# Patient Record
Sex: FEMALE | Race: WHITE | NOT HISPANIC OR LATINO | Employment: UNEMPLOYED | URBAN - METROPOLITAN AREA
[De-identification: names, ages, dates, MRNs, and addresses within clinical notes are randomized per-mention and may not be internally consistent; named-entity substitution may affect disease eponyms.]

---

## 2017-01-25 ENCOUNTER — APPOINTMENT (OUTPATIENT)
Dept: ENDOCRINOLOGY | Facility: MEDICAL CENTER | Age: 57
End: 2017-01-25
Payer: COMMERCIAL

## 2017-02-13 ENCOUNTER — TELEPHONE (OUTPATIENT)
Dept: MEDICAL GROUP | Facility: MEDICAL CENTER | Age: 57
End: 2017-02-13

## 2017-02-14 NOTE — TELEPHONE ENCOUNTER
"Postmenopausal bleeding of any type needs to be followed up on but \"pinkish spotting and tenderness after intercourse,\" could just be irritation of the vaginal vault from dryness or friction.  Will you ask her to schedule an appt with us or her gyn asap if this returns.    "

## 2017-02-14 NOTE — TELEPHONE ENCOUNTER
Patient called today concerned that Saturday 12 hours after having intercourse she had some tenderness and pinkish spotting. As of today she is having no pain or pink spotting.  Patient just checking in to see if this is normal and if she needs to be seen for this. I did let patient know that if she was not having any issues at this time then she may not need an appt.I did let her know that I would seek advise from you.   Please advise

## 2017-02-14 NOTE — TELEPHONE ENCOUNTER
Patient notified.  She does have an appt in March that she will discuss this with Dr Goodman at that time

## 2017-02-22 ENCOUNTER — SLEEP CENTER VISIT (OUTPATIENT)
Dept: SLEEP MEDICINE | Facility: MEDICAL CENTER | Age: 57
End: 2017-02-22
Payer: COMMERCIAL

## 2017-02-22 VITALS
RESPIRATION RATE: 16 BRPM | HEIGHT: 66 IN | SYSTOLIC BLOOD PRESSURE: 132 MMHG | DIASTOLIC BLOOD PRESSURE: 84 MMHG | HEART RATE: 54 BPM | TEMPERATURE: 97.5 F | BODY MASS INDEX: 31.18 KG/M2 | OXYGEN SATURATION: 95 % | WEIGHT: 194 LBS

## 2017-02-22 DIAGNOSIS — G47.30 SLEEP DISORDER BREATHING: ICD-10-CM

## 2017-02-22 DIAGNOSIS — J30.89 SEASONAL ALLERGIC RHINITIS DUE TO OTHER ALLERGIC TRIGGER: ICD-10-CM

## 2017-02-22 PROCEDURE — 99203 OFFICE O/P NEW LOW 30 MIN: CPT | Performed by: NURSE PRACTITIONER

## 2017-02-22 RX ORDER — ZOLPIDEM TARTRATE 5 MG/1
5 TABLET ORAL NIGHTLY PRN
Qty: 3 TAB | Refills: 0 | Status: SHIPPED | OUTPATIENT
Start: 2017-02-22 | End: 2019-01-23

## 2017-02-22 NOTE — MR AVS SNAPSHOT
"        Mable GarciaJuma   2017 3:20 PM   Sleep Center Visit   MRN: [id number]    Department:  [facility]   Dept Phone:  [phone]    Description:  Female : 1960   Provider:  MICHELLE Thurston           Reason for Visit     Establish Care does not sleep well, Dr. Goodman did not tell patient why she was referring to us.      Allergies as of 2017     Allergen Noted Reactions    Codeine 2010   Nausea    Pcn [Penicillins] 2010   Hives      You were diagnosed with     Sleep disorder breathing   [444530]       BMI 31.0-31.9,adult   [160689]       Seasonal allergic rhinitis due to other allergic trigger   [4301713]         Vital Signs     Blood Pressure Pulse Temperature Respirations Height Weight    132/84 mmHg 54 36.4 °C (97.5 °F) 16 1.676 m (5' 6\") 87.998 kg (194 lb)    Body Mass Index Oxygen Saturation Smoking Status             31.33 kg/m2 95% Never Smoker          Basic Information     Date Of Birth Sex Race Ethnicity Preferred Language    1960 Female White Non- English      Your appointments     Mar 15, 2017  9:20 AM   New Patient with Yemi Velez M.D.   [facility]    [id number] [place] [address], [address]  [place] [address] [phone]           Please bring Photo ID, Insurance Cards, All Medication Bottles and copies of any legal documents (such as Living Will, Power of ) If speaking a language besides English please bring an adult . Please arrive 30 minutes prior for check in and registration. You will be receiving a confirmation call a few days before your appointment from our automated call confirmation system.            Mar 21, 2017  3:20 PM   Established Patient with Mable Goodman M.D.   [facility] (--)    [address] [place] [address] [phone]           You will be receiving a confirmation call a few days before your " appointment from our automated call confirmation system.            May 07, 2017  8:00 PM   Sleep Study Diagnostic with SLEEP TECH   [facility] (--)    [address] [place] [address]   [phone]            May 15, 2017  3:20 PM   Follow UP with MICHELLE Thurston   [facility] (--)    [address] [place] [address]   [phone]              Problem List              ICD-10-CM Priority Class Noted - Resolved    History of melanoma Z85.820   10/19/2011 - Present    Menopausal symptoms N95.1   10/19/2011 - Present    Hyperlipemia E78.5   10/19/2011 - Present    Hirsutism L68.0   10/19/2011 - Present    Abnormal thyroid function test R94.6   11/15/2011 - Present    Seasonal allergies J30.2   11/15/2011 - Present    Hyperprolactinemia (CMS-HCC) E22.1   5/7/2012 - Present    Pituitary lesion (CMS-HCC) E23.7   5/7/2012 - Present    Androgen excess, female post puberty E34.8   5/7/2012 - Present    Sleep disorder breathing G47.30   10/22/2012 - Present    Family history of diabetes mellitus Z83.3   10/22/2012 - Present    Elevated fasting blood sugar R73.01   10/22/2012 - Present    Health care maintenance Z00.00   5/24/2016 - Present    Vitamin D deficiency disease E55.9   12/5/2016 - Present    Bilateral tinnitus H93.13   12/5/2016 - Present    Hyperlipidemia E78.5   12/19/2016 - Present    Elevated liver enzymes R74.8   12/19/2016 - Present      Health Maintenance        Date Due Completion Dates    IMM DTaP/Tdap/Td Vaccine (1 - Tdap) 9/10/1978 ---    COLONOSCOPY 9/10/2009 ---    MAMMOGRAM 4/29/2014 4/29/2013 (Done)    Override on 4/29/2013: Done (normal )    PAP SMEAR 8/23/2019 8/23/2016, 10/22/2012, 10/21/2011            Current Immunizations     Influenza TIV (IM) 10/22/2012 11:39 AM    Influenza Vaccine Quad Inj (Pf) 12/5/2016      Below and/or attached are the medications your provider expects you to take. Review all  of your home medications and newly ordered medications with your provider and/or pharmacist. Follow medication instructions as directed by your provider and/or pharmacist. Please keep your medication list with you and share with your provider. Update the information when medications are discontinued, doses are changed, or new medications (including over-the-counter products) are added; and carry medication information at all times in the event of emergency situations     Allergies:  CODEINE - Nausea     PCN - Hives               Medications  Valid as of: February 22, 2017 -  3:45 PM    Generic Name Brand Name Tablet Size Instructions for use    Fluticasone Propionate (Suspension) FLONASE 50 MCG/ACT Spray 1 Spray in nose 2 times a day.        Zolpidem Tartrate (Tab) AMBIEN 5 MG Take 1 Tab by mouth at bedtime as needed for Sleep (1 to 3 po qhs prn insomnia/sleep study. Bring to sleep study.).        .                 Medicines prescribed today were sent to:     [facility] [address] [place] [address] (), [place] - [address] () [place] [address]    Phone: [phone] Fax: [phone]    Open 24 Hours?: No    [facility] - [address] [address]    Phone: [phone] Fax: [phone]    Open 24 Hours?: No      Medication refill instructions:       If your prescription bottle indicates you have medication refills left, it is not necessary to call your provider’s office. Please contact your pharmacy and they will refill your medication.    If your prescription bottle indicates you do not have any refills left, you may request refills at any time through one of the following ways: The online [employer] system (except Urgent Care), by calling your provider’s office, or by asking your pharmacy to contact your provider’s office with a refill request. Medication refills are processed only during regular business hours and may not  be available until the next business day. Your provider may request additional information or to have a follow-up visit with you prior to refilling your medication.   *Please Note: Medication refills are assigned a new Rx number when refilled electronically. Your pharmacy may indicate that no refills were authorized even though a new prescription for the same medication is available at the pharmacy. Please request the medicine by name with the pharmacy before contacting your provider for a refill.           [employer] Access Code: Activation code not generated  Current [employer] Status: Active

## 2017-02-22 NOTE — PROGRESS NOTES
"Chief Complaint   Patient presents with   • Establish Care     does not sleep well, Dr. Goodman did not tell patient why she was referring to us.       HPI:  Mable Jarrell is a 56 y.o. year old female here today to establish care. She was referred by her PCP to evaluate for sleep apnea. She does snore at night. She states she has snored for years. Her snoring has been worse the past 20 years with weight gain. She denies any witnessed apneas or waking gasping for air. Review of her sleep diary indicates that she keeps a set sleep/wake schedule going to bed around 9 am waking at 5:30 am. She tends to fall asleep within minutes. She does wake a couple times during the night to urinate. She does use an OTC sleep aid to help her fall asleep. She states without this she will wake at 1 am and has trouble falling back asleep. She states she has trouble \"shutting her mind down\". She states when she uses the sleep aid and logs enough hours of sleep she will wake refreshed. She is occasionally tired during the day. She notes rare morning headaches, but feels this is sinus related. She did have a home sleep study in the past, but is unsure of whether or not it indicated sleep apnea.   She does have a history of allergic rhinitis and is on a Flonase nasal spray.     Past Medical History   Diagnosis Date   • Pituitary abnormality (CMS-HCC)      2mm cyst or microadenoma   • Hyperprolactinemia (CMS-HCC) 2012     minimal = 32   • Hirsutism 2012     elevated testosterone   • Melanoma (CMS-HCC)    • Tonsillitis    • Chickenpox    • Mumps        Past Surgical History   Procedure Laterality Date   • Cholecystectomy     • Tonsillectomy         Family History   Problem Relation Age of Onset   • Diabetes Mother    • Diabetes Father    • Heart Disease Father    • Cancer Neg Hx    • Hypertension Neg Hx    • Hyperlipidemia Neg Hx    • Stroke Neg Hx    • Psychiatry Neg Hx    • Thyroid Neg Hx    • No Known Problems Sister    • No Known " "Problems Brother    • No Known Problems Daughter        Social History     Social History   • Marital Status:      Spouse Name: N/A   • Number of Children: N/A   • Years of Education: N/A     Occupational History   • Not on file.     Social History Main Topics   • Smoking status: Never Smoker    • Smokeless tobacco: Never Used   • Alcohol Use: 0.0 oz/week     0 Standard drinks or equivalent per week      Comment: 3-4 on the weekends   • Drug Use: No   • Sexual Activity:     Partners: Male     Other Topics Concern   • Not on file     Social History Narrative       ROS:  Constitutional: Denies fevers, chills, sweats, weight loss  Eyes: Denies glasses, vision loss, pain, drainage, double vision  Ears/Nose/Mouth/Throat: Denies ear ache, difficulty hearing, sore throat, persistent hoarseness, decayed teeth/toothache  Cardiovascular: Denies chest pain, tightness, palpitations, swelling in feet/legs, fainting, difficulty breathing when laying down  Respiratory: Denies shortness of breath, cough, sputum, wheezing, painful breathing, coughing up blood  GI: Denies heartburn, difficulty swallowing, nausea, vomiting, abdominal pain, diarrhea, constipation  : Denies frequent urination, painful urination  Integumentary: Denies rashes, lumps or color changes  MSK: Denies painful joints, sore muscles, and back pain.   Neurological: Denies frequent headaches, dizziness, weakness  Sleep: See HPI       Current Outpatient Prescriptions on File Prior to Visit   Medication Sig Dispense Refill   • fluticasone (FLONASE) 50 MCG/ACT nasal spray Spray 1 Spray in nose 2 times a day. 16 g 0     No current facility-administered medications on file prior to visit.     Codeine and Pcn    Blood pressure 132/84, pulse 54, temperature 36.4 °C (97.5 °F), resp. rate 16, height 1.676 m (5' 6\"), weight 87.998 kg (194 lb), SpO2 95 %.  PE:   Appearance: Well developed, well nourished, no acute distress  Eyes: PERRL, EOM intact, sclera white, " conjunctiva moist  Ears: no lesions or deformities  Hearing: grossly intact  Nose: no lesions or deformities  Oropharynx: tongue normal, posterior pharynx without erythema or exudate  Mallampati Classification: class 4   Neck: supple, trachea midline, no masses   Respiratory effort: no intercostal retractions or use of accessory muscles  Lung auscultation: no rales, rhonchi or wheezes  Heart auscultation: no murmur rub or gallop  Extremities: no cyanosis or edema  Abdomen: soft ,non tender, no masses  Gait and Station: normal  Digits and nails: no clubbing, cyanosis, petechiae or nodes.  Cranial nerves: grossly intact  Skin: no rashes, lesions or ulcers noted  Orientation: Oriented to time, person and place  Mood and affect: mood and affect appropriate, normal interaction with examiner  Judgement: Intact          Assessment:  1. Sleep disorder breathing     2. BMI 31.0-31.9,adult     3. Seasonal allergic rhinitis due to other allergic trigger           Plan:    1) Polysomnogram now to evaluate for sleep apnea. Discussed pathophysiology of sleep apnea in detail and various treatment options. RX for Ambien provided to have on hand the night of the study.  2) Sleep hygiene discussed. Weight loss recommended.  3) Continue Flonase nasal spray.   4) Follow up after Polysomnogram, sooner if needed.

## 2017-02-22 NOTE — PATIENT INSTRUCTIONS
Plan:    1) Polysomnogram now to evaluate for sleep apnea. Discussed pathophysiology of sleep apnea in detail and various treatment options. RX for Ambien provided to have on hand the night of the study.  2) Sleep hygiene discussed. Weight loss recommended.  3) Continue Flonase nasal spray.   4) Follow up after Polysomnogram, sooner if needed.

## 2017-03-15 ENCOUNTER — OFFICE VISIT (OUTPATIENT)
Dept: ENDOCRINOLOGY | Facility: MEDICAL CENTER | Age: 57
End: 2017-03-15
Payer: COMMERCIAL

## 2017-03-15 VITALS
SYSTOLIC BLOOD PRESSURE: 132 MMHG | DIASTOLIC BLOOD PRESSURE: 80 MMHG | WEIGHT: 194 LBS | HEIGHT: 64 IN | BODY MASS INDEX: 33.12 KG/M2 | HEART RATE: 61 BPM | OXYGEN SATURATION: 94 %

## 2017-03-15 DIAGNOSIS — E22.1 HYPERPROLACTINEMIA (HCC): ICD-10-CM

## 2017-03-15 DIAGNOSIS — E23.7 PITUITARY LESION (HCC): ICD-10-CM

## 2017-03-15 DIAGNOSIS — E28.1 ANDROGEN EXCESS, FEMALE POST PUBERTY: Primary | ICD-10-CM

## 2017-03-15 PROCEDURE — 99214 OFFICE O/P EST MOD 30 MIN: CPT | Performed by: INTERNAL MEDICINE

## 2017-03-15 NOTE — PROGRESS NOTES
"Chief Complaint   Patient presents with   • Pituitary Adenoma     hyperprolactinemia        HPI:    1.  Hyperprolactinemia          The patient has had a mild elevation of prolactin in the past of unknown significance. She does have a tiny pituitary lesion but unclear whether they're related. She went through menopause roughly around the age of 44. Her menses were normal up to that point. No infertility.         Her current prolactin is up a little bit to 42 and previously 4 years ago was 28.    2.  Pituitary adenoma ?             Pituitary MRI in 2012 showed a \"subtle\" 2 mm lesion in the right side of the pituitary considered not specific. She was not treated with cabergoline or any hormonal supplements in the interval.    3.  Elevated testosterone            We got into this because of scalp hair loss. She has had a mild elevation of her testosterone. She has not been hirsute otherwise. She did not have acne. She does not take any hormonal supplements topical or otherwise.             Her current total testosterone is up to 136 and previously it had been   (7 -40) in 2012    Nothing has changed in the interval. She is in to reexamine these particular abnormalities    ROS:  All other systems reported as negative or unchanged since last exam      Allergies:   Allergies   Allergen Reactions   • Codeine Nausea   • Pcn [Penicillins] Hives       Current medicines including changes today:  Current Outpatient Prescriptions   Medication Sig Dispense Refill   • zolpidem (AMBIEN) 5 MG Tab Take 1 Tab by mouth at bedtime as needed for Sleep (1 to 3 po qhs prn insomnia/sleep study. Bring to sleep study.). 3 Tab 0   • fluticasone (FLONASE) 50 MCG/ACT nasal spray Spray 1 Spray in nose 2 times a day. 16 g 0     No current facility-administered medications for this visit.        Past Medical History   Diagnosis Date   • Pituitary abnormality (CMS-HCC)      2mm cyst or microadenoma   • Hyperprolactinemia (CMS-HCC) 2012     " "minimal = 32   • Hirsutism 2012     elevated testosterone   • Melanoma (CMS-HCC)    • Tonsillitis    • Chickenpox    • Mumps        PHYSICAL EXAM:    /80 mmHg  Pulse 61  Ht 1.626 m (5' 4\")  Wt 87.998 kg (194 lb)  BMI 33.28 kg/m2  SpO2 94%    Gen.   appears healthy     Skin   appropriate for sex and age, patient is not hirsute. No acne    HEENT  visual fields are full to gross confrontation    Neck   thyroid gland is small normal    Heart  regular    Extremities  no edema    Neuro  gait and station normal    Psych  appropriate, calm, pleasant    ASSESSMENT AND RECOMMENDATIONS    1. Hyperprolactinemia (CMS-HCC)             Apparently progressive up to 42  - PROLACTIN; Future      2. Pituitary lesion (CMS-HCC)              2 mm lesion in 2012 of unknown significance  - PROLACTIN; Future  - MR-BRAIN PITUITARY W/WO; Future    3. 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 No obvious etiology             Would like to avoid the radiation associated with CT so we will screen with ultrasound of adrenals and ovaries   - DHEA; Future  - CORTISOL; Future  - ACTH; Future  - FSH; Future  - ESTRADIOL; Future  - BIOAVAILABLE+FREE TESTOSTERONE  - US-ABDOMEN LIMITED; Future  - US-OB PELVIS TRANSVAGINAL; Future      DISPOSITION: Return in about 6 months (around 9/15/2017).       Yemi Velez M.D.    Copies to: Mable Goodman M.D. [phone]  "

## 2017-03-15 NOTE — MR AVS SNAPSHOT
"        Mable Jarrell   3/15/2017 9:20 AM   Office Visit   MRN: [id number]    Department:  Endocrinology [facility]   Dept Phone:  [phone]    Description:  Female : 1960   Provider:  Yemi Velez M.D.           Allergies as of 3/15/2017     Allergen Noted Reactions    Codeine 2010   Nausea    Pcn [Penicillins] 2010   Hives      You were diagnosed with     Pituitary lesion (CMS-[facility])   [603810]  -  Primary     Hyperprolactinemia (CMS-[facility])   [910886]       Androgen excess, female post puberty   [209750]         Vital Signs     Blood Pressure Pulse Height Weight Body Mass Index Oxygen Saturation    132/80 mmHg 61 1.626 m (5' 4\") 87.998 kg (194 lb) 33.28 kg/m2 94%    Smoking Status                   Never Smoker            Basic Information     Date Of Birth Sex Race Ethnicity Preferred Language    1960 Female White Non- English      Your appointments     Mar 21, 2017  3:20 PM   Established Patient with Mable Goodman M.D.   [facility] (--)    [address]  [place] [address] [phone]           You will be receiving a confirmation call a few days before your appointment from our automated call confirmation system.            Mar 31, 2017  7:05 PM   Sleep Study Diagnostic with SLEEP TECH   [facility] (--)    [address]  [place] [address] [phone]            2017  3:00 PM   Follow UP with MICHELLE Thurston   [facility] (--)    [address]  [place] [address] [phone]            Sep 20, 2017  3:40 PM   Established Patient with Yemi Velez M.D.   [facility]    [address]  [place] [address] [phone]           You will be receiving a confirmation call a few days before your appointment from our automated call confirmation system.              "   Problem List              ICD-10-CM Priority Class Noted - Resolved    History of melanoma Z85.820   10/19/2011 - Present    Menopausal symptoms N95.1   10/19/2011 - Present    Hyperlipemia E78.5   10/19/2011 - Present    Hirsutism L68.0   10/19/2011 - Present    Abnormal thyroid function test R94.6   11/15/2011 - Present    Seasonal allergies J30.2   11/15/2011 - Present    Hyperprolactinemia (CMS-HCC) E22.1   5/7/2012 - Present    Pituitary lesion (CMS-HCC) E23.7   5/7/2012 - Present    Androgen excess, female post puberty E34.8   5/7/2012 - Present    Sleep disorder breathing G47.30   10/22/2012 - Present    Family history of diabetes mellitus Z83.3   10/22/2012 - Present    Elevated fasting blood sugar R73.01   10/22/2012 - Present    Health care maintenance Z00.00   5/24/2016 - Present    Vitamin D deficiency disease E55.9   12/5/2016 - Present    Bilateral tinnitus H93.13   12/5/2016 - Present    Hyperlipidemia E78.5   12/19/2016 - Present    Elevated liver enzymes R74.8   12/19/2016 - Present      Health Maintenance        Date Due Completion Dates    IMM DTaP/Tdap/Td Vaccine (1 - Tdap) 9/10/1978 ---    COLONOSCOPY 9/10/2009 ---    MAMMOGRAM 4/29/2014 4/29/2013 (Done)    Override on 4/29/2013: Done (normal )    PAP SMEAR 8/23/2019 8/23/2016, 10/22/2012, 10/21/2011            Current Immunizations     Influenza TIV (IM) 10/22/2012 11:39 AM    Influenza Vaccine Quad Inj (Pf) 12/5/2016      Below and/or attached are the medications your provider expects you to take. Review all of your home medications and newly ordered medications with your provider and/or pharmacist. Follow medication instructions as directed by your provider and/or pharmacist. Please keep your medication list with you and share with your provider. Update the information when medications are discontinued, doses are changed, or new medications (including over-the-counter products) are added; and carry medication information at all times in the  event of emergency situations     Allergies:  CODEINE - Nausea     PCN - Hives               Medications  Valid as of: March 15, 2017 -  9:47 AM    Generic Name Brand Name Tablet Size Instructions for use    Fluticasone Propionate (Suspension) FLONASE 50 MCG/ACT Spray 1 Spray in nose 2 times a day.        Zolpidem Tartrate (Tab) AMBIEN 5 MG Take 1 Tab by mouth at bedtime as needed for Sleep (1 to 3 po qhs prn insomnia/sleep study. Bring to sleep study.).        .                 Medicines prescribed today were sent to:     [facility] [place] (), [place] - [address]    [address] () [place] [address]    Phone: [phone] Fax: [phone]    Open 24 Hours?: No    [facility] - [address] [address]    Phone: [phone] Fax: [phone]    Open 24 Hours?: No      Medication refill instructions:       If your prescription bottle indicates you have medication refills left, it is not necessary to call your provider’s office. Please contact your pharmacy and they will refill your medication.    If your prescription bottle indicates you do not have any refills left, you may request refills at any time through one of the following ways: The online [employer] system (except Urgent Care), by calling your provider’s office, or by asking your pharmacy to contact your provider’s office with a refill request. Medication refills are processed only during regular business hours and may not be available until the next business day. Your provider may request additional information or to have a follow-up visit with you prior to refilling your medication.   *Please Note: Medication refills are assigned a new Rx number when refilled electronically. Your pharmacy may indicate that no refills were authorized even though a new prescription for the same medication is available at the pharmacy. Please request the medicine by name with  the pharmacy before contacting your provider for a refill.        Your To Do List     Future Labs/Procedures Complete By Expires    ACTH  As directed 9/15/2017    CORTISOL  As directed 9/15/2017    DHEA  As directed 9/15/2017    ESTRADIOL  As directed 9/15/2017    FSH  As directed 9/15/2017    PROLACTIN  As directed 9/15/2017         MyChart Access Code: Activation code not generated  Current [employer] Status: Active

## 2017-03-31 ENCOUNTER — SLEEP STUDY (OUTPATIENT)
Dept: SLEEP MEDICINE | Facility: MEDICAL CENTER | Age: 57
End: 2017-03-31
Attending: NURSE PRACTITIONER
Payer: COMMERCIAL

## 2017-03-31 DIAGNOSIS — E28.1 ANDROGEN EXCESS, FEMALE POST PUBERTY: ICD-10-CM

## 2017-03-31 PROCEDURE — 95811 POLYSOM 6/>YRS CPAP 4/> PARM: CPT | Performed by: INTERNAL MEDICINE

## 2017-04-03 ENCOUNTER — HOSPITAL ENCOUNTER (OUTPATIENT)
Dept: RADIOLOGY | Facility: MEDICAL CENTER | Age: 57
End: 2017-04-03
Attending: INTERNAL MEDICINE
Payer: COMMERCIAL

## 2017-04-03 DIAGNOSIS — E28.1 ANDROGEN EXCESS, FEMALE POST PUBERTY: ICD-10-CM

## 2017-04-03 PROCEDURE — 76830 TRANSVAGINAL US NON-OB: CPT

## 2017-04-03 PROCEDURE — 76775 US EXAM ABDO BACK WALL LIM: CPT

## 2017-04-03 NOTE — PROCEDURES
CLINICAL COMMENTS:  The patient underwent a split night polysomnogram with a CPAP titration using the standard montage for measurement of parameters of sleep, respiratory events, movement abnormalities, heart rate and rhythm. A microphone was used to monitor snoring.    INTERPRETATION: The diagnostic recording time was 137.8 minutes with a sleep period of 132.3 minutes.  Total sleep time was 129.3 minutes with a sleep efficiency of 93.9%.  The sleep latency was 5.5 minutes, and REM latency was N/A minutes.  The patient had 12 arousals in total, for an arousal index of 5.6.        RESPIRATORY: The patient had 1 apneas in total.  Of these, 0 were obstructive apneas, and 1 were central apneas.  This resulted in an apnea index (AI) of 0.5.  The patient had 57 hypopneas in total, which resulted in a hypopnea index of 26.5.  The overall AHI was 26.9, while the AHI during REM was N/A.  The supine AHI = 26.9.    OXIMETRY: Oxygen saturation monitoring showed a mean SpO2 of 88.9% for the diagnostic part of the study, with a minimum oxygen saturation of 75.0%.  Oxygen saturations were below 89% for 51.2% of sleep time.    CARDIAC: The highest heart rate for the first part of the study was 88.0 beats per minute.  The average heart rate during sleep was 60.6 bpm, while the highest heart rate was 84.0 bpm.    LIMB MOVEMENTS: There were a total of 0 periodic limb movements during sleep, of which 0 were PLMS arousals.  This resulted in a PLMS index of 0.0 and a PLMS arousal index of 0.0.    TREATMENT    Treatment recording time was 235.8 minutes with a total sleep time of 176.5min.  The patient had an arousal index of 7.5.      RESPIRATORY: The patient had 0 obstructive apneas, 23 central apneas, and 62 hypopneas for an overall AHI was 28.9.    OXIMETRY: The mean SpO2 during treatment was 89.3%, with a minimum oxygen saturation of 69.0%.      CPAP was tried from 4cm to [id number].    Impression:    Severe obstructive sleep apnea  hypopnea was found. The apnea hypopnea index was 26.9, the mean event duration 18 seconds, and the longest event was 30.9 seconds. The patient did not achieve any REM and slept entirely in the supine position during the diagnostic phase. The lowest saturation during sleep was 75.0%. Saturations were less than 90% for 81% of the recording.    Once the patient met the split-night protocol, the technician performed a positive airway pressure titration. CPAP was started at 4 cm and increased to a maximum pressure of CPAP at 10 cm in an attempt to normalize events and desaturations. The patient did best on CPAP at 8 cm where she was able to achieve supine REM sleep, had a resultant AHI of 9.2, a minimum saturation of 86%, and a mean saturation of 88.4%. She did not do nearly as well on higher and lower CPAP pressures.  Central apneas increased to 27.1% of the total number of respiratory events up from 1.7% during the diagnostic study. This is not enough to qualify her for ASV.    Recommendation:    Consider an empiric trial of auto titrating CPAP 8-16 cm. Alternatively, the patient can return for a full night dedicated bilevel titration.

## 2017-04-04 ENCOUNTER — TELEPHONE (OUTPATIENT)
Dept: ENDOCRINOLOGY | Facility: MEDICAL CENTER | Age: 57
End: 2017-04-04

## 2017-04-04 NOTE — TELEPHONE ENCOUNTER
Left message to call me back.         Result Note      No adrenal tumor seen on this study. Still a mystery. We should continue to follow.          Yemi Velez M.D.

## 2017-04-04 NOTE — TELEPHONE ENCOUNTER
----- Message from Yemi Velez M.D. sent at 4/3/2017 10:38 PM PDT -----  Ovaries look good also.    Yemi Velez M.D.

## 2017-04-25 ENCOUNTER — SLEEP CENTER VISIT (OUTPATIENT)
Dept: SLEEP MEDICINE | Facility: MEDICAL CENTER | Age: 57
End: 2017-04-25
Payer: COMMERCIAL

## 2017-04-25 VITALS
HEART RATE: 66 BPM | RESPIRATION RATE: 16 BRPM | DIASTOLIC BLOOD PRESSURE: 86 MMHG | HEIGHT: 66 IN | TEMPERATURE: 98.8 F | SYSTOLIC BLOOD PRESSURE: 132 MMHG | BODY MASS INDEX: 31.18 KG/M2 | WEIGHT: 194 LBS | OXYGEN SATURATION: 94 %

## 2017-04-25 DIAGNOSIS — G47.33 OSA (OBSTRUCTIVE SLEEP APNEA): ICD-10-CM

## 2017-04-25 DIAGNOSIS — J30.89 SEASONAL ALLERGIC RHINITIS DUE TO OTHER ALLERGIC TRIGGER: ICD-10-CM

## 2017-04-25 PROCEDURE — 99213 OFFICE O/P EST LOW 20 MIN: CPT | Performed by: NURSE PRACTITIONER

## 2017-04-25 NOTE — PATIENT INSTRUCTIONS
Sleep Apnea   Sleep apnea is a sleep disorder characterized by abnormal pauses in breathing while you sleep. When your breathing pauses, the level of oxygen in your blood decreases. This causes you to move out of deep sleep and into light sleep. As a result, your quality of sleep is poor, and the system that carries your blood throughout your body (cardiovascular system) experiences stress. If sleep apnea remains untreated, the following conditions can develop:  · High blood pressure (hypertension).  · Coronary artery disease.  · Inability to achieve or maintain an erection (impotence).  · Impairment of your thought process (cognitive dysfunction).  There are three types of sleep apnea:  1. Obstructive sleep apnea--Pauses in breathing during sleep because of a blocked airway.  2. Central sleep apnea--Pauses in breathing during sleep because the area of the brain that controls your breathing does not send the correct signals to the muscles that control breathing.  3. Mixed sleep apnea--A combination of both obstructive and central sleep apnea.  RISK FACTORS  The following risk factors can increase your risk of developing sleep apnea:  · Being overweight.  · Smoking.  · Having narrow passages in your nose and throat.  · Being of older age.  · Being male.  · Alcohol use.  · Sedative and tranquilizer use.  · Ethnicity. Among individuals younger than 35 years,  Americans are at increased risk of sleep apnea.  SYMPTOMS   · Difficulty staying asleep.  · Daytime sleepiness and fatigue.  · Loss of energy.  · Irritability.  · Loud, heavy snoring.  · Morning headaches.  · Trouble concentrating.  · Forgetfulness.  · Decreased interest in sex.  · Unexplained sleepiness.  DIAGNOSIS   In order to diagnose sleep apnea, your caregiver will perform a physical examination. A sleep study done in the comfort of your own home may be appropriate if you are otherwise healthy. Your caregiver may also recommend that you spend the  "night in a sleep lab. In the sleep lab, several monitors record information about your heart, lungs, and brain while you sleep. Your leg and arm movements and blood oxygen level are also recorded.  TREATMENT  The following actions may help to resolve mild sleep apnea:  · Sleeping on your side.    · Using a decongestant if you have nasal congestion.    · Avoiding the use of depressants, including alcohol, sedatives, and narcotics.    · Losing weight and modifying your diet if you are overweight.  There also are devices and treatments to help open your airway:  · Oral appliances. These are custom-made mouthpieces that shift your lower jaw forward and slightly open your bite. This opens your airway.  · Devices that create positive airway pressure. This positive pressure \"splints\" your airway open to help you breathe better during sleep. The following devices create positive airway pressure:  ¨ Continuous positive airway pressure (CPAP) device. The CPAP device creates a continuous level of air pressure with an air pump. The air is delivered to your airway through a mask while you sleep. This continuous pressure keeps your airway open.  ¨ Nasal expiratory positive airway pressure (EPAP) device. The EPAP device creates positive air pressure as you exhale. The device consists of single-use valves, which are inserted into each nostril and held in place by adhesive. The valves create very little resistance when you inhale but create much more resistance when you exhale. That increased resistance creates the positive airway pressure. This positive pressure while you exhale keeps your airway open, making it easier to breath when you inhale again.  ¨ Bilevel positive airway pressure (BPAP) device. The BPAP device is used mainly in patients with central sleep apnea. This device is similar to the CPAP device because it also uses an air pump to deliver continuous air pressure through a mask. However, with the BPAP machine, the " pressure is set at two different levels. The pressure when you exhale is lower than the pressure when you inhale.  · Surgery. Typically, surgery is only done if you cannot comply with less invasive treatments or if the less invasive treatments do not improve your condition. Surgery involves removing excess tissue in your airway to create a wider passage way.     This information is not intended to replace advice given to you by your health care provider. Make sure you discuss any questions you have with your health care provider.     Document Released: 12/08/2003 Document Revised: 01/08/2016 Document Reviewed: 04/25/2013  [employer] Patient Education ©2016 [employer]

## 2017-04-25 NOTE — PROGRESS NOTES
"Chief Complaint   Patient presents with   • Apnea     Sleep Study results       HPI:  Mable Jarrell is a 56 y.o. year old female here today for follow-up on her Polysomnogram.  She was initially referred by her PCP to evaluate for sleep apnea. She does snore at night. She states she has snored for years. Her snoring has been worse the past 20 years with weight gain. She denies any witnessed apneas or waking gasping for air. Review of her sleep diary indicates that she keeps a set sleep/wake schedule going to bed around 9 am waking at 5:30 am. She tends to fall asleep within minutes. She does wake a couple times during the night to urinate. She does use an OTC sleep aid to help her fall asleep. She states without this she will wake at 1 am and has trouble falling back asleep. She states she has trouble \"shutting her mind down\". She states when she uses the sleep aid and logs enough hours of sleep she will wake refreshed. She is occasionally tired during the day. She notes rare morning headaches, but feels this is sinus related. She did have a home sleep study in the past, but is unsure of whether or not it indicated sleep apnea.    She does have a history of allergic rhinitis and is on a Flonase nasal spray.   Polysomnogram performed on 3/31/2017 indicates evidence of moderately severe obstructive sleep apnea with an AHI of 26.9 with a low 02 saturation of 75% and 80.2% of the night spent with saturations less than 90%. She had a split night titration to CPAP pressures of 4-10 CM. She had a positive but incomplete response to CPAP. On a pressure of 8 CM her AHI was reduced to 9.2. However, her mean 02 saturation was only 88.4%.     Past Medical History   Diagnosis Date   • Pituitary abnormality (CMS-HCC)      2mm cyst or microadenoma   • Hyperprolactinemia (CMS-[facility]) 2012     minimal = 32   • Hirsutism 2012     elevated testosterone   • Melanoma (CMS-[facility])    • Tonsillitis    • Chickenpox    • Mumps        Past " Surgical History   Procedure Laterality Date   • Cholecystectomy     • Tonsillectomy         Family History   Problem Relation Age of Onset   • Diabetes Mother    • Diabetes Father    • Heart Disease Father    • Cancer Neg Hx    • Hypertension Neg Hx    • Hyperlipidemia Neg Hx    • Stroke Neg Hx    • Psychiatry Neg Hx    • Thyroid Neg Hx    • No Known Problems Sister    • No Known Problems Brother    • No Known Problems Daughter        Social History     Social History   • Marital Status:      Spouse Name: N/A   • Number of Children: N/A   • Years of Education: N/A     Occupational History   • Not on file.     Social History Main Topics   • Smoking status: Never Smoker    • Smokeless tobacco: Never Used   • Alcohol Use: 0.0 oz/week     0 Standard drinks or equivalent per week      Comment: 3-4 on the weekends   • Drug Use: No   • Sexual Activity:     Partners: Male     Other Topics Concern   • Not on file     Social History Narrative         ROS:  Constitutional: Denies fevers, chills, sweats, weight loss  Eyes: Denies glasses, vision loss, pain, drainage, double vision  Ears/Nose/Mouth/Throat: Denies ear ache, difficulty hearing, sore throat, persistent hoarseness, decayed teeth/toothache  Cardiovascular: Denies chest pain, tightness, palpitations, swelling in feet/legs, fainting, difficulty breathing when laying down  Respiratory: Denies shortness of breath, cough, sputum, wheezing, painful breathing, coughing up blood  GI: Denies heartburn, difficulty swallowing, nausea, vomiting, abdominal pain, diarrhea, constipation  : Denies frequent urination, painful urination  Integumentary: Denies rashes, lumps or color changes  MSK: Denies painful joints, sore muscles, and back pain.   Neurological: Denies frequent headaches, dizziness, weakness  Sleep: See HPI       Current Outpatient Prescriptions on File Prior to Visit   Medication Sig Dispense Refill   • zolpidem (AMBIEN) 5 MG Tab Take 1 Tab by mouth at  "bedtime as needed for Sleep (1 to 3 po qhs prn insomnia/sleep study. Bring to sleep study.). 3 Tab 0   • fluticasone (FLONASE) 50 MCG/ACT nasal spray Spray 1 Spray in nose 2 times a day. 16 g 0     No current facility-administered medications on file prior to visit.     Codeine and Pcn    Blood pressure 132/86, pulse 66, temperature 37.1 °C (98.8 °F), resp. rate 16, height 1.676 m (5' 6\"), weight 87.998 kg (194 lb), SpO2 94 %.  PE:   Appearance: Well developed, well nourished, no acute distress  Eyes: PERRL, EOM intact, sclera white, conjunctiva moist  Ears: no lesions or deformities  Hearing: grossly intact  Nose: no lesions or deformities  Oropharynx: tongue normal, posterior pharynx without erythema or exudate  Mallampati Classification: class 4  Neck: supple, trachea midline, no masses   Respiratory effort: no intercostal retractions or use of accessory muscles  Lung auscultation: no rales, rhonchi or wheezes  Heart auscultation: no murmur rub or gallop  Extremities: no cyanosis or edema  Abdomen: soft ,non tender, no masses  Gait and Station: normal  Digits and nails: no clubbing, cyanosis, petechiae or nodes.  Cranial nerves: grossly intact  Skin: no rashes, lesions or ulcers noted  Orientation: Oriented to time, person and place  Mood and affect: mood and affect appropriate, normal interaction with examiner  Judgement: Intact          Assessment:  1. MANISH (obstructive sleep apnea)     2. Body mass index (bmi) 31.0-31.9, adult     3. Seasonal allergic rhinitis due to other allergic trigger           Plan:      1) Reviewed sleep study in detail. Discussed pathophysiology of sleep apnea and various treatment options in detail. She is amendable to a trial of airway pressurization. Order for Auto CPAP at 10-16 CM H20. CNOX in 1 month on this setting to ensure adequate 02 saturations. Handout provided on sleep apnea.  2) Sleep hygiene discussed.   3) Weight loss recommended.   4) Follow up in 6 weeks with compliance " card download, sooner if needed.

## 2017-04-25 NOTE — MR AVS SNAPSHOT
"        Mable Jarrell   2017 3:00 PM   Sleep Center Visit   MRN: [id number]    Department:  [facility]   Dept Phone:  [phone]    Description:  Female : 1960   Provider:  MICHELLE Thurston           Reason for Visit     Apnea Sleep Study results      Allergies as of 2017     Allergen Noted Reactions    Codeine 2010   Nausea    Pcn [Penicillins] 2010   Hives      You were diagnosed with     MANISH (obstructive sleep apnea)   [689081]       Body mass index (bmi) 31.0-31.9, adult   [3222307]       Seasonal allergic rhinitis due to other allergic trigger   [9958036]         Vital Signs     Blood Pressure Pulse Temperature Respirations Height Weight    132/86 mmHg 66 37.1 °C (98.8 °F) 16 1.676 m (5' 6\") 87.998 kg (194 lb)    Body Mass Index Oxygen Saturation Smoking Status             31.33 kg/m2 94% Never Smoker          Basic Information     Date Of Birth Sex Race Ethnicity Preferred Language    1960 Female White Non- English      Your appointments     May 16, 2017  4:00 PM   Established Patient with Mable Goodman M.D.   [facility] (--)    [address]  [place] [address] [phone]           You will be receiving a confirmation call a few days before your appointment from our automated call confirmation system.            2017  3:00 PM   Follow UP with LARUEN ThurstonPRAO   [facility] (--)    [address]  [place] [address] [phone]            Sep 20, 2017  3:40 PM   Established Patient with Yemi Velez M.D.   [facility]    [address]  [place] [address] [phone]           You will be receiving a confirmation call a few days before your appointment from our automated call confirmation system.              Problem List              ICD-10-CM Priority Class Noted - " Resolved    History of melanoma Z85.820   10/19/2011 - Present    Menopausal symptoms N95.1   10/19/2011 - Present    Hyperlipemia E78.5   10/19/2011 - Present    Hirsutism L68.0   10/19/2011 - Present    Abnormal thyroid function test R94.6   11/15/2011 - Present    Seasonal allergies J30.2   11/15/2011 - Present    Hyperprolactinemia (CMS-HCC) E22.1   5/7/2012 - Present    Pituitary lesion (CMS-HCC) E23.7   5/7/2012 - Present    Androgen excess, female post puberty E34.8   5/7/2012 - Present    MANISH (obstructive sleep apnea) G47.33   10/22/2012 - Present    Family history of diabetes mellitus Z83.3   10/22/2012 - Present    Elevated fasting blood sugar R73.01   10/22/2012 - Present    Health care maintenance Z00.00   5/24/2016 - Present    Vitamin D deficiency disease E55.9   12/5/2016 - Present    Bilateral tinnitus H93.13   12/5/2016 - Present    Hyperlipidemia E78.5   12/19/2016 - Present    Elevated liver enzymes R74.8   12/19/2016 - Present      Health Maintenance        Date Due Completion Dates    IMM DTaP/Tdap/Td Vaccine (1 - Tdap) 9/10/1978 ---    COLONOSCOPY 9/10/2009 ---    MAMMOGRAM 4/29/2014 4/29/2013 (Done)    Override on 4/29/2013: Done (normal )    PAP SMEAR 8/23/2019 8/23/2016, 10/22/2012, 10/21/2011            Current Immunizations     Influenza TIV (IM) 10/22/2012 11:39 AM    Influenza Vaccine Quad Inj (Pf) 12/5/2016      Below and/or attached are the medications your provider expects you to take. Review all of your home medications and newly ordered medications with your provider and/or pharmacist. Follow medication instructions as directed by your provider and/or pharmacist. Please keep your medication list with you and share with your provider. Update the information when medications are discontinued, doses are changed, or new medications (including over-the-counter products) are added; and carry medication information at all times in the event of emergency situations     Allergies:  CODEINE -  Nausea     PCN - Hives               Medications  Valid as of: April 25, 2017 -  3:52 PM    Generic Name Brand Name Tablet Size Instructions for use    Fluticasone Propionate (Suspension) FLONASE 50 MCG/ACT Spray 1 Spray in nose 2 times a day.        Zolpidem Tartrate (Tab) AMBIEN 5 MG Take 1 Tab by mouth at bedtime as needed for Sleep (1 to 3 po qhs prn insomnia/sleep study. Bring to sleep study.).        .                 Medicines prescribed today were sent to:     [address] (), [place] - [address]    [address] () [place] [address]    Phone: [phone] Fax: [phone]    Open 24 Hours?: No    [facility] - [address] [address]    Phone: [phone] Fax: [phone]    Open 24 Hours?: No      Medication refill instructions:       If your prescription bottle indicates you have medication refills left, it is not necessary to call your provider’s office. Please contact your pharmacy and they will refill your medication.    If your prescription bottle indicates you do not have any refills left, you may request refills at any time through one of the following ways: The online [employer] system (except Urgent Care), by calling your provider’s office, or by asking your pharmacy to contact your provider’s office with a refill request. Medication refills are processed only during regular business hours and may not be available until the next business day. Your provider may request additional information or to have a follow-up visit with you prior to refilling your medication.   *Please Note: Medication refills are assigned a new Rx number when refilled electronically. Your pharmacy may indicate that no refills were authorized even though a new prescription for the same medication is available at the pharmacy. Please request the medicine by name with the pharmacy before contacting your provider for a  refill.        Instructions    Sleep Apnea   Sleep apnea is a sleep disorder characterized by abnormal pauses in breathing while you sleep. When your breathing pauses, the level of oxygen in your blood decreases. This causes you to move out of deep sleep and into light sleep. As a result, your quality of sleep is poor, and the system that carries your blood throughout your body (cardiovascular system) experiences stress. If sleep apnea remains untreated, the following conditions can develop:  · High blood pressure (hypertension).  · Coronary artery disease.  · Inability to achieve or maintain an erection (impotence).  · Impairment of your thought process (cognitive dysfunction).  There are three types of sleep apnea:  1. Obstructive sleep apnea--Pauses in breathing during sleep because of a blocked airway.  2. Central sleep apnea--Pauses in breathing during sleep because the area of the brain that controls your breathing does not send the correct signals to the muscles that control breathing.  3. Mixed sleep apnea--A combination of both obstructive and central sleep apnea.  RISK FACTORS  The following risk factors can increase your risk of developing sleep apnea:  · Being overweight.  · Smoking.  · Having narrow passages in your nose and throat.  · Being of older age.  · Being male.  · Alcohol use.  · Sedative and tranquilizer use.  · Ethnicity. Among individuals younger than 35 years,  Americans are at increased risk of sleep apnea.  SYMPTOMS   · Difficulty staying asleep.  · Daytime sleepiness and fatigue.  · Loss of energy.  · Irritability.  · Loud, heavy snoring.  · Morning headaches.  · Trouble concentrating.  · Forgetfulness.  · Decreased interest in sex.  · Unexplained sleepiness.  DIAGNOSIS   In order to diagnose sleep apnea, your caregiver will perform a physical examination. A sleep study done in the comfort of your own home may be appropriate if you are otherwise healthy. Your caregiver may also  "recommend that you spend the night in a sleep lab. In the sleep lab, several monitors record information about your heart, lungs, and brain while you sleep. Your leg and arm movements and blood oxygen level are also recorded.  TREATMENT  The following actions may help to resolve mild sleep apnea:  · Sleeping on your side.    · Using a decongestant if you have nasal congestion.    · Avoiding the use of depressants, including alcohol, sedatives, and narcotics.    · Losing weight and modifying your diet if you are overweight.  There also are devices and treatments to help open your airway:  · Oral appliances. These are custom-made mouthpieces that shift your lower jaw forward and slightly open your bite. This opens your airway.  · Devices that create positive airway pressure. This positive pressure \"splints\" your airway open to help you breathe better during sleep. The following devices create positive airway pressure:  ¨ Continuous positive airway pressure (CPAP) device. The CPAP device creates a continuous level of air pressure with an air pump. The air is delivered to your airway through a mask while you sleep. This continuous pressure keeps your airway open.  ¨ Nasal expiratory positive airway pressure (EPAP) device. The EPAP device creates positive air pressure as you exhale. The device consists of single-use valves, which are inserted into each nostril and held in place by adhesive. The valves create very little resistance when you inhale but create much more resistance when you exhale. That increased resistance creates the positive airway pressure. This positive pressure while you exhale keeps your airway open, making it easier to breath when you inhale again.  ¨ Bilevel positive airway pressure (BPAP) device. The BPAP device is used mainly in patients with central sleep apnea. This device is similar to the CPAP device because it also uses an air pump to deliver continuous air pressure through a mask. However, " with the BPAP machine, the pressure is set at two different levels. The pressure when you exhale is lower than the pressure when you inhale.  · Surgery. Typically, surgery is only done if you cannot comply with less invasive treatments or if the less invasive treatments do not improve your condition. Surgery involves removing excess tissue in your airway to create a wider passage way.     This information is not intended to replace advice given to you by your health care provider. Make sure you discuss any questions you have with your health care provider.     Document Released: 12/08/2003 Document Revised: 01/08/2016 Document Reviewed: 04/25/2013  [employer] Interactive Patient Education ©2016 [employer]            [employer] Access Code: Activation code not generated  Current [employer] Status: Active

## 2017-05-16 ENCOUNTER — APPOINTMENT (OUTPATIENT)
Dept: MEDICAL GROUP | Facility: LAB | Age: 57
End: 2017-05-16
Payer: COMMERCIAL

## 2017-09-27 ENCOUNTER — OFFICE VISIT (OUTPATIENT)
Dept: ENDOCRINOLOGY | Facility: MEDICAL CENTER | Age: 57
End: 2017-09-27
Payer: COMMERCIAL

## 2017-09-27 VITALS
DIASTOLIC BLOOD PRESSURE: 90 MMHG | BODY MASS INDEX: 33.29 KG/M2 | HEIGHT: 64 IN | WEIGHT: 195 LBS | OXYGEN SATURATION: 95 % | HEART RATE: 85 BPM | SYSTOLIC BLOOD PRESSURE: 136 MMHG

## 2017-09-27 DIAGNOSIS — E22.1 HYPERPROLACTINEMIA (HCC): ICD-10-CM

## 2017-09-27 DIAGNOSIS — E23.7 PITUITARY LESION (HCC): ICD-10-CM

## 2017-09-27 DIAGNOSIS — E28.1 ANDROGEN EXCESS, FEMALE POST PUBERTY: ICD-10-CM

## 2017-09-27 DIAGNOSIS — E28.2 PCOS (POLYCYSTIC OVARIAN SYNDROME): ICD-10-CM

## 2017-09-27 PROCEDURE — 99214 OFFICE O/P EST MOD 30 MIN: CPT | Performed by: INTERNAL MEDICINE

## 2017-09-27 RX ORDER — MONTELUKAST SODIUM 4 MG/1
1 TABLET, CHEWABLE ORAL DAILY
COMMUNITY
End: 2021-05-11 | Stop reason: SDUPTHER

## 2017-09-28 NOTE — PROGRESS NOTES
Chief Complaint   Patient presents with   • Follow-Up     elevated testosterone             CHIEF COMPLAINT:      Endocrine evaluation.  We have been puzzling about some minor endocrine abnormalities that don’t seem to fit together very well.      History of present illness  She has mild hyperprolactinemia with a current prolactin of 30 but previously as high as 42 and 28.  This had prompted a pituitary MRI done in 2012 which showed a 2 mm lesion in the pituitary of unknown significance.  This has not been followed up and her prolactin still is not at an impressive level.      She also has had elevated testosterone.  No obvious etiology.  Last spring an ultrasound of her pelvis did not reveal an ovarian tumor.  Also ultrasound of the adrenals did not reveal a significant adrenal tumor either.  Her total testosterone previously was 136 and currently is 108 (7-40).  A bio available testosterone is 32 (1.1-14.3).      The testosterone has become a clinical problem.  She is developing beard growth that needs to be shaved regularly for control.  DHEA is normal at 43, a morning cortisol normal at 8 with ACTH also normal at 17.  She does not have Cushing’s disease.      She raises the question of possible PCOS.  I think that was suggested some time in the past. She was given Metformin but gained weight so that was discontinued.     She plans on visiting Dr. Goodman to consider PCOS and treatment of her hirsutism.    In terms of her facial hair growth, I suggested that she might consider spironolactone but I will defer to Dr. Goodman to see how far she takes this.  The patient is in agreement.     ROS:  All other systems reported as negative or unchanged since last exam      Allergies:   Allergies   Allergen Reactions   • Codeine Nausea   • Pcn [Penicillins] Hives       Current medicines including changes today:  Current Outpatient Prescriptions   Medication Sig Dispense Refill   • Cholecalciferol (VITAMIN D PO) Take  by  "mouth.     • Multiple Vitamin (MULTI-VITAMIN DAILY PO) Take  by mouth.     • colestipol (COLESTID) 1 GM Tab Take 1 g by mouth 2 times a day.     • zolpidem (AMBIEN) 5 MG Tab Take 1 Tab by mouth at bedtime as needed for Sleep (1 to 3 po qhs prn insomnia/sleep study. Bring to sleep study.). 3 Tab 0   • fluticasone (FLONASE) 50 MCG/ACT nasal spray Spray 1 Spray in nose 2 times a day. 16 g 0     No current facility-administered medications for this visit.         Past Medical History:   Diagnosis Date   • Hyperprolactinemia (CMS-HCC) 2012    minimal = 32   • Hirsutism 2012    elevated testosterone   • Chickenpox    • Melanoma (CMS-HCC)    • Mumps    • Pituitary abnormality (CMS-HCC)     2mm cyst or microadenoma   • Tonsillitis        PHYSICAL EXAM:    /90   Pulse 85   Ht 1.626 m (5' 4\")   Wt 88.5 kg (195 lb)   SpO2 95%   BMI 33.47 kg/m²      Gen.  Overweight but otherwise appears healthy. No cushingoid features    Skin   facial hair growth in the beard area.    HEENT   visual fields are full to confrontation.    Neck   thyroid gland is small normal    Heart  regular    Extremities  no edema    Neuro  gait and station normal    Psych  appropriate      ASSESSMENT AND RECOMMENDATIONS    1. Pituitary lesion (CMS-HCC)           \"Subtle 2 mm\" lesion 2012. MRI not repeated    2. Hyperprolactinemia (CMS-HCC)            Mild elevation probably unrelated to pituitary lesion. Current level 30 ( 4. 8-23)    3. Androgen excess, female post puberty             Consider spironolactone Rx    4. PCOS (polycystic ovarian syndrome) ?             Question. Patient would like to consult Dr. Goodman        DISPOSITION:   Follow-up in one year       Yemi Velez M.D.    Copies to: Mable Goodman M.D. [phone]  "

## 2017-12-05 ENCOUNTER — HOSPITAL ENCOUNTER (OUTPATIENT)
Facility: MEDICAL CENTER | Age: 57
End: 2017-12-05
Attending: FAMILY MEDICINE
Payer: COMMERCIAL

## 2017-12-05 ENCOUNTER — OFFICE VISIT (OUTPATIENT)
Dept: MEDICAL GROUP | Facility: LAB | Age: 57
End: 2017-12-05
Payer: COMMERCIAL

## 2017-12-05 VITALS
SYSTOLIC BLOOD PRESSURE: 138 MMHG | DIASTOLIC BLOOD PRESSURE: 86 MMHG | TEMPERATURE: 98.2 F | HEIGHT: 64 IN | HEART RATE: 60 BPM | RESPIRATION RATE: 16 BRPM | OXYGEN SATURATION: 97 %

## 2017-12-05 DIAGNOSIS — E28.1 ANDROGEN EXCESS, FEMALE POST PUBERTY: ICD-10-CM

## 2017-12-05 DIAGNOSIS — G47.33 OSA (OBSTRUCTIVE SLEEP APNEA): ICD-10-CM

## 2017-12-05 DIAGNOSIS — Z12.4 SCREENING FOR CERVICAL CANCER: ICD-10-CM

## 2017-12-05 DIAGNOSIS — E78.5 HYPERLIPIDEMIA, UNSPECIFIED HYPERLIPIDEMIA TYPE: ICD-10-CM

## 2017-12-05 DIAGNOSIS — R73.01 ELEVATED FASTING BLOOD SUGAR: ICD-10-CM

## 2017-12-05 DIAGNOSIS — L68.0 HIRSUTISM: ICD-10-CM

## 2017-12-05 DIAGNOSIS — N84.1 CERVICAL POLYP: ICD-10-CM

## 2017-12-05 DIAGNOSIS — Z83.3 FAMILY HISTORY OF DIABETES MELLITUS: ICD-10-CM

## 2017-12-05 DIAGNOSIS — R03.0 ELEVATED BLOOD PRESSURE READING: ICD-10-CM

## 2017-12-05 DIAGNOSIS — K63.5 POLYP OF COLON, UNSPECIFIED PART OF COLON, UNSPECIFIED TYPE: ICD-10-CM

## 2017-12-05 DIAGNOSIS — Z01.419 ENCOUNTER FOR WELL WOMAN EXAM WITH ROUTINE GYNECOLOGICAL EXAM: ICD-10-CM

## 2017-12-05 DIAGNOSIS — Z85.820 HISTORY OF MELANOMA: ICD-10-CM

## 2017-12-05 DIAGNOSIS — E55.9 VITAMIN D DEFICIENCY DISEASE: ICD-10-CM

## 2017-12-05 PROCEDURE — 88175 CYTOPATH C/V AUTO FLUID REDO: CPT

## 2017-12-05 PROCEDURE — 87624 HPV HI-RISK TYP POOLED RSLT: CPT

## 2017-12-05 PROCEDURE — 99396 PREV VISIT EST AGE 40-64: CPT | Mod: 25 | Performed by: FAMILY MEDICINE

## 2017-12-05 PROCEDURE — 99214 OFFICE O/P EST MOD 30 MIN: CPT | Performed by: FAMILY MEDICINE

## 2017-12-05 ASSESSMENT — PATIENT HEALTH QUESTIONNAIRE - PHQ9: CLINICAL INTERPRETATION OF PHQ2 SCORE: 0

## 2017-12-05 NOTE — PROGRESS NOTES
SUBJECTIVE: 57 y.o. female for annual routine gynecologic exam  Chief Complaint   Patient presents with   • Annual Exam      Hyperlipidemia. This is chronic. Due for labs. Not taking medication     Vitamin d deficiency. This is chronic. Due for labs and is taking supplement     She is wondering about PCOS as she does have symtoms. Hirsutism. Had trouble getting and maintaining pregnancies. 5 SAB, one live birth, 6 pregnancies total. Followed by endocrinology. She is seeing Dr Velez. Follow up in one year . Recently had ultrasound of the uterus and the ovaries ordered by Dr Velez. She has a pituitary lesion, androgen excess     States her GI doctor started her on colestipol for diarrhea related to not having her gallbladder. This is helpful she reports     Patient also has elevated blood pressure today. This has been high at times in the past. She does not want to start on BP medication     History of melanoma. This is chronic. She reports she has not seen derm in the past 4 years      Had to put mom in a nursing home as she has gone downhill so quickly in the past few months. Patient is sleeping okay and she is taking advil pm or nyquil sleep to sleep     Mammogram done last    Had colonscopy in May and she had polyps and is to RTC in 3 years     States this year has been difficult. She had to put her mom in a home and also her dad passed away     Obstetric History       T1      L0     SAB5   TAB0   Ectopic0   Molar0   Multiple0   Live Births0        Patient Active Problem List   Diagnosis   • History of melanoma   • Menopausal symptoms   • Hirsutism   • Abnormal thyroid function test   • Seasonal allergies   • Hyperprolactinemia (CMS-HCC)   • Pituitary lesion (CMS-HCC)   • Androgen excess, female post puberty   • MANISH (obstructive sleep apnea)   • Family history of diabetes mellitus   • Elevated fasting blood sugar   • Health care maintenance   • Vitamin D deficiency disease   • Bilateral  tinnitus   • Hyperlipidemia   • Elevated liver enzymes       Last Pap: 2016 and HPV negative and no cells due to lubricant     Family History   Problem Relation Age of Onset   • Diabetes Mother    • Diabetes Father    • Heart Disease Father    • No Known Problems Sister    • No Known Problems Brother    • No Known Problems Daughter    • Cancer Neg Hx    • Hypertension Neg Hx    • Hyperlipidemia Neg Hx    • Stroke Neg Hx    • Psychiatry Neg Hx    • Thyroid Neg Hx        Social History     Social History   • Marital status:      Spouse name: N/A   • Number of children: N/A   • Years of education: N/A     Occupational History   • Not on file.     Social History Main Topics   • Smoking status: Never Smoker   • Smokeless tobacco: Never Used   • Alcohol use 0.0 oz/week      Comment: 3-4 on the weekends   • Drug use: No   • Sexual activity: Yes     Partners: Male     Other Topics Concern   • Not on file     Social History Narrative   • No narrative on file     Sexual history: currently sexually active, single partner   H/O Abnormal Pap no  She  reports that she has never smoked. She has never used smokeless tobacco.        Allergies: Codeine and Pcn [penicillins]     ROS:    Reports mild menopause symptoms of hot flashes, night sweats, sleep disruption, mood changes.Reports vaginal dryness on occasion . Weight gain, dry skin. More hair on her face.   No significant bloating/fluid retention, pelvic pain, or dyspareunia. No vaginal discharge   No breast tenderness, mass, nipple discharge, changes in size or contour, or abnormal cyclic discomfort.  No urinary tract symptoms, no incontinence, no polydipsia, polyuria,  No abdominal pain, change in bowel habits, black or bloody stools.    No unusual headaches, no visual changes, menstrual migraines   No prolonged cough. No dyspnea or chest pain on exertion.  No depression, labile mood, anxiety, libido changes, insomnia.  No temperature intolerance.  Sinus issues   Reports  "some hip pain and will look in to this next year     Exercise: no regular exercise program - walking   Preventive Care:     Current medicines (including changes today)  Current Outpatient Prescriptions   Medication Sig Dispense Refill   • Cholecalciferol (VITAMIN D PO) Take  by mouth.     • Multiple Vitamin (MULTI-VITAMIN DAILY PO) Take  by mouth.     • colestipol (COLESTID) 1 GM Tab Take 1 g by mouth 2 times a day.     • zolpidem (AMBIEN) 5 MG Tab Take 1 Tab by mouth at bedtime as needed for Sleep (1 to 3 po qhs prn insomnia/sleep study. Bring to sleep study.). 3 Tab 0   • fluticasone (FLONASE) 50 MCG/ACT nasal spray Spray 1 Spray in nose 2 times a day. 16 g 0     No current facility-administered medications for this visit.      She  has a past medical history of Chickenpox; Hirsutism (2012); Hyperprolactinemia (CMS-HCC) (2012); Melanoma (CMS-[facility]); Mumps; Pituitary abnormality (CMS-HCC); and Tonsillitis.  She  has a past surgical history that includes cholecystectomy and tonsillectomy.     Family History:   Family History   Problem Relation Age of Onset   • Diabetes Mother    • Diabetes Father    • Heart Disease Father    • Cancer Neg Hx    • Hypertension Neg Hx    • Hyperlipidemia Neg Hx    • Stroke Neg Hx    • Psychiatry Neg Hx    • Thyroid Neg Hx    • No Known Problems Sister    • No Known Problems Brother    • No Known Problems Daughter        OBJECTIVE:     /86   Pulse 60   Temp 36.8 °C (98.2 °F)   Resp 16   Ht 1.626 m (5' 4\")   SpO2 97%     BP recheck 138/86    HEAD AND NECK:  Ears normal.  Throat, oral cavity and tongue normal.  Neck supple. No adenopathy or masses in the neck.  No carotid bruits.  OP narrow. TMs normal. Normal sinus exam. NEURO: Cranial nerves are normal. Coordination and gait normal   CHEST:  Clear, good air entry, no wheezes or rales. HEART:  Regular rate and rhythm.  S1 and S2 normal.  ABDOMEN:  Soft without tenderness, guarding, mass or organomegaly.  EXTREMITIES:  " Extremities warm and well perfused  SKIN: color normal, vascularity normal, no edema, temperature normal        Breast Exam: Performed with instruction during examination. No axillary lymphadenopathy, no skin changes, no dominant masses. No nipple retraction  Pelvic Exam -  Normal external genitalia with no lesions. Vaginal Mucosa:  normal vaginal mucosa . Cervix with polyp and 2 cysts about 4:00, 5:00 position. No cervical motion tenderness.  No adnexal tenderness or enlargement appreciated. Thin Prep Pap is obtained, vaginal swab is not obtained and specimen(s) sent to lab  Rectal: deferred      <ASSESSMENT and PLAN>      1. Encounter for well woman exam with routine gynecological exam  Pap done and sent to lab  - PAP LB, HPV-HR    2. Screening for cervical cancer  Pap done and sent to lab  - PAP LB, HPV-HR    3. Hyperlipidemia, unspecified hyperlipidemia type  Not on medication. Check labs  - LIPID PROFILE; Future  - COMP METABOLIC PANEL; Future    4. MANISH (obstructive sleep apnea)  Patient states that she's not using the CPAP as she cannot sleep with the noise. Recommend reevaluation of this however she does not want to do this    5. Family history of diabetes mellitus  Check fasting blood sugar    6. Elevated fasting blood sugar  Check fasting blood sugar and A1c  - HEMOGLOBIN A1C; Future    7. Vitamin D deficiency disease  Check vitamin D  - VITAMIN D,25 HYDROXY; Future    8. Polyp of colon, unspecified part of colon, unspecified type  Followed by GI    9. Hirsutism  Check LH/FSH ratio. She does have a history of elevated testosterone and hirsutism. She is followed by endocrinology  - LUTEINIZING HORMONE SERUM; Future  - FSH; Future    10. History of melanoma  Patient has not been seen by dermatology over 4 years. Referral to dermatology done   - REFERRAL TO DERMATOLOGY    11. Androgen excess, female post puberty  Followed by endocrinology    12. Elevated blood pressure reading  Discussed with patient. She  states that she does not want to start a blood pressure medication because that means that she is old. She states that she will get the labs done and return to clinic so we can discuss this. Suspect that untreated sleep apnea may be contributing to this problem.  - COMP METABOLIC PANEL; Future  - MICROALBUMIN CREAT RATIO URINE; Future  - CBC WITH DIFFERENTIAL; Future    13. Cervical polyp  Discussed with patient. Referral to gynecology  - REFERRAL TO GYNECOLOGY     Patient is to have labs done then is to return to clinic to discuss.      Please note that this dictation was created using voice recognition software. I have made every reasonable attempt to correct obvious errors, but I expect that there are errors of grammar and possibly content that I did not discover before finalizing the note.

## 2017-12-06 LAB
CYTOLOGY REG CYTOL: NORMAL
HPV HR 12 DNA CVX QL NAA+PROBE: NEGATIVE
HPV16 DNA SPEC QL NAA+PROBE: NEGATIVE
HPV18 DNA SPEC QL NAA+PROBE: NEGATIVE
SPECIMEN SOURCE: NORMAL

## 2017-12-12 LAB
25(OH)D3+25(OH)D2 SERPL-MCNC: 26.7 NG/ML (ref 30–100)
ALBUMIN SERPL-MCNC: 4.5 G/DL (ref 3.5–5.5)
ALBUMIN/CREAT UR: 72.2 MG/G CREAT (ref 0–30)
ALBUMIN/GLOB SERPL: 1.8 {RATIO} (ref 1.2–2.2)
ALP SERPL-CCNC: 82 IU/L (ref 39–117)
ALT SERPL-CCNC: 95 IU/L (ref 0–32)
AST SERPL-CCNC: 55 IU/L (ref 0–40)
BASOPHILS # BLD AUTO: 0 X10E3/UL (ref 0–0.2)
BASOPHILS NFR BLD AUTO: 0 %
BILIRUB SERPL-MCNC: 0.5 MG/DL (ref 0–1.2)
BUN SERPL-MCNC: 16 MG/DL (ref 6–24)
BUN/CREAT SERPL: 23 (ref 9–23)
CALCIUM SERPL-MCNC: 9.4 MG/DL (ref 8.7–10.2)
CHLORIDE SERPL-SCNC: 101 MMOL/L (ref 96–106)
CHOLEST SERPL-MCNC: 218 MG/DL (ref 100–199)
CO2 SERPL-SCNC: 23 MMOL/L (ref 18–29)
COMMENT 011824: ABNORMAL
CREAT SERPL-MCNC: 0.71 MG/DL (ref 0.57–1)
CREAT UR-MCNC: 108.4 MG/DL
EOSINOPHIL # BLD AUTO: 0.1 X10E3/UL (ref 0–0.4)
EOSINOPHIL NFR BLD AUTO: 2 %
ERYTHROCYTE [DISTWIDTH] IN BLOOD BY AUTOMATED COUNT: 12.8 % (ref 12.3–15.4)
FSH SERPL-ACNC: 32 MIU/ML
GFR SERPLBLD CREATININE-BSD FMLA CKD-EPI: 109 ML/MIN/1.73
GFR SERPLBLD CREATININE-BSD FMLA CKD-EPI: 94 ML/MIN/1.73
GLOBULIN SER CALC-MCNC: 2.5 G/DL (ref 1.5–4.5)
GLUCOSE SERPL-MCNC: 141 MG/DL (ref 65–99)
HBA1C MFR BLD: 6.3 % (ref 4.8–5.6)
HCT VFR BLD AUTO: 47.7 % (ref 34–46.6)
HDLC SERPL-MCNC: 53 MG/DL
HGB BLD-MCNC: 15.8 G/DL (ref 11.1–15.9)
IMM GRANULOCYTES # BLD: 0 X10E3/UL (ref 0–0.1)
IMM GRANULOCYTES NFR BLD: 0 %
IMMATURE CELLS  115398: ABNORMAL
LDLC SERPL CALC-MCNC: 134 MG/DL (ref 0–99)
LH SERPL-ACNC: 26 MIU/ML
LYMPHOCYTES # BLD AUTO: 2.1 X10E3/UL (ref 0.7–3.1)
LYMPHOCYTES NFR BLD AUTO: 39 %
MCH RBC QN AUTO: 31.9 PG (ref 26.6–33)
MCHC RBC AUTO-ENTMCNC: 33.1 G/DL (ref 31.5–35.7)
MCV RBC AUTO: 96 FL (ref 79–97)
MICROALBUMIN UR-MCNC: 78.3 UG/ML
MONOCYTES # BLD AUTO: 0.4 X10E3/UL (ref 0.1–0.9)
MONOCYTES NFR BLD AUTO: 7 %
MORPHOLOGY BLD-IMP: ABNORMAL
NEUTROPHILS # BLD AUTO: 2.8 X10E3/UL (ref 1.4–7)
NEUTROPHILS NFR BLD AUTO: 52 %
NRBC BLD AUTO-RTO: ABNORMAL %
PLATELET # BLD AUTO: 287 X10E3/UL (ref 150–379)
POTASSIUM SERPL-SCNC: 4.4 MMOL/L (ref 3.5–5.2)
PROT SERPL-MCNC: 7 G/DL (ref 6–8.5)
RBC # BLD AUTO: 4.96 X10E6/UL (ref 3.77–5.28)
SODIUM SERPL-SCNC: 144 MMOL/L (ref 134–144)
TRIGL SERPL-MCNC: 155 MG/DL (ref 0–149)
VLDLC SERPL CALC-MCNC: 31 MG/DL (ref 5–40)
WBC # BLD AUTO: 5.4 X10E3/UL (ref 3.4–10.8)

## 2017-12-21 ENCOUNTER — OFFICE VISIT (OUTPATIENT)
Dept: MEDICAL GROUP | Facility: LAB | Age: 57
End: 2017-12-21
Payer: COMMERCIAL

## 2017-12-21 VITALS
OXYGEN SATURATION: 100 % | RESPIRATION RATE: 12 BRPM | HEART RATE: 68 BPM | SYSTOLIC BLOOD PRESSURE: 154 MMHG | HEIGHT: 64 IN | BODY MASS INDEX: 32.95 KG/M2 | WEIGHT: 193 LBS | TEMPERATURE: 98.4 F | DIASTOLIC BLOOD PRESSURE: 80 MMHG

## 2017-12-21 DIAGNOSIS — E66.9 OBESITY (BMI 30-39.9): ICD-10-CM

## 2017-12-21 DIAGNOSIS — R22.1 MASS OF LEFT SIDE OF NECK: ICD-10-CM

## 2017-12-21 DIAGNOSIS — I10 HYPERTENSION, UNSPECIFIED TYPE: ICD-10-CM

## 2017-12-21 DIAGNOSIS — Z23 NEED FOR DIPHTHERIA-TETANUS-PERTUSSIS (TDAP) VACCINE, ADULT/ADOLESCENT: ICD-10-CM

## 2017-12-21 DIAGNOSIS — R79.89 ELEVATED LIVER FUNCTION TESTS: ICD-10-CM

## 2017-12-21 PROCEDURE — 90715 TDAP VACCINE 7 YRS/> IM: CPT | Performed by: FAMILY MEDICINE

## 2017-12-21 PROCEDURE — 90471 IMMUNIZATION ADMIN: CPT | Performed by: FAMILY MEDICINE

## 2017-12-21 PROCEDURE — 99214 OFFICE O/P EST MOD 30 MIN: CPT | Mod: 25 | Performed by: FAMILY MEDICINE

## 2017-12-21 RX ORDER — LOSARTAN POTASSIUM 25 MG/1
25 TABLET ORAL DAILY
Qty: 30 TAB | Refills: 3 | Status: SHIPPED | OUTPATIENT
Start: 2017-12-21 | End: 2019-01-23

## 2017-12-21 NOTE — PROGRESS NOTES
Chief Complaint   Patient presents with   • Hyperlipidemia     follow up labs/ bump on neck   • Immunizations     TDAP       HISTORY OF PRESENT ILLNESS: Patient is a 57 y.o. female established patient who presents today to follow up     Elevated A1c.   This is an ongoing issue. She was on metformin in the past and she was gaining weight on the medication so the medication was stopped. She states that she is not losing weight. She is walking and cannot get the weight off. She feels bloated and feels that her abdomen is getting larger. Reports no abdominal pain. She is trying to change her portion sizes. She doesn't eat breakfast. Is trying to cut back on drinking Diet Coke.   Trying to walk 4-5 x a week. She reports that the problem is that she does not eat a lot but she is eating the wrong foods  . She is wondering if she can see a nutritionist    Vitamin D deficiency   This is ongoing. She is taking vitamin D. She is not sure how many international units she is taking but she does take a coming every day. She states vitamin D pills make her stomach upset      Elevated LFT  This is an ongoing problem. Her most recent labs are improved however they're still elevated. She has not had an ultrasound of her gallbladder.     Hyperlipidemia  This is a chronic problem. Currently not on medication.    Elevated blood pressure. Patient states that she stopped checking her blood pressure regularly. She denies any chest pain or shortness of breath.     Patient has a history of a cholecystectomy. Not as gassy with colestipol . Reports her bowel movements are normal. She again feels little bit more bloated than normal but she thinks it's just fat. There is no family history of ovarian cancer. Does report low energy level. She had a transvaginal ultrasound in April and the results were normal.  All    Patient Active Problem List    Diagnosis Date Noted   • Hyperlipidemia 12/19/2016   • Elevated liver enzymes 12/19/2016   • Vitamin  "D deficiency disease 12/05/2016   • Bilateral tinnitus 12/05/2016   • MANISH (obstructive sleep apnea) 10/22/2012   • Family history of diabetes mellitus 10/22/2012   • Elevated fasting blood sugar 10/22/2012   • Hyperprolactinemia (CMS-HCC) 05/07/2012   • Pituitary lesion (CMS-HCC) 05/07/2012   • Androgen excess, female post puberty 05/07/2012   • Abnormal thyroid function test 11/15/2011   • Seasonal allergies 11/15/2011   • History of melanoma 10/19/2011   • Menopausal symptoms 10/19/2011   • Hirsutism 10/19/2011        Allergies:Codeine and Pcn [penicillins]    Current Outpatient Prescriptions   Medication Sig Dispense Refill   • Cholecalciferol (VITAMIN D PO) Take  by mouth.     • Multiple Vitamin (MULTI-VITAMIN DAILY PO) Take  by mouth.     • colestipol (COLESTID) 1 GM Tab Take 1 g by mouth 2 times a day.     • zolpidem (AMBIEN) 5 MG Tab Take 1 Tab by mouth at bedtime as needed for Sleep (1 to 3 po qhs prn insomnia/sleep study. Bring to sleep study.). 3 Tab 0   • fluticasone (FLONASE) 50 MCG/ACT nasal spray Spray 1 Spray in nose 2 times a day. 16 g 0     No current facility-administered medications for this visit.        Social History   Substance Use Topics   • Smoking status: Never Smoker   • Smokeless tobacco: Never Used   • Alcohol use 0.0 oz/week      Comment: 3-4 on the weekends       Social History     Social History Narrative   • No narrative on file       Family History   Problem Relation Age of Onset   • Diabetes Mother    • Diabetes Father    • Heart Disease Father    • No Known Problems Sister    • No Known Problems Brother    • No Known Problems Daughter    • Cancer Neg Hx    • Hypertension Neg Hx    • Hyperlipidemia Neg Hx    • Stroke Neg Hx    • Psychiatry Neg Hx    • Thyroid Neg Hx        ROS:      Exam:    BP (!) 162/82   Pulse 68   Temp 36.9 °C (98.4 °F)   Resp 12   Ht 1.626 m (5' 4\")   Wt 87.5 kg (193 lb)   SpO2 100%   BMI 33.13 kg/m²     General:  Well nourished, well developed female " in NAD    Physical Exam   Constitutional:   Appears well-developed and well-nourished. Is active and cooperative.  Non-toxic appearance.   Neck small LN on lateral lower neck   Head: Normocephalic and atraumatic.   Right Ear: External ear normal.   Left Ear: External ear normal.  Eyes: Conjunctivae, EOM and lids are normal. No scleral icterus.   Cardiovascular: Normal rate, regular rhythm and normal heart sounds.    Pulmonary/Chest: Effort normal and breath sounds normal.   Neurological: Alert.  No tremor. No display of seizure activity. Coordination and gait normal.   Skin: Skin is warm and dry. Patient is not diaphoretic.   Psychiatric: patient has a normal mood and affect; speech is normal and behavior is normal.      Assessment/Plan:     1. Obesity (BMI 30-39.9)  Discussed with patient. Referral to nutritionist.  - Patient identified as having weight management issue.  Appropriate orders and counseling given.  - REFERRAL TO NUTRITION SERVICES    2. Hypertension, unspecified type  Uncontrolled. Start losartan 25 mg once daily. She is to check her blood pressure and record this. She is to follow-up in 2-4 weeks  - losartan (COZAAR) 25 MG Tab; Take 1 Tab by mouth every day.  Dispense: 30 Tab; Refill: 3    3. Elevated liver function tests  Check ultrasound of the liver. Likely fatty liver  - US-LIVER AND BILIARY TREE; Future    4. Need for diphtheria-tetanus-pertussis (Tdap) vaccine, adult/adolescent  Vaccine today  - TDAP VACCINE =>6YO IM    5. Mass of left side of neck  Check ultrasound of the neck  - US-SOFT TISSUES OF HEAD - NECK; Future      Please note that this dictation was created using voice recognition software. I have made every reasonable attempt to correct obvious errors, but I expect that there are errors of grammar and possibly content that I did not discover before finalizing the note.

## 2018-05-15 ENCOUNTER — OFFICE VISIT (OUTPATIENT)
Dept: URGENT CARE | Facility: CLINIC | Age: 58
End: 2018-05-15
Payer: COMMERCIAL

## 2018-05-15 VITALS
HEART RATE: 68 BPM | HEIGHT: 63 IN | RESPIRATION RATE: 16 BRPM | TEMPERATURE: 97.8 F | DIASTOLIC BLOOD PRESSURE: 82 MMHG | OXYGEN SATURATION: 93 % | WEIGHT: 193.34 LBS | BODY MASS INDEX: 34.26 KG/M2 | SYSTOLIC BLOOD PRESSURE: 132 MMHG

## 2018-05-15 DIAGNOSIS — J30.2 SEASONAL ALLERGIC RHINITIS, UNSPECIFIED TRIGGER: ICD-10-CM

## 2018-05-15 DIAGNOSIS — R05.9 COUGH: ICD-10-CM

## 2018-05-15 DIAGNOSIS — R09.81 SINUS CONGESTION: ICD-10-CM

## 2018-05-15 DIAGNOSIS — J06.9 UPPER RESPIRATORY TRACT INFECTION, UNSPECIFIED TYPE: ICD-10-CM

## 2018-05-15 PROCEDURE — 99214 OFFICE O/P EST MOD 30 MIN: CPT | Performed by: PHYSICIAN ASSISTANT

## 2018-05-15 RX ORDER — FLUTICASONE PROPIONATE 50 MCG
1 SPRAY, SUSPENSION (ML) NASAL DAILY
Qty: 16 G | Refills: 0 | Status: SHIPPED | OUTPATIENT
Start: 2018-05-15

## 2018-05-15 RX ORDER — EUCALYPTUS/PEPPERMINT OIL
10 SOLUTION, NON-ORAL NASAL EVERY 6 HOURS PRN
OUTPATIENT
Start: 2018-05-15

## 2018-05-15 RX ORDER — CEFDINIR 300 MG/1
300 CAPSULE ORAL 2 TIMES DAILY
Qty: 20 CAP | Refills: 0 | Status: SHIPPED | OUTPATIENT
Start: 2018-05-15 | End: 2018-05-25

## 2018-05-15 ASSESSMENT — ENCOUNTER SYMPTOMS
COUGH: 0
FOCAL WEAKNESS: 0
VOMITING: 0
EYE REDNESS: 0
DIARRHEA: 0
HEADACHES: 0
MYALGIAS: 0
CHILLS: 0
SENSORY CHANGE: 0
SINUS PAIN: 1
ABDOMINAL PAIN: 0
FEVER: 0
SHORTNESS OF BREATH: 0
WHEEZING: 0
SORE THROAT: 1
EYE DISCHARGE: 0
NAUSEA: 0
SPUTUM PRODUCTION: 0

## 2018-05-15 NOTE — PROGRESS NOTES
"Subjective:   Mable Jarrell is a 57 y.o. female who presents for Nasal Congestion (x 2 days having pressure in face, feels fluid in ear, L side ear pain)        Notes yesterday and today w/ sinus pressure to left max, took some aleve over night, denies fever/chills, c/o some PND, mild ST, denies cough or sinus congestion but fullness and pressure to teeth, c/o fullness to left ear, denies to right side, denies nasuea/vomiting/abdpain/diarrhea/rash. PMH of cholecystectomy, no new s/sx. PMH of sinusitis last was over one year ago. Denies PMH of asthma/bronchitis/pneumonia, PMH of seasonal allerg - no tx. Tried aleve, [facility] antihistamine / D. Does not tx allergies regularly.       Review of Systems   Constitutional: Negative for chills and fever.   HENT: Positive for congestion, sinus pain and sore throat. Negative for ear discharge, ear pain ( fullness to left), hearing loss and tinnitus.    Eyes: Negative for discharge and redness.   Respiratory: Negative for cough, sputum production, shortness of breath and wheezing.    Cardiovascular: Negative for chest pain and leg swelling.   Gastrointestinal: Negative for abdominal pain, diarrhea, nausea and vomiting.   Musculoskeletal: Negative for myalgias.   Skin: Negative for rash.   Neurological: Negative for sensory change, focal weakness and headaches.   Endo/Heme/Allergies: Positive for environmental allergies ( no tx).     Allergies   Allergen Reactions   • Codeine Nausea   • Pcn [Penicillins] Hives      Objective:   /82   Pulse 68   Temp 36.6 °C (97.8 °F)   Resp 16   Ht 1.6 m (5' 3\")   Wt 87.7 kg (193 lb 5.5 oz)   SpO2 93%   BMI 34.25 kg/m²   Physical Exam   Constitutional: She is oriented to person, place, and time. She appears well-developed and well-nourished. No distress.   HENT:   Head: Normocephalic and atraumatic.   Right Ear: External ear and ear canal normal. Tympanic membrane is bulging.   Left Ear: External ear and ear canal normal. " Tympanic membrane is bulging.   Nose: Right sinus exhibits no maxillary sinus tenderness and no frontal sinus tenderness. Left sinus exhibits maxillary sinus tenderness. Left sinus exhibits no frontal sinus tenderness.   Mouth/Throat: Uvula is midline and mucous membranes are normal. Posterior oropharyngeal erythema ( mild PND) present. No oropharyngeal exudate, posterior oropharyngeal edema or tonsillar abscesses.   Eyes: Conjunctivae and lids are normal. Right eye exhibits no discharge. Left eye exhibits no discharge. No scleral icterus.   Neck: Neck supple.   Pulmonary/Chest: Effort normal and breath sounds normal. No respiratory distress. She has no decreased breath sounds. She has no wheezes. She has no rhonchi. She has no rales.   Musculoskeletal: Normal range of motion.   Lymphadenopathy:     She has cervical adenopathy ( mild bilat).   Neurological: She is alert and oriented to person, place, and time. She is not disoriented.   Skin: Skin is warm and dry. She is not diaphoretic. No erythema. No pallor.   Psychiatric: Her speech is normal and behavior is normal.   Nursing note and vitals reviewed.        Assessment/Plan:   Assessment    1. Upper respiratory tract infection, unspecified type  Supportive care is reviewed with patient/caregiver - recommend to push PO fluids and electrolytes, Nsaids/tylenol, netti pot/saline irrig, humidifier in home, flonase, ponaris, antihistamine, decongestant OTC, we discuss txing allerg s/sx - I review w/ pt still very much in viral timeframe w/ clearly undertx'ed seasonal allerg - she would like abx now, we discuss contingent Rx    Contingent antibiotic prescription given to patient to fill upon meeting criteria of guidelines discussed.   If filling,  take full course of Rx, take with probiotics, observe for resolution  Return to clinic with lack of resolution or progression of symptoms.  Has tolerated omnicef in the past (despite childhood PCN allerg)    2. Cough      3.  Sinus congestion    - lidocaine viscous 2% (XYLOCAINE) 2 % Solution; Take 5 mL by mouth as needed for Throat/Mouth Pain (q6hr PRN throat pain, ok to rinse and spit or swallow).  Dispense: 120 mL; Refill: 0  - eucalyptus/peppermint oil (PONARIS NASAL) nasal emollient 0.5 mL; Spray 10 Drops in nose every 6 hours as needed.  - cefdinir (OMNICEF) 300 MG Cap; Take 1 Cap by mouth 2 times a day for 10 days.  Dispense: 20 Cap; Refill: 0  - fluticasone (FLONASE) 50 MCG/ACT nasal spray; Spray 1 Spray in nose every day.  Dispense: 16 g; Refill: 0    4. Seasonal allergic rhinitis, unspecified trigger    Differential diagnosis, natural history, supportive care, and indications for immediate follow-up discussed.

## 2018-12-13 ENCOUNTER — APPOINTMENT (OUTPATIENT)
Dept: MEDICAL GROUP | Facility: PHYSICIAN GROUP | Age: 58
End: 2018-12-13
Payer: COMMERCIAL

## 2019-01-23 ENCOUNTER — OFFICE VISIT (OUTPATIENT)
Dept: MEDICAL GROUP | Facility: PHYSICIAN GROUP | Age: 59
End: 2019-01-23
Payer: COMMERCIAL

## 2019-01-23 VITALS
RESPIRATION RATE: 16 BRPM | OXYGEN SATURATION: 92 % | WEIGHT: 190.4 LBS | HEART RATE: 70 BPM | BODY MASS INDEX: 32.5 KG/M2 | HEIGHT: 64 IN | DIASTOLIC BLOOD PRESSURE: 98 MMHG | SYSTOLIC BLOOD PRESSURE: 140 MMHG | TEMPERATURE: 99 F

## 2019-01-23 DIAGNOSIS — E28.1 ANDROGEN EXCESS, FEMALE POST PUBERTY: ICD-10-CM

## 2019-01-23 DIAGNOSIS — Z11.4 SCREENING FOR HIV (HUMAN IMMUNODEFICIENCY VIRUS): ICD-10-CM

## 2019-01-23 DIAGNOSIS — E66.9 OBESITY (BMI 30-39.9): ICD-10-CM

## 2019-01-23 DIAGNOSIS — Z85.820 HISTORY OF MELANOMA: ICD-10-CM

## 2019-01-23 DIAGNOSIS — R94.6 ABNORMAL THYROID FUNCTION TEST: Primary | ICD-10-CM

## 2019-01-23 DIAGNOSIS — E23.7 PITUITARY LESION (HCC): ICD-10-CM

## 2019-01-23 DIAGNOSIS — Z90.49 S/P CHOLECYSTECTOMY: ICD-10-CM

## 2019-01-23 DIAGNOSIS — E22.1 HYPERPROLACTINEMIA (HCC): ICD-10-CM

## 2019-01-23 DIAGNOSIS — Z23 NEED FOR VACCINATION: ICD-10-CM

## 2019-01-23 DIAGNOSIS — R74.8 ELEVATED LIVER ENZYMES: ICD-10-CM

## 2019-01-23 DIAGNOSIS — Z12.39 BREAST CANCER SCREENING: ICD-10-CM

## 2019-01-23 PROCEDURE — 99215 OFFICE O/P EST HI 40 MIN: CPT | Performed by: FAMILY MEDICINE

## 2019-01-23 ASSESSMENT — PATIENT HEALTH QUESTIONNAIRE - PHQ9
CLINICAL INTERPRETATION OF PHQ2 SCORE: 4
SUM OF ALL RESPONSES TO PHQ QUESTIONS 1-9: 16
5. POOR APPETITE OR OVEREATING: 2 - MORE THAN HALF THE DAYS

## 2019-01-23 ASSESSMENT — PAIN SCALES - GENERAL: PAINLEVEL: NO PAIN

## 2019-01-23 NOTE — ASSESSMENT & PLAN NOTE
This is a chronic condition.  Weight change: lost 3 lbs since 5/2018  Diet: cutting back on alcohol, working on a healthy diet, limiting late night eating, portion control  Exercise: walking

## 2019-01-23 NOTE — ASSESSMENT & PLAN NOTE
This is a chronic condition.  She reports since having her gallbladder removed that she will get intermittent diarrhea largely associated with fatty foods.  She limits her fats in her diet and also uses colestipol daily which controls her diarrhea fairly well.

## 2019-01-23 NOTE — ASSESSMENT & PLAN NOTE
This is a chronic condition. She has had elevated liver enzymes since 2012.  Her last labs were done in December, 2017.

## 2019-01-23 NOTE — ASSESSMENT & PLAN NOTE
Her thyroid was last checked in 2016. She denies cold intolerance, constipation. She does have a lot of fatigue and weight gain.

## 2019-01-23 NOTE — PROGRESS NOTES
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    HISTORY OF THE PRESENT ILLNESS: Patient is a 58 y.o. female. This pleasant patient is here today to establish care and discuss hormones. Her prior PCP was Dr. Goodman.    Androgen excess, female post puberty  This is a chronic condition. Her testosterone has been elevated since 2011.    Obesity (BMI 30-39.9)  This is a chronic condition.  Weight change: lost 3 lbs since 5/2018  Diet: cutting back on alcohol, working on a healthy diet, limiting late night eating, portion control  Exercise: walking      Abnormal thyroid function test  Her thyroid was last checked in 2016. She denies cold intolerance, constipation. She does have a lot of fatigue and weight gain.    Hyperprolactinemia  This is a chronic condition, since her 20s. It is related to the pituitary lesion diagnosed at [facility]. She gets no breast discharge. No loss of peripheral vision. She does have gradual worsening of her vision in general.    Elevated liver enzymes  This is a chronic condition. She has had elevated liver enzymes since 2012.  Her last labs were done in December, 2017.    S/P cholecystectomy  This is a chronic condition.  She reports since having her gallbladder removed that she will get intermittent diarrhea largely associated with fatty foods.  She limits her fats in her diet and also uses colestipol daily which controls her diarrhea fairly well.      Allergies: Codeine and Pcn [penicillins]    Current Outpatient Prescriptions Ordered in [facility]   Medication Sig Dispense Refill   • Zoster Vac Recomb Adjuvanted (SHINGRIX) 50 MCG Recon Susp 0.5 mL by Intramuscular route Once  for 1 dose. 0.5 mL 1   • fluticasone (FLONASE) 50 MCG/ACT nasal spray Spray 1 Spray in nose every day. 16 g 0   • Cholecalciferol (VITAMIN D PO) Take 2,000 Int'l Units by mouth every day.     • Multiple Vitamin (MULTI-VITAMIN DAILY PO) Take  by mouth.     • colestipol (COLESTID) 1 GM Tab Take 1 g by mouth every day.       Current Facility-Administered Medications Ordered in Epic   Medication Dose Route Frequency Provider Last Rate Last Dose   • eucalyptus/peppermint oil (PONARIS NASAL) nasal emollient 0.5 mL  10 Drop Nasal Q6HRS PRN MIKI [email or web address].-[email or web address]           Past Medical History:   Diagnosis Date   • Chickenpox    • Hirsutism 2012    elevated testosterone   • Hyperprolactinemia (HCC) 2012    minimal = 32   • Melanoma (HCC)    • Mumps    • Pituitary abnormality (HCC)     2mm cyst or microadenoma   • Tonsillitis        Past Surgical History:   Procedure Laterality Date   • CHOLECYSTECTOMY     • TONSILLECTOMY         Social History   Substance Use Topics   • Smoking status: Never Smoker   • Smokeless tobacco: Never Used   • Alcohol use 0.0 oz/week      Comment: 2 on the weekends       Social History     Social History Narrative   • No narrative on file       Family History   Problem Relation Age of Onset   • Diabetes Mother    • Diabetes Father    • Heart Disease Father    • No Known Problems Sister    • No Known Problems Brother    • No Known Problems Daughter    • Cancer Neg Hx    • Hypertension Neg Hx    • Hyperlipidemia Neg Hx    • Stroke Neg Hx    • Psychiatry Neg Hx    • Thyroid Neg Hx        Health Maintenance: Completed    ROS:   Gen: no fevers/chills, no changes in weight  Eyes: no changes in vision  ENT: no sore throat, + rhinorrhea  Pulm: no sob, + cough - clear her throat often  CV: no chest pain  GI: no nausea/vomiting, + diarrhea - related to cholecytectomy  : no dysuria  MSk: no myalgias  Skin: no rash  Neuro: no headaches      Objective:     Exam: Blood pressure 140/98, pulse 70, temperature  "37.2 °C (99 °F), temperature source Temporal, resp. rate 16, height 1.626 m (5' 4\"), weight 86.4 kg (190 lb 6.4 oz), SpO2 92 %, not currently breastfeeding. Body mass index is 32.68 kg/m².    General: Normal appearing. No distress.  HEENT: Normocephalic. Eyes conjunctiva clear lids without ptosis, pupils equal and reactive to light accommodation, ears normal shape and contour, canals are clear bilaterally, tympanic membranes are benign, nasal mucosa mildly edematous, oropharynx is without erythema, edema or exudates.   Neck: Supple without JVD. Thyroid is not enlarged.  Pulmonary: Clear to ausculation.  Normal effort. No rales, ronchi, or wheezing.  Cardiovascular: Regular rate and rhythm with +2/6 systolic crescendo-decrescendo murmur. Carotid and radial pulses are intact and equal bilaterally.  Abdomen: Soft, nontender, nondistended. Normal bowel sounds. Liver and spleen are not palpable  Neurologic: Grossly nonfocal  Lymph: No cervical or supraclavicular lymph nodes are palpable  Skin: Warm and dry.  No obvious lesions.  Musculoskeletal: Normal gait. No extremity cyanosis, clubbing, or edema.  Psych: Normal mood and affect. Alert and oriented x3. Judgment and insight is normal.    Assessment & Plan:   58 y.o. female with the following -    1. Abnormal thyroid function test  This is a chronic condition.  He had an abnormal TSH in 2011 and her last check was in 2016 showing her TSH had normalized.  She denies constipation or cold intolerance but she does report lots of fatigue and some weight gain.  - TSH WITH REFLEX TO FT4; Future    2. Androgen excess, female post puberty  This is a chronic condition.  She has had an elevated testosterone since 2011.  Even at its highest her testosterone is less than 150 making an ovarian mass producing testosterone less likely.  She has had her 17-OHP checked, which was slightly elevated but not enough to consider nonclassical congenital adrenal hyperplasia.  It is possible she " has a PCO S component leading to hirsutism but she has a history of hyperprolactinemia which could be complicating things.  She does have an endocrinologist and plans to follow-up with them to check on this further per    3. Elevated liver enzymes  This is a chronic condition.  Since 2012 she had elevated liver enzymes and her last labs were in December, 2017  - COMP METABOLIC PANEL; Future  - HEP C VIRUS ANTIBODY; Future    4. Hyperprolactinemia (HCC)  5. Pituitary lesion (HCC)  This is a chronic condition.  She reports that she has had hyperprolactinemia since her 20s.  She was diagnosed with a pituitary lesion at [facility] in her early 20s.  Her prolactin is always slightly elevated but she denies any/nipple discharge.  She denies any changes in her vision to indicate that the pituitary lesion is growing.  She plans to follow-up with her endocrinologist.    6. S/P cholecystectomy  This is a chronic condition.  Reports that she gets intermittent diarrhea following fatty foods after she had her gallbladder removed.  She is able to control it fairly well with diet changes and colestipol daily.    7. History of melanoma  She had melanoma in 2003 on her left leg.  She reports that they are able to surgically excise it with clear margins and she did not require any chemotherapy or radiation.  She was following dermatology regularly but admits she has not seen them in a reestablished.  - REFERRAL TO DERMATOLOGY    8. Obesity (BMI 30-39.9)  This is a chronic condition, stable.  She reports that she is try to work on a healthier diet and be more physically active.    9. Elevated PHQ-9 Screen  Today her PHQ 9 screen was 16/27, indicating possible moderate depression.  However, her mother did pass away recently in December so she could also be going through acute grief.  Therefore I am not going to call it depression at this time and at her next appointment we will see how her mood is doing.  If she continues have issues  with her mood we will consider a referral to therapy.    10. Breast cancer screening  - MA-SCREEN MAMMO W/CAD-BILAT; Future    11. Screening for HIV (human immunodeficiency virus)  - HIV AG/AB COMBO ASSAY SCREENING; Future    12. Need for vaccination  - Zoster Vac Recomb Adjuvanted (SHINGRIX) 50 MCG Recon Susp; 0.5 mL by Intramuscular route Once for 1 dose.  Dispense: 0.5 mL; Refill: 1    Patient was seen for 45 minutes face to face of which, at least 50% of the time was spent counseling regarding goals, mood, and health maintenance.    Return if symptoms worsen or fail to improve.    Please note that this dictation was created using voice recognition software. I have made every reasonable attempt to correct obvious errors, but I expect that there are errors of grammar and possibly content that I did not discover before finalizing the note.

## 2019-01-23 NOTE — ASSESSMENT & PLAN NOTE
This is a chronic condition, since her 20s. It is related to the pituitary lesion diagnosed at [facility]. She gets no breast discharge. No loss of peripheral vision. She does have gradual worsening of her vision in general.

## 2019-02-13 ENCOUNTER — APPOINTMENT (OUTPATIENT)
Dept: MEDICAL GROUP | Facility: PHYSICIAN GROUP | Age: 59
End: 2019-02-13
Payer: COMMERCIAL

## 2019-10-23 ENCOUNTER — TELEPHONE (OUTPATIENT)
Dept: MEDICAL GROUP | Facility: PHYSICIAN GROUP | Age: 59
End: 2019-10-23

## 2019-10-23 NOTE — TELEPHONE ENCOUNTER
VOICEMAIL  1. Caller Name: Mable                     Call Back Number: [phone] (home)     2. Message: Patient called requesting appt for flu shot.     3. Patient approves office to leave a detailed voicemail/MyChart message: yes

## 2019-10-23 NOTE — TELEPHONE ENCOUNTER
Phone Number Called: [phone] (home)     Call outcome: spoke to patient regarding message below    Message: Spoke with patient and she will come in to the clinic between 3-6 for the flu shot clinic.  Her  is a patient of  and will also come today.

## 2019-10-29 ENCOUNTER — NON-PROVIDER VISIT (OUTPATIENT)
Dept: MEDICAL GROUP | Facility: PHYSICIAN GROUP | Age: 59
End: 2019-10-29
Payer: COMMERCIAL

## 2019-10-29 DIAGNOSIS — Z23 NEED FOR VACCINATION: ICD-10-CM

## 2019-10-29 PROCEDURE — 90471 IMMUNIZATION ADMIN: CPT | Performed by: FAMILY MEDICINE

## 2019-10-29 PROCEDURE — 90686 IIV4 VACC NO PRSV 0.5 ML IM: CPT | Performed by: FAMILY MEDICINE

## 2019-10-29 NOTE — PROGRESS NOTES
"Mable Jarrell is a 59 y.o. female here for a non-provider visit for:   FLU    Reason for immunization: Annual Flu Vaccine  Immunization records indicate need for vaccine: Yes, confirmed with Epic  Minimum interval has been met for this vaccine: Yes  ABN completed: No    Order and dose verified by: Sherine Cleary MD  VIS Dated  8/15/19 was given to patient: Yes  All IAC Questionnaire questions were answered \"No.\"    Patient tolerated injection and no adverse effects were observed or reported: Yes    Pt scheduled for next dose in series: No  "

## 2019-11-06 ENCOUNTER — OFFICE VISIT (OUTPATIENT)
Dept: URGENT CARE | Facility: CLINIC | Age: 59
End: 2019-11-06
Payer: COMMERCIAL

## 2019-11-06 VITALS
BODY MASS INDEX: 33.13 KG/M2 | OXYGEN SATURATION: 91 % | TEMPERATURE: 99.1 F | HEART RATE: 84 BPM | WEIGHT: 187 LBS | SYSTOLIC BLOOD PRESSURE: 140 MMHG | DIASTOLIC BLOOD PRESSURE: 80 MMHG | RESPIRATION RATE: 20 BRPM | HEIGHT: 63 IN

## 2019-11-06 DIAGNOSIS — J40 BRONCHITIS: Primary | ICD-10-CM

## 2019-11-06 DIAGNOSIS — J06.9 URI WITH COUGH AND CONGESTION: ICD-10-CM

## 2019-11-06 PROCEDURE — 99214 OFFICE O/P EST MOD 30 MIN: CPT | Performed by: PHYSICIAN ASSISTANT

## 2019-11-06 RX ORDER — DOXYCYCLINE HYCLATE 100 MG
100 TABLET ORAL 2 TIMES DAILY
Qty: 14 TAB | Refills: 0 | Status: SHIPPED | OUTPATIENT
Start: 2019-11-06 | End: 2019-11-13

## 2019-11-06 RX ORDER — DEXTROMETHORPHAN HYDROBROMIDE AND PROMETHAZINE HYDROCHLORIDE 15; 6.25 MG/5ML; MG/5ML
5 SYRUP ORAL EVERY 4 HOURS PRN
Qty: 120 ML | Refills: 0 | Status: SHIPPED | OUTPATIENT
Start: 2019-11-06 | End: 2020-06-22

## 2019-11-06 RX ORDER — METHYLPREDNISOLONE 4 MG/1
TABLET ORAL
Qty: 21 TAB | Refills: 0 | Status: SHIPPED | OUTPATIENT
Start: 2019-11-06 | End: 2020-06-22

## 2019-11-07 NOTE — PROGRESS NOTES
Subjective:      Pt is a 59 y.o. female who presents with Cough (x1wk, cough, side pain when coughing, rattling when laying down, chest congestion)            HPI  This is a new problem. PT presents to  clinic today complaining of sore throat, rattling cough, fatigue, runny nose, wheezing and SOB. PT denies CP, NVD, abdominal pain, joint pain. PT states these symptoms began around 7 days ago. PT states the pain is a 3-4/10 with coughing fits, aching in nature and worse at night. Pt has not taken any RX medications for this condition. The pt's medication list, problem list, and allergies have been evaluated and reviewed during today's visit.    PMH:  Past Medical History:   Diagnosis Date   • Chickenpox    • Hirsutism     elevated testosterone   • Hyperprolactinemia (HCC)     minimal = 32   • Melanoma (HCC)    • Mumps    • Pituitary abnormality (HCC)     2mm cyst or microadenoma   • Tonsillitis        PSH:  Past Surgical History:   Procedure Laterality Date   • CHOLECYSTECTOMY     • TONSILLECTOMY         Fam Hx:    family history includes Diabetes in her father and mother; Heart Disease in her father; No Known Problems in her brother, daughter, and sister.  Family Status   Relation Name Status   • Mo     • Fa     • Sis  Alive   • Bro  Alive   • Dexter  Alive   • Neg Hx  (Not Specified)       Soc HX:  Social History     Socioeconomic History   • Marital status:      Spouse name: Not on file   • Number of children: Not on file   • Years of education: Not on file   • Highest education level: Not on file   Occupational History   • Not on file   Social Needs   • Financial resource strain: Not on file   • Food insecurity:     Worry: Not on file     Inability: Not on file   • Transportation needs:     Medical: Not on file     Non-medical: Not on file   Tobacco Use   • Smoking status: Never Smoker   • Smokeless tobacco: Never Used   Substance and Sexual Activity   • Alcohol use: Yes      Alcohol/week: 0.0 oz     Comment: 2 on the weekends   • Drug use: No   • Sexual activity: Yes     Partners: Male   Lifestyle   • Physical activity:     Days per week: Not on file     Minutes per session: Not on file   • Stress: Not on file   Relationships   • Social connections:     Talks on phone: Not on file     Gets together: Not on file     Attends Restorationist service: Not on file     Active member of club or organization: Not on file     Attends meetings of clubs or organizations: Not on file     Relationship status: Not on file   • Intimate partner violence:     Fear of current or ex partner: Not on file     Emotionally abused: Not on file     Physically abused: Not on file     Forced sexual activity: Not on file   Other Topics Concern   • Not on file   Social History Narrative   • Not on file         Medications:    Current Outpatient Medications:   •  doxycycline (VIBRAMYCIN) 100 MG Tab, Take 1 Tab by mouth 2 times a day for 7 days., Disp: 14 Tab, Rfl: 0  •  methylPREDNISolone (MEDROL DOSEPAK) 4 MG Tablet Therapy Pack, Follow schedule on package instructions., Disp: 21 Tab, Rfl: 0  •  promethazine-dextromethorphan (PROMETHAZINE-DM) 6.25-15 MG/5ML syrup, Take 5 mL by mouth every four hours as needed for Cough., Disp: 120 mL, Rfl: 0  •  fluticasone (FLONASE) 50 MCG/ACT nasal spray, Spray 1 Spray in nose every day., Disp: 16 g, Rfl: 0  •  Cholecalciferol (VITAMIN D PO), Take 2,000 Int'l Units by mouth every day., Disp: , Rfl:   •  Multiple Vitamin (MULTI-VITAMIN DAILY PO), Take  by mouth., Disp: , Rfl:   •  colestipol (COLESTID) 1 GM Tab, Take 1 g by mouth every day., Disp: , Rfl:     Current Facility-Administered Medications:   •  eucalyptus/peppermint oil (PONARIS NASAL) nasal emollient 0.5 mL, 10 Drop, Nasal, Q6HRS PRN, Kranthi Robbins P.A.-C.      Allergies:  Codeine and Pcn [penicillins]    ROS  Review of Systems   Constitutional: Positive for malaise/fatigue. Negative for fever and diaphoresis.   HENT:  "Positive for congestion and sore throat. Negative for ear discharge, hearing loss, nosebleeds and tinnitus.    Eyes: Negative for blurred vision, double vision and photophobia.   Respiratory: Positive for cough, sputum production, shortness of breath and wheezing. Negative for hemoptysis.    Cardiovascular: Negative for chest pain and palpitations.   Gastrointestinal: Negative for nausea, vomiting, abdominal pain, diarrhea and constipation.   Genitourinary: Negative for dysuria and flank pain.   Musculoskeletal: Negative for joint pain and myalgias.   Skin: Negative for itching and rash.   Neurological:  Negative for dizziness, tingling and weakness.   Endo/Heme/Allergies: Does not bruise/bleed easily.   Psychiatric/Behavioral: Negative for depression. The patient is not nervous/anxious.             Objective:     /80 (BP Location: Left arm, Patient Position: Sitting, BP Cuff Size: Adult)   Pulse 84   Temp 37.3 °C (99.1 °F) (Temporal)   Resp 20   Ht 1.6 m (5' 3\")   Wt 84.8 kg (187 lb)   SpO2 91%   BMI 33.13 kg/m²      Physical Exam      Physical Exam   Constitutional: PT is oriented to person, place, and time. PT appears well-developed and well-nourished. No distress.   HENT:   Head: Normocephalic and atraumatic.   Right Ear: Hearing, tympanic membrane, external ear and ear canal normal.   Left Ear: Hearing, tympanic membrane, external ear and ear canal normal.   Nose: Mucosal edema, rhinorrhea and sinus tenderness present. Right sinus exhibits frontal sinus tenderness. Left sinus exhibits frontal sinus tenderness.   Mouth/Throat: Uvula is midline. Mucous membranes are pale. Posterior oropharyngeal edema and posterior oropharyngeal erythema present. No oropharyngeal exudate.   Eyes: Conjunctivae normal and EOM are normal. Pupils are equal, round, and reactive to light. Right eye exhibits no discharge. Left eye exhibits no discharge.   Neck: Normal range of motion. Neck supple. No thyromegaly present. "   Cardiovascular: Normal rate, regular rhythm, normal heart sounds and intact distal pulses.  Exam reveals no gallop and no friction rub.    No murmur heard.  Pulmonary/Chest: Effort normal. No respiratory distress. PT has wheezes. PT has no rales. PT exhibits tenderness.   Abdominal: Soft. Bowel sounds are normal. PT exhibits no distension and no mass. There is no tenderness. There is no rebound and no guarding.   Musculoskeletal: Normal range of motion. PT exhibits no edema and no tenderness.   Lymphadenopathy:     PT has no cervical adenopathy.   Neurological: Pt is alert and oriented to person, place, and time. Pt has normal reflexes. No cranial nerve deficit.   Skin: Skin is warm and dry. No rash noted. No erythema.   Psychiatric: PT has a normal mood and affect. Pt behavior is normal. Judgment and thought content normal.          Assessment/Plan:       1. Bronchitis    - doxycycline (VIBRAMYCIN) 100 MG Tab; Take 1 Tab by mouth 2 times a day for 7 days.  Dispense: 14 Tab; Refill: 0  - methylPREDNISolone (MEDROL DOSEPAK) 4 MG Tablet Therapy Pack; Follow schedule on package instructions.  Dispense: 21 Tab; Refill: 0    2. URI with cough and congestion    - methylPREDNISolone (MEDROL DOSEPAK) 4 MG Tablet Therapy Pack; Follow schedule on package instructions.  Dispense: 21 Tab; Refill: 0  - promethazine-dextromethorphan (PROMETHAZINE-DM) 6.25-15 MG/5ML syrup; Take 5 mL by mouth every four hours as needed for Cough.  Dispense: 120 mL; Refill: 0    Concern for worsening symptoms of URI which shortly could transition to pneumonia and worsening sinus congestion and infection with powerful cough keeping pt up at night as they must sleep upright to avoid coughing fits.  Diff DX: Bronchitis, Sinusitis, Pneumonia, Influenza, Viral URI, Allergies  Rest, fluids encouraged.  OTC decongestant for congestion/cough  AVS with medical info given.  Pt was in full understanding and agreement with the plan.  Differential diagnosis,  natural history, supportive care, and indications for immediate follow-up discussed. All questions answered. Patient agrees with the plan of care.  Follow-up as needed if symptoms worsen or fail to improve.

## 2020-06-15 ENCOUNTER — HOSPITAL ENCOUNTER (OUTPATIENT)
Facility: MEDICAL CENTER | Age: 60
End: 2020-06-15
Attending: FAMILY MEDICINE
Payer: COMMERCIAL

## 2020-06-15 ENCOUNTER — OFFICE VISIT (OUTPATIENT)
Dept: MEDICAL GROUP | Facility: PHYSICIAN GROUP | Age: 60
End: 2020-06-15
Payer: COMMERCIAL

## 2020-06-15 VITALS
HEART RATE: 60 BPM | DIASTOLIC BLOOD PRESSURE: 88 MMHG | OXYGEN SATURATION: 96 % | BODY MASS INDEX: 33.98 KG/M2 | WEIGHT: 191.8 LBS | TEMPERATURE: 98.7 F | HEIGHT: 63 IN | SYSTOLIC BLOOD PRESSURE: 174 MMHG | RESPIRATION RATE: 16 BRPM

## 2020-06-15 DIAGNOSIS — R39.15 URINARY URGENCY: ICD-10-CM

## 2020-06-15 DIAGNOSIS — Z11.59 NEED FOR HEPATITIS C SCREENING TEST: ICD-10-CM

## 2020-06-15 DIAGNOSIS — G47.33 OSA (OBSTRUCTIVE SLEEP APNEA): ICD-10-CM

## 2020-06-15 DIAGNOSIS — E66.9 OBESITY (BMI 30-39.9): ICD-10-CM

## 2020-06-15 DIAGNOSIS — R94.6 ABNORMAL THYROID FUNCTION TEST: ICD-10-CM

## 2020-06-15 DIAGNOSIS — R52 PAIN: Primary | ICD-10-CM

## 2020-06-15 LAB
APPEARANCE UR: CLEAR
BILIRUB UR STRIP-MCNC: NEGATIVE MG/DL
COLOR UR AUTO: YELLOW
GLUCOSE UR STRIP.AUTO-MCNC: 500 MG/DL
KETONES UR STRIP.AUTO-MCNC: NEGATIVE MG/DL
LEUKOCYTE ESTERASE UR QL STRIP.AUTO: NEGATIVE
NITRITE UR QL STRIP.AUTO: NEGATIVE
PH UR STRIP.AUTO: 5.5 [PH] (ref 5–8)
PROT UR QL STRIP: ABNORMAL MG/DL
RBC UR QL AUTO: ABNORMAL
SP GR UR STRIP.AUTO: 1.02
UROBILINOGEN UR STRIP-MCNC: 0.2 MG/DL

## 2020-06-15 PROCEDURE — 99214 OFFICE O/P EST MOD 30 MIN: CPT | Performed by: FAMILY MEDICINE

## 2020-06-15 PROCEDURE — 87086 URINE CULTURE/COLONY COUNT: CPT

## 2020-06-15 PROCEDURE — 87077 CULTURE AEROBIC IDENTIFY: CPT

## 2020-06-15 PROCEDURE — 87186 SC STD MICRODIL/AGAR DIL: CPT

## 2020-06-15 PROCEDURE — 81002 URINALYSIS NONAUTO W/O SCOPE: CPT | Performed by: FAMILY MEDICINE

## 2020-06-15 PROCEDURE — 99000 SPECIMEN HANDLING OFFICE-LAB: CPT | Performed by: FAMILY MEDICINE

## 2020-06-15 ASSESSMENT — PATIENT HEALTH QUESTIONNAIRE - PHQ9: CLINICAL INTERPRETATION OF PHQ2 SCORE: 0

## 2020-06-15 NOTE — PROGRESS NOTES
"Chief Complaint   Patient presents with   • LLQ Pain     1 week    • Urinary Frequency     \"shocking sensation\" since yesterday        HPI:  Symptom onset: 1 days ago   Current symptoms: Urgent, frequent voids. No blood noted in urine.  Since onset symptoms are: Unchanged  Treatments tried: OTC antispasm med.  Associated symptoms: Negative for fever, flank pain, nausea and vomiting, vaginal discharge, pelvic pain.  History is negative for frequent UTI.     Pain  This is a new condition.  7 days ago she developed a pain along her left side that was intermittent.  She states it has not gotten very severe but is more irritating and aggravating than anything else.  Yesterday it got just irritating enough that she actually laid down on her side for little while which did resolve the pain and she reports since then the pain has not returned.    ROS:  Denies fever, chills, vomiting or abdominal pain.     OBJECTIVE:  BP (!) 164/88 (BP Location: Right arm, Patient Position: Sitting, BP Cuff Size: Adult)   Pulse 60   Temp 37.1 °C (98.7 °F) (Temporal)   Resp 16   Ht 1.6 m (5' 3\")   Wt 87 kg (191 lb 12.8 oz)   SpO2 96%   Gen: Alert, NAD.  Chest: Lungs clear to auscultation, CV RRR.  Abdomen: Soft, tender in suprapubic region. No CVAT. Normal bowel sounds.     Lab Results   Component Value Date    POCCOLOR Honey 07/14/2016    POCAPPEAR Cludy 07/14/2016    POCLEUKEST 1+ 07/14/2016    POCNITRITE pos 07/14/2016    POCUROBILIGE neg 07/14/2016    POCPROTEIN 300 07/14/2016    POCURPH 6.5 07/14/2016    POCBLOOD 3+ 07/14/2016    POCSPGRV 1.030 07/14/2016    POCKETONES neg 07/14/2016    POCBILIRUBIN neg 07/14/2016    POCGLUCUA neg 07/14/2016          ASSESSMENT/PLAN:     1. Pain  This is a new condition, recently resolved.  About a week ago she developed left-sided pain that was irritating and slightly aggravating.  It was intermittent and since yesterday has not appeared.  We will continue to monitor.    2. Urinary urgency  This " is a new condition.  She said starting yesterday she developed some urinary urgency but does not have much volume and she is going to the bathroom more frequently but there is no associated dysuria.  However she has noticed since yesterday on occasion she will get a millisecond burst of shocking/cramping in the labia.  UA showed large blood, 500 glucose, and protein.  Does not appear to be an infection will run to get a culture just to make sure since there is blood in the urine.  Sounds like more this may be related to diabetes which would be a new diagnosis so we can order labs to verify.  - POCT Urinalysis  - URINE CULTURE(NEW); Future    3. Abnormal thyroid function test  She has a history of abnormal thyroid test and a history of pituitary dysfunction so we will check labs to make sure her thyroid is still functioning normally.  - TSH WITH REFLEX TO FT4; Future    4. Obesity (BMI 30-39.9)  This is a chronic condition, stable.  Will check some screening labs.  - Comp Metabolic Panel; Future  - HEMOGLOBIN A1C; Future  - Lipid Profile; Future    5. MANISH (obstructive sleep apnea)  This is a chronic condition, stable.  She is not using CPAP in order to check to make sure her hemoglobin and hematocrit are not elevated.  - CBC WITH DIFFERENTIAL; Future    6. Need for hepatitis C screening test  She qualifies for hepatitis C screening per CDC guidelines.  - HCV Scrn ( [id number] [employer]); Future

## 2020-06-15 NOTE — ASSESSMENT & PLAN NOTE
This is a new condition.  7 days ago she developed a pain along her left side that was intermittent.  She states it has not gotten very severe but is more irritating and aggravating than anything else.  Yesterday it got just irritating enough that she actually laid down on her side for little while which did resolve the pain and she reports since then the pain has not returned.

## 2020-06-17 LAB
ALBUMIN SERPL-MCNC: 4.8 G/DL (ref 3.8–4.9)
ALBUMIN/GLOB SERPL: 1.8 {RATIO} (ref 1.2–2.2)
ALP SERPL-CCNC: 86 IU/L (ref 39–117)
ALT SERPL-CCNC: 85 IU/L (ref 0–32)
AST SERPL-CCNC: 57 IU/L (ref 0–40)
BASOPHILS # BLD AUTO: 0.1 X10E3/UL (ref 0–0.2)
BASOPHILS NFR BLD AUTO: 1 %
BILIRUB SERPL-MCNC: 0.7 MG/DL (ref 0–1.2)
BUN SERPL-MCNC: 17 MG/DL (ref 8–27)
BUN/CREAT SERPL: 25 (ref 12–28)
CALCIUM SERPL-MCNC: 10.1 MG/DL (ref 8.7–10.3)
CHLORIDE SERPL-SCNC: 102 MMOL/L (ref 96–106)
CHOLEST SERPL-MCNC: 228 MG/DL (ref 100–199)
CO2 SERPL-SCNC: 22 MMOL/L (ref 20–29)
CREAT SERPL-MCNC: 0.69 MG/DL (ref 0.57–1)
EOSINOPHIL # BLD AUTO: 0.2 X10E3/UL (ref 0–0.4)
EOSINOPHIL NFR BLD AUTO: 2 %
ERYTHROCYTE [DISTWIDTH] IN BLOOD BY AUTOMATED COUNT: 12.1 % (ref 11.7–15.4)
GLOBULIN SER CALC-MCNC: 2.6 G/DL (ref 1.5–4.5)
GLUCOSE SERPL-MCNC: 195 MG/DL (ref 65–99)
HBA1C MFR BLD: 7.7 % (ref 4.8–5.6)
HCT VFR BLD AUTO: 47.2 % (ref 34–46.6)
HCV AB S/CO SERPL IA: <0.1 S/CO RATIO (ref 0–0.9)
HDLC SERPL-MCNC: 53 MG/DL
HGB BLD-MCNC: 16.4 G/DL (ref 11.1–15.9)
IMM GRANULOCYTES # BLD AUTO: 0 X10E3/UL (ref 0–0.1)
IMM GRANULOCYTES NFR BLD AUTO: 0 %
IMMATURE CELLS  115398: ABNORMAL
LABORATORY COMMENT REPORT: ABNORMAL
LDLC SERPL CALC-MCNC: 139 MG/DL (ref 0–99)
LYMPHOCYTES # BLD AUTO: 2.7 X10E3/UL (ref 0.7–3.1)
LYMPHOCYTES NFR BLD AUTO: 41 %
MCH RBC QN AUTO: 32.6 PG (ref 26.6–33)
MCHC RBC AUTO-ENTMCNC: 34.7 G/DL (ref 31.5–35.7)
MCV RBC AUTO: 94 FL (ref 79–97)
MONOCYTES # BLD AUTO: 0.4 X10E3/UL (ref 0.1–0.9)
MONOCYTES NFR BLD AUTO: 6 %
MORPHOLOGY BLD-IMP: ABNORMAL
NEUTROPHILS # BLD AUTO: 3.2 X10E3/UL (ref 1.4–7)
NEUTROPHILS NFR BLD AUTO: 50 %
NRBC BLD AUTO-RTO: ABNORMAL %
PLATELET # BLD AUTO: 301 X10E3/UL (ref 150–450)
POTASSIUM SERPL-SCNC: 4.3 MMOL/L (ref 3.5–5.2)
PROT SERPL-MCNC: 7.4 G/DL (ref 6–8.5)
RBC # BLD AUTO: 5.03 X10E6/UL (ref 3.77–5.28)
SODIUM SERPL-SCNC: 140 MMOL/L (ref 134–144)
TRIGL SERPL-MCNC: 178 MG/DL (ref 0–149)
TSH SERPL DL<=0.005 MIU/L-ACNC: 1.17 UIU/ML (ref 0.45–4.5)
VLDLC SERPL CALC-MCNC: 36 MG/DL (ref 5–40)
WBC # BLD AUTO: 6.5 X10E3/UL (ref 3.4–10.8)

## 2020-06-19 RX ORDER — NITROFURANTOIN 25; 75 MG/1; MG/1
100 CAPSULE ORAL 2 TIMES DAILY
Qty: 10 CAP | Refills: 0 | Status: SHIPPED | OUTPATIENT
Start: 2020-06-19 | End: 2020-06-24

## 2020-06-19 NOTE — PROGRESS NOTES
Recent urine culture does show pansensitive staph hemolyticus so a prescription for Macrobid has been sent.

## 2020-06-21 LAB
BACTERIA UR CULT: ABNORMAL
BACTERIA UR CULT: ABNORMAL
SIGNIFICANT IND 70042: ABNORMAL
SITE SITE: ABNORMAL
SOURCE SOURCE: ABNORMAL

## 2020-06-22 ENCOUNTER — OFFICE VISIT (OUTPATIENT)
Dept: MEDICAL GROUP | Facility: PHYSICIAN GROUP | Age: 60
End: 2020-06-22
Payer: COMMERCIAL

## 2020-06-22 VITALS
OXYGEN SATURATION: 96 % | HEART RATE: 62 BPM | RESPIRATION RATE: 16 BRPM | WEIGHT: 190.4 LBS | BODY MASS INDEX: 33.73 KG/M2 | DIASTOLIC BLOOD PRESSURE: 92 MMHG | HEIGHT: 63 IN | TEMPERATURE: 98.3 F | SYSTOLIC BLOOD PRESSURE: 160 MMHG

## 2020-06-22 DIAGNOSIS — R03.0 ELEVATED BLOOD PRESSURE READING: ICD-10-CM

## 2020-06-22 DIAGNOSIS — E11.9 TYPE 2 DIABETES MELLITUS WITHOUT COMPLICATION, WITHOUT LONG-TERM CURRENT USE OF INSULIN (HCC): Primary | ICD-10-CM

## 2020-06-22 DIAGNOSIS — R74.8 ELEVATED LIVER ENZYMES: ICD-10-CM

## 2020-06-22 DIAGNOSIS — G47.33 OSA (OBSTRUCTIVE SLEEP APNEA): ICD-10-CM

## 2020-06-22 PROCEDURE — 99214 OFFICE O/P EST MOD 30 MIN: CPT | Performed by: FAMILY MEDICINE

## 2020-06-22 RX ORDER — ATORVASTATIN CALCIUM 10 MG/1
10 TABLET, FILM COATED ORAL DAILY
Qty: 90 TAB | Refills: 0 | Status: SHIPPED | OUTPATIENT
Start: 2020-06-22 | End: 2020-07-23 | Stop reason: SDUPTHER

## 2020-06-22 RX ORDER — METFORMIN HYDROCHLORIDE EXTENDED-RELEASE TABLETS 1000 MG/1
2000 TABLET, FILM COATED, EXTENDED RELEASE ORAL
Qty: 60 TAB | Refills: 0 | Status: SHIPPED | OUTPATIENT
Start: 2020-06-22 | End: 2020-07-23 | Stop reason: SDUPTHER

## 2020-06-22 ASSESSMENT — FIBROSIS 4 INDEX: FIB4 SCORE: 1.23

## 2020-06-22 NOTE — ASSESSMENT & PLAN NOTE
This is a chronic condition.  She has had elevated liver enzymes since 2012.  Labs done recently did show continued elevation of AST and ALT.

## 2020-06-22 NOTE — PROGRESS NOTES
Subjective:     CC: f/u labs    HPI:   Mable presents today with     Type 2 diabetes mellitus (HCC)  This is a new condition.  Current medications: none  Last A1c: 7.7% (6/2020)  Last Microalb/Cr ratio: due  Last diabetic foot exam: due  Last retinal eye exam: 2 years ago  ACEi/ARB: to be determined  Statin: start today  Aspirin: recommended  Concomitant HTN: yes - not at goal  Nightly foot checks: no - provided education      Elevated liver enzymes  This is a chronic condition.  She has had elevated liver enzymes since 2012.  Labs done recently did show continued elevation of AST and ALT.      Past Medical History:   Diagnosis Date   • Chickenpox    • Hirsutism 2012    elevated testosterone   • Hyperprolactinemia (HCC) 2012    minimal = 32   • Melanoma (HCC)    • Mumps    • Pituitary abnormality (HCC)     2mm cyst or microadenoma   • Tonsillitis        Social History     Tobacco Use   • Smoking status: Never Smoker   • Smokeless tobacco: Never Used   Substance Use Topics   • Alcohol use: Yes     Alcohol/week: 0.0 oz     Comment: 2 on the weekends   • Drug use: No       Current Outpatient Medications Ordered in Epic   Medication Sig Dispense Refill   • metFORMIN ER 1000 MG TABLET SR 24 HR Take 2 Tabs by mouth with dinner. 60 Tab 0   • atorvastatin (LIPITOR) 10 MG Tab Take 1 Tab by mouth every day. 90 Tab 0   • nitrofurantoin (MACROBID) 100 MG Cap Take 1 Cap by mouth 2 times a day for 5 days. 10 Cap 0   • fluticasone (FLONASE) 50 MCG/ACT nasal spray Spray 1 Spray in nose every day. 16 g 0   • Cholecalciferol (VITAMIN D PO) Take 2,000 Int'l Units by mouth every day.     • Multiple Vitamin (MULTI-VITAMIN DAILY PO) Take  by mouth.     • colestipol (COLESTID) 1 GM Tab Take 1 g by mouth every day.       Current Facility-Administered Medications Ordered in Epic   Medication Dose Route Frequency Provider Last Rate Last Dose   • eucalyptus/peppermint oil (PONARIS NASAL) nasal emollient 0.5 mL  10 Drop Nasal Q6HRS PRN  "Kranthi Robbins P.A.-C.           Allergies:  Codeine and Pcn [penicillins]    Health Maintenance: Completed    ROS:  Gen: no fevers/chills  Pulm: no sob  CV: no chest pain    Objective:     Exam:  /92 (BP Location: Left arm, Patient Position: Sitting, BP Cuff Size: Large adult)   Pulse 62   Temp 36.8 °C (98.3 °F) (Temporal)   Resp 16   Ht 1.6 m (5' 3\")   Wt 86.4 kg (190 lb 6.4 oz)   SpO2 96%   BMI 33.73 kg/m²  Body mass index is 33.73 kg/m².    Gen: Alert and oriented, No apparent distress.  Neck: Neck is supple without lymphadenopathy.  Lungs: Normal effort, CTA bilaterally, no wheezes, rhonchi, or rales  CV: Regular rate and rhythm. No murmurs, rubs, or gallops.  Ext: No clubbing, cyanosis, edema.  Diabetic foot exam: No lesions or calluses noted. 2+ pedal pulses. Sensation intact with 10 out of 10 on monofilament test.    Assessment & Plan:     59 y.o. female with the following -     1. Type 2 diabetes mellitus without complication, without long-term current use of insulin (HCC)  This is a new diagnosis.  Labs done recently shows an elevated hemoglobin A1c of 7.7%.  I provided a lot of education about diabetes and its possible consequences today.  We are going to start metformin.  However, she already get some diarrhea after having a cholecystectomy so were to give the extended release version.  Additionally, we will start a statin as her cholesterol levels were elevated and because diabetes acts as an independent risk factor for cardiovascular disease.  Diabetic foot exam performed today and normal.  Her last eye exam was 2 years ago and I encouraged her to make an appointment with her eye doctor.  -Start metformin ER 1000 mg daily, can increase to 2000 mg after 2 weeks  -Start atorvastatin 10 mg daily  - MICROALBUMIN CREAT RATIO URINE; Future  - Diabetic Monofilament LE Exam    2. Elevated liver enzymes  This is a chronic condition, unchanged.  She has had elevated liver enzymes since 2012.  " Labs in June, 2020 shows continued elevation but stable.  Likely related to fatty liver disease but she does drink some alcohol, a couple drinks per week.  -Lifestyle changes and limit alcohol    3. MANSIH (obstructive sleep apnea)  This is a chronic condition, uncontrolled.  She has a history of sleep apnea but is not currently getting it treated.  She cannot recall today why she stopped using CPAP.  She does have elevated hemoglobin and hematocrit on labs and I suspect it secondary to her untreated MANISH.  We also did discuss that untreated MANISH can lead to elevated blood pressures, worsening sugar control, and worsening weight control.  She is interested in discussing treatment options with sleep medicine to see if there is anything else she can use.  - REFERRAL TO SLEEP STUDIES    4. Elevated blood pressure reading  This is a new condition.  Blood pressure is fairly elevated in the exam today.  She does not have a history of hypertension is on no blood pressure medications.  She does have a home blood pressure cuff so we will have her check at home before her next visit to see if the blood pressures are elevated at home before starting a medication.      Return in about 4 weeks (around 7/20/2020) for f/u med.    Please note that this dictation was created using voice recognition software. I have made every reasonable attempt to correct obvious errors, but I expect that there are errors of grammar and possibly content that I did not discover before finalizing the note.

## 2020-06-22 NOTE — ASSESSMENT & PLAN NOTE
This is a new condition.  Current medications: none  Last A1c: 7.7% (6/2020)  Last Microalb/Cr ratio: due  Last diabetic foot exam: due  Last retinal eye exam: 2 years ago  ACEi/ARB: to be determined  Statin: start today  Aspirin: recommended  Concomitant HTN: yes - not at goal  Nightly foot checks: no - provided education

## 2020-06-22 NOTE — PATIENT INSTRUCTIONS
For the diabetes:  - Checking another lab, urine test for the kidneys  - start metformin: 1 pill a day for 2 weeks, then increase to 2 pills a day  - start atorvastatin: 1 pill a day  - check your feet every day for surprise wounds  - make an eye appointment, we should be doing that yearly    For your blood pressure:  - check your blood pressure every day and put it in a log  - pick a different time each day so we can see how it varies throughout the day  - when you check your blood pressure: make sure you sit quietly for 5-10 minutes beforehand, keep both feet flat on the ground, and make sure you use an arm cuff at heart height  - Our goal is under 140/90 (normal is under 120/80)    Lifestyle changes  - moderate aerobic exercise 150 minutes/week  - you can talk but not sing (fat burning zone)  - high intensity interval training  - limit sugars and carbohydrates  - Mediterranean Diet and DASH diet

## 2020-07-21 LAB
ALBUMIN/CREAT UR: 68 MG/G CREAT (ref 0–29)
CREAT UR-MCNC: 172.1 MG/DL
MICROALBUMIN UR-MCNC: 117.7 UG/ML

## 2020-07-23 ENCOUNTER — OFFICE VISIT (OUTPATIENT)
Dept: MEDICAL GROUP | Facility: PHYSICIAN GROUP | Age: 60
End: 2020-07-23
Payer: COMMERCIAL

## 2020-07-23 VITALS
WEIGHT: 183 LBS | DIASTOLIC BLOOD PRESSURE: 80 MMHG | OXYGEN SATURATION: 98 % | TEMPERATURE: 98.1 F | RESPIRATION RATE: 16 BRPM | HEIGHT: 63 IN | BODY MASS INDEX: 32.43 KG/M2 | SYSTOLIC BLOOD PRESSURE: 140 MMHG | HEART RATE: 54 BPM

## 2020-07-23 DIAGNOSIS — D58.2 ELEVATED HEMOGLOBIN (HCC): ICD-10-CM

## 2020-07-23 DIAGNOSIS — N95.1 MENOPAUSAL SYMPTOMS: ICD-10-CM

## 2020-07-23 DIAGNOSIS — R74.8 ELEVATED LIVER ENZYMES: ICD-10-CM

## 2020-07-23 DIAGNOSIS — E66.9 OBESITY (BMI 30-39.9): ICD-10-CM

## 2020-07-23 DIAGNOSIS — E11.9 TYPE 2 DIABETES MELLITUS WITHOUT COMPLICATION, WITHOUT LONG-TERM CURRENT USE OF INSULIN (HCC): Primary | ICD-10-CM

## 2020-07-23 DIAGNOSIS — Z01.84 IMMUNITY STATUS TESTING: ICD-10-CM

## 2020-07-23 PROCEDURE — 99214 OFFICE O/P EST MOD 30 MIN: CPT | Performed by: FAMILY MEDICINE

## 2020-07-23 RX ORDER — LISINOPRIL 5 MG/1
5 TABLET ORAL DAILY
Qty: 30 TAB | Refills: 0 | Status: SHIPPED | OUTPATIENT
Start: 2020-07-23 | End: 2020-08-20

## 2020-07-23 RX ORDER — ATORVASTATIN CALCIUM 10 MG/1
10 TABLET, FILM COATED ORAL DAILY
Qty: 30 TAB | Refills: 3 | Status: SHIPPED | OUTPATIENT
Start: 2020-07-23 | End: 2021-02-16

## 2020-07-23 RX ORDER — METFORMIN HYDROCHLORIDE EXTENDED-RELEASE TABLETS 1000 MG/1
2000 TABLET, FILM COATED, EXTENDED RELEASE ORAL
Qty: 60 TAB | Refills: 0 | Status: SHIPPED | OUTPATIENT
Start: 2020-07-23 | End: 2020-10-22

## 2020-07-23 ASSESSMENT — FIBROSIS 4 INDEX: FIB4 SCORE: 1.23

## 2020-07-23 NOTE — ASSESSMENT & PLAN NOTE
This is a chronic condition. She will not get hot flashes but will fluctuate between cold and hot temperature sensation. She is starting to sleep better. She will get sweating but no heavy.

## 2020-07-23 NOTE — PROGRESS NOTES
Subjective:     CC: f/u DM, new meds    HPI:   Mable presents today with     Type 2 diabetes mellitus (HCC)  This is a chronic condition. Diagnosed 6/2020.  Current medications: metformin ER 1000 mg daily (getting diarrhea once daily)  Last A1c: 7.7% (6/2020)  Last Microalb/Cr ratio: 68 (7/2020)  Last diabetic foot exam: due 6/2021  Last retinal eye exam: 2 years ago  ACEi/ARB: need to start  Statin: atorvastatin 10 mg  Aspirin: recommended  Concomitant HTN: yes - at goal  Nightly foot checks: yes      Obesity (BMI 30-39.9)  This is a chronic condition.  Max weight: 195 (9/2017)  Current weight: 183 lbs  Weight change: -7 lbs since 6/2020  Diet: hybrid keto-mediterranean, smaller portions, stopped red meat  Exercise: walking every day      Menopausal symptoms  This is a chronic condition. She will not get hot flashes but will fluctuate between cold and hot temperature sensation. She is starting to sleep better. She will get sweating but no heavy.       Past Medical History:   Diagnosis Date   • Chickenpox    • Hirsutism 2012    elevated testosterone   • Hyperprolactinemia (HCC) 2012    minimal = 32   • Melanoma (HCC)    • Mumps    • Pituitary abnormality (HCC)     2mm cyst or microadenoma   • Tonsillitis        Social History     Tobacco Use   • Smoking status: Never Smoker   • Smokeless tobacco: Never Used   Substance Use Topics   • Alcohol use: Yes     Alcohol/week: 0.0 oz     Comment: 2 on the weekends   • Drug use: No       Current Outpatient Medications Ordered in Epic   Medication Sig Dispense Refill   • metFORMIN ER 1000 MG TABLET SR 24 HR Take 2 Tabs by mouth with dinner. 60 Tab 0   • lisinopril (PRINIVIL) 5 MG Tab Take 1 Tab by mouth every day. 30 Tab 0   • atorvastatin (LIPITOR) 10 MG Tab Take 1 Tab by mouth every day. 30 Tab 3   • fluticasone (FLONASE) 50 MCG/ACT nasal spray Spray 1 Spray in nose every day. 16 g 0   • Cholecalciferol (VITAMIN D PO) Take 2,000 Int'l Units by mouth every day.     • Multiple  "Vitamin (MULTI-VITAMIN DAILY PO) Take  by mouth.     • colestipol (COLESTID) 1 GM Tab Take 1 g by mouth every day.       Current Facility-Administered Medications Ordered in Epic   Medication Dose Route Frequency Provider Last Rate Last Dose   • eucalyptus/peppermint oil (PONARIS NASAL) nasal emollient 0.5 mL  10 Drop Nasal Q6HRS PRN Kranthi Robbins P.A.-C.           Allergies:  Codeine and Pcn [penicillins]    Health Maintenance: address at next visit    ROS:  Gen: no fevers/chills  Pulm: no sob  CV: no chest pain    Objective:     Exam:  /80   Pulse (!) 54   Temp 36.7 °C (98.1 °F)   Resp 16   Ht 1.6 m (5' 3\")   Wt 83 kg (183 lb)   SpO2 98%   BMI 32.42 kg/m²  Body mass index is 32.42 kg/m².    Gen: Alert and oriented, No apparent distress.  Neck: Neck is supple without lymphadenopathy.  Lungs: Normal effort, CTA bilaterally, no wheezes, rhonchi, or rales  CV: Regular rate and rhythm. No murmurs, rubs, or gallops.  Ext: No clubbing, cyanosis, edema.    Assessment & Plan:     59 y.o. female with the following -     1. Type 2 diabetes mellitus without complication, without long-term current use of insulin (HCC)  This is an acute condition, diagnosed in June, 2020.  We started her on metformin.  Today she reports that she gets 1 episode of diarrhea daily but does not feel her diarrhea is gotten worse on the metformin she did have diarrhea before we started the medication.  She is also tolerating the atorvastatin well.  She states that she had her eye exam yesterday we are going to request records.  -Increase metformin ER to 2000 g daily  -Continue atorvastatin 10 mg daily  -Start lisinopril 5 mg since her microalbumin to creatinine ratio was elevated  - Comp Metabolic Panel; Future  - HEMOGLOBIN A1C; Future  - Lipid Profile; Future    2. Obesity (BMI 30-39.9)  This is a chronic condition, improved.  She has lost 7 pounds since June, 2020.  She has made a lot of little changes since her last visit.  She " is now trying to walk every day.  She is eating a diet that is a hybrid of keto Mediterranean, cutting out red meat, and decreasing portion sizes.  I encouraged her healthy habits.    3. Elevated liver enzymes  She has had some elevated liver enzymes and will be due for labs to check on this in September, 2020 which have been ordered.    4. Elevated hemoglobin (HCC)  This is a chronic condition, stable.  Were going to check a CBC every 3 months to monitor.  It is likely secondary to her untreated sleep apnea.  - CBC WITH DIFFERENTIAL; Future    5. Immunity status testing  She is interested to see if she has the COVID-19 antibody.  - SARS CoV-2 Ab, Total; Future    6. Menopausal symptoms  This is a chronic condition, stable.  She reports that she does not get actual hot flashes but she will feel hot and cold periodically.  She also sweats easier than she used to but reports is not very heavy.  She states her sleep is starting to get better.  She wanted to discuss potential options and she would like to avoid hormonal replacement therapy.  -Herbal supplements discussed    Return in about 4 weeks (around 8/20/2020) for f/u DM, meds.    Please note that this dictation was created using voice recognition software. I have made every reasonable attempt to correct obvious errors, but I expect that there are errors of grammar and possibly content that I did not discover before finalizing the note.

## 2020-07-23 NOTE — PATIENT INSTRUCTIONS
Changes today:  - take 2 pills of metformin at dinner (total 2000 mg)  - take 1 pill of lisinopril daily (for the kidney protection and blood pressure)  - I've refilled the atorvastatin so you can keep taking that once per day  - I've ordered labs to be done at or after 9/15/2020    For menopause:  - Black cohosh  - Estroven = black cohosh + soy flavinoids  - Amberen  - Evening primrose oil

## 2020-07-23 NOTE — ASSESSMENT & PLAN NOTE
This is a chronic condition.  Max weight: 195 (9/2017)  Current weight: 183 lbs  Weight change: -7 lbs since 6/2020  Diet: hybrid keto-mediterranean, smaller portions, stopped red meat  Exercise: walking every day

## 2020-07-23 NOTE — LETTER
[facility]  Sherine Cleary M.D.  [address]  [place] [address]  Fax: [phone]   Authorization for Release/Disclosure of   Protected Health Information   Name: ADAN DOVER : 1960 SSN: [id number]   Address: [address] [place] [address] [place] [address] Phone:    [phone] (home)    I authorize the entity listed below to release/disclose the PHI below to:   [facility]/Sherine Cleary M.D. and Sherine Cleary M.D.   Provider or Entity Name:  [facility]   Address   City, State, [facility]   Phone:      Fax:     Reason for request: continuity of care   Information to be released:    [  ] LAST COLONOSCOPY,  including any PATH REPORT and follow-up  [  ] LAST FIT/COLOGUARD RESULT [  ] LAST DEXA  [  ] LAST MAMMOGRAM  [  ] LAST PAP  [  ] LAST LABS [X] RETINA EXAM REPORT  [  ] IMMUNIZATION RECORDS  [  ] Release all info      [  ] Check here and initial the line next to each item to release ALL health information INCLUDING  _____ Care and treatment for drug and / or alcohol abuse  _____ HIV testing, infection status, or AIDS  _____ Genetic Testing    DATES OF SERVICE OR TIME PERIOD TO BE DISCLOSED: _____________  I understand and acknowledge that:  * This Authorization may be revoked at any time by you in writing, except if your health information has already been used or disclosed.  * Your health information that will be used or disclosed as a result of you signing this authorization could be re-disclosed by the recipient. If this occurs, your re-disclosed health information may no longer be protected by State or Federal laws.  * You may refuse to sign this Authorization. Your refusal will not affect your ability to obtain treatment.  * This Authorization becomes effective upon signing and will  on (date) __________.      If no date is indicated, this Authorization will  one (1) year from the signature date.    Name: Adan Dover    Signature:   Date:      7/23/2020       PLEASE FAX REQUESTED RECORDS BACK TO: [phone]

## 2020-07-23 NOTE — ASSESSMENT & PLAN NOTE
This is a chronic condition. Diagnosed 6/2020.  Current medications: metformin ER 1000 mg daily (getting diarrhea once daily)  Last A1c: 7.7% (6/2020)  Last Microalb/Cr ratio: 68 (7/2020)  Last diabetic foot exam: due 6/2021  Last retinal eye exam: 2 years ago  ACEi/ARB: need to start  Statin: atorvastatin 10 mg  Aspirin: recommended  Concomitant HTN: yes - at goal  Nightly foot checks: yes

## 2020-08-20 RX ORDER — LISINOPRIL 5 MG/1
TABLET ORAL
Qty: 90 TAB | Refills: 0 | Status: SHIPPED | OUTPATIENT
Start: 2020-08-20 | End: 2020-11-30

## 2020-08-24 ENCOUNTER — APPOINTMENT (OUTPATIENT)
Dept: MEDICAL GROUP | Facility: PHYSICIAN GROUP | Age: 60
End: 2020-08-24
Payer: COMMERCIAL

## 2020-09-11 LAB
ALBUMIN SERPL-MCNC: 5 G/DL (ref 3.8–4.8)
ALBUMIN/GLOB SERPL: 2.1 {RATIO} (ref 1.2–2.2)
ALP SERPL-CCNC: 68 IU/L (ref 39–117)
ALT SERPL-CCNC: 44 IU/L (ref 0–32)
AST SERPL-CCNC: 38 IU/L (ref 0–40)
BASOPHILS # BLD AUTO: 0.1 X10E3/UL (ref 0–0.2)
BASOPHILS NFR BLD AUTO: 1 %
BILIRUB SERPL-MCNC: 0.7 MG/DL (ref 0–1.2)
BUN SERPL-MCNC: 13 MG/DL (ref 8–27)
BUN/CREAT SERPL: 17 (ref 12–28)
CALCIUM SERPL-MCNC: 10.2 MG/DL (ref 8.7–10.3)
CHLORIDE SERPL-SCNC: 102 MMOL/L (ref 96–106)
CHOLEST SERPL-MCNC: 150 MG/DL (ref 100–199)
CO2 SERPL-SCNC: 24 MMOL/L (ref 20–29)
CREAT SERPL-MCNC: 0.75 MG/DL (ref 0.57–1)
EOSINOPHIL # BLD AUTO: 0.3 X10E3/UL (ref 0–0.4)
EOSINOPHIL NFR BLD AUTO: 4 %
ERYTHROCYTE [DISTWIDTH] IN BLOOD BY AUTOMATED COUNT: 11.8 % (ref 11.7–15.4)
GLOBULIN SER CALC-MCNC: 2.4 G/DL (ref 1.5–4.5)
GLUCOSE SERPL-MCNC: 159 MG/DL (ref 65–99)
HBA1C MFR BLD: 5.8 % (ref 4.8–5.6)
HCT VFR BLD AUTO: 44.8 % (ref 34–46.6)
HDLC SERPL-MCNC: 55 MG/DL
HGB BLD-MCNC: 15.6 G/DL (ref 11.1–15.9)
IMM GRANULOCYTES # BLD AUTO: 0 X10E3/UL (ref 0–0.1)
IMM GRANULOCYTES NFR BLD AUTO: 0 %
IMMATURE CELLS  115398: ABNORMAL
LABORATORY COMMENT REPORT: NORMAL
LDLC SERPL CALC-MCNC: 70 MG/DL (ref 0–99)
LYMPHOCYTES # BLD AUTO: 2.4 X10E3/UL (ref 0.7–3.1)
LYMPHOCYTES NFR BLD AUTO: 37 %
MCH RBC QN AUTO: 33.1 PG (ref 26.6–33)
MCHC RBC AUTO-ENTMCNC: 34.8 G/DL (ref 31.5–35.7)
MCV RBC AUTO: 95 FL (ref 79–97)
MONOCYTES # BLD AUTO: 0.4 X10E3/UL (ref 0.1–0.9)
MONOCYTES NFR BLD AUTO: 6 %
MORPHOLOGY BLD-IMP: ABNORMAL
NEUTROPHILS # BLD AUTO: 3.3 X10E3/UL (ref 1.4–7)
NEUTROPHILS NFR BLD AUTO: 52 %
NRBC BLD AUTO-RTO: ABNORMAL %
PLATELET # BLD AUTO: 300 X10E3/UL (ref 150–450)
POTASSIUM SERPL-SCNC: 3.9 MMOL/L (ref 3.5–5.2)
PROT SERPL-MCNC: 7.4 G/DL (ref 6–8.5)
RBC # BLD AUTO: 4.71 X10E6/UL (ref 3.77–5.28)
SARS-COV-2 IGG SERPL QL IA: NEGATIVE
SODIUM SERPL-SCNC: 141 MMOL/L (ref 134–144)
TRIGL SERPL-MCNC: 149 MG/DL (ref 0–149)
VLDLC SERPL CALC-MCNC: 25 MG/DL (ref 5–40)
WBC # BLD AUTO: 6.4 X10E3/UL (ref 3.4–10.8)

## 2020-09-24 ENCOUNTER — OFFICE VISIT (OUTPATIENT)
Dept: MEDICAL GROUP | Facility: PHYSICIAN GROUP | Age: 60
End: 2020-09-24
Payer: COMMERCIAL

## 2020-09-24 VITALS
HEART RATE: 57 BPM | BODY MASS INDEX: 31.36 KG/M2 | WEIGHT: 177 LBS | SYSTOLIC BLOOD PRESSURE: 140 MMHG | TEMPERATURE: 97.6 F | OXYGEN SATURATION: 96 % | RESPIRATION RATE: 16 BRPM | HEIGHT: 63 IN | DIASTOLIC BLOOD PRESSURE: 80 MMHG

## 2020-09-24 DIAGNOSIS — E78.5 HYPERLIPIDEMIA, UNSPECIFIED HYPERLIPIDEMIA TYPE: ICD-10-CM

## 2020-09-24 DIAGNOSIS — E11.9 TYPE 2 DIABETES MELLITUS WITHOUT COMPLICATION, WITHOUT LONG-TERM CURRENT USE OF INSULIN (HCC): Primary | ICD-10-CM

## 2020-09-24 DIAGNOSIS — Z23 NEED FOR VACCINATION: ICD-10-CM

## 2020-09-24 PROCEDURE — 99213 OFFICE O/P EST LOW 20 MIN: CPT | Mod: 25 | Performed by: FAMILY MEDICINE

## 2020-09-24 PROCEDURE — 90732 PPSV23 VACC 2 YRS+ SUBQ/IM: CPT | Performed by: FAMILY MEDICINE

## 2020-09-24 PROCEDURE — 90471 IMMUNIZATION ADMIN: CPT | Performed by: FAMILY MEDICINE

## 2020-09-24 ASSESSMENT — FIBROSIS 4 INDEX: FIB4 SCORE: 1.16

## 2020-09-24 NOTE — LETTER
[facility]  Sherine Cleary M.D.  [address]  [place] [address]  Fax: [phone]   Authorization for Release/Disclosure of   Protected Health Information   Name: ADAN DOVER : 1960 SSN: [id number]   Address: [address] [place] [address] [place] [address] Phone:    [phone] (home)    I authorize the entity listed below to release/disclose the PHI below to:   [facility]/Sherine Cleary M.D. and Sherine Cleary M.D.   Provider or Entity Name:  [facility]   City, State, [facility]   Phone:      Fax:     Reason for request: continuity of care   Information to be released:    [  ] LAST COLONOSCOPY,  including any PATH REPORT and follow-up  [  ] LAST FIT/COLOGUARD RESULT [  ] LAST DEXA  [  ] LAST MAMMOGRAM  [  ] LAST PAP  [  ] LAST LABS [X] RETINA EXAM REPORT  [  ] IMMUNIZATION RECORDS  [  ] Release all info      [  ] Check here and initial the line next to each item to release ALL health information INCLUDING  _____ Care and treatment for drug and / or alcohol abuse  _____ HIV testing, infection status, or AIDS  _____ Genetic Testing    DATES OF SERVICE OR TIME PERIOD TO BE DISCLOSED: _____________  I understand and acknowledge that:  * This Authorization may be revoked at any time by you in writing, except if your health information has already been used or disclosed.  * Your health information that will be used or disclosed as a result of you signing this authorization could be re-disclosed by the recipient. If this occurs, your re-disclosed health information may no longer be protected by State or Federal laws.  * You may refuse to sign this Authorization. Your refusal will not affect your ability to obtain treatment.  * This Authorization becomes effective upon signing and will  on (date) __________.      If no date is indicated, this Authorization will  one (1) year from the signature date.    Name: Adan Dover    Signature:   Date:      9/24/2020       PLEASE FAX REQUESTED RECORDS BACK TO: [phone]

## 2020-09-24 NOTE — PROGRESS NOTES
"Subjective:     CC: f/u labs    HPI:   Mable presents today with     Type 2 diabetes mellitus (HCC)  This is a chronic condition. Diagnosed 6/2020.  At her last appointment we increased her metformin but she only did it for a few days because her diarrhea got a bunch worse.  She has been diligently working on exercising regularly and eating a healthier diet.  She is also cut down her alcohol intake to \"almost nothing\".  Current medications: metformin ER 1000 mg daily (getting diarrhea once daily)   Last A1c: 58% (9/2020) 7.7% (6/2020)  Last Microalb/Cr ratio: 68 (7/2020)  Last diabetic foot exam: 6/2020  Last retinal eye exam: 7/2020  ACEi/ARB: lisinopril 5 mg  Statin: atorvastatin 10 mg  Aspirin: recommended  Concomitant HTN: yes - at goal  Nightly foot checks: yes      Hyperlipidemia  This is a chronic condition.  She is on atorvastatin for primary prevention.  Recent labs showed all of her cholesterol values normalized on the medication.      Past Medical History:   Diagnosis Date   • Chickenpox    • Hirsutism 2012    elevated testosterone   • Hyperprolactinemia (HCC) 2012    minimal = 32   • Melanoma (HCC)    • Mumps    • Pituitary abnormality (HCC)     2mm cyst or microadenoma   • Tonsillitis        Social History     Tobacco Use   • Smoking status: Never Smoker   • Smokeless tobacco: Never Used   Substance Use Topics   • Alcohol use: Yes     Alcohol/week: 0.0 oz     Comment: 2 on the weekends   • Drug use: No       Current Outpatient Medications Ordered in Epic   Medication Sig Dispense Refill   • lisinopril (PRINIVIL) 5 MG Tab Take 1 tablet by mouth once daily 90 Tab 0   • metFORMIN ER 1000 MG TABLET SR 24 HR Take 2 Tabs by mouth with dinner. (Patient taking differently: Take 1,000 mg by mouth with dinner.) 60 Tab 0   • atorvastatin (LIPITOR) 10 MG Tab Take 1 Tab by mouth every day. 30 Tab 3   • fluticasone (FLONASE) 50 MCG/ACT nasal spray Spray 1 Spray in nose every day. 16 g 0   • Cholecalciferol (VITAMIN D " "PO) Take 2,000 Int'l Units by mouth every day.     • Multiple Vitamin (MULTI-VITAMIN DAILY PO) Take  by mouth.     • colestipol (COLESTID) 1 GM Tab Take 1 g by mouth every day.       Current Facility-Administered Medications Ordered in Epic   Medication Dose Route Frequency Provider Last Rate Last Dose   • eucalyptus/peppermint oil (PONARIS NASAL) nasal emollient 0.5 mL  10 Drop Nasal Q6HRS PRN Kranthi Robbins P.A.-C.           Allergies:  Codeine and Pcn [penicillins]    Health Maintenance: Requesting eye records    ROS:  Gen: no fevers/chills  Pulm: no sob  CV: no chest pain    Objective:     Exam:  /80 (BP Location: Left arm, Patient Position: Sitting, BP Cuff Size: Adult)   Pulse (!) 57   Temp 36.4 °C (97.6 °F)   Resp 16   Ht 1.6 m (5' 3\")   Wt 80.3 kg (177 lb)   SpO2 96%   BMI 31.35 kg/m²  Body mass index is 31.35 kg/m².    Gen: Alert and oriented, No apparent distress.  Neck: Neck is supple without lymphadenopathy.  Lungs: Normal effort, CTA bilaterally, no wheezes, rhonchi, or rales  CV: Regular rate and rhythm. No murmurs, rubs, or gallops.  Ext: No clubbing, cyanosis, edema.    Assessment & Plan:     60 y.o. female with the following -     1. Type 2 diabetes mellitus without complication, without long-term current use of insulin (HCC)  This is a chronic condition, controlled.  Her hemoglobin A1c is down to 5.8% from 7.7%.  She did not increase the dose of her metformin as it caused worse diarrhea but has been diligently working on healthy lifestyle and weight loss.  She has lost almost 10% of her body weight since June, 2020.  She is also on an ACE inhibitor and statin, tolerating both.  -Continue metformin ER 1000 mg daily  -Recheck A1c in 6 months  -If A1c continues to be under 6%, decrease metformin dose    2. Hyperlipidemia, unspecified hyperlipidemia type  This is a chronic condition, improved.  We started her on a statin for primary prevention and recent labs did show her cholesterol " values all normalized.  -Continue atorvastatin 10 mg daily    3. Need for vaccination  - PneumoVax PPV23 =>2yo    Return in about 6 months (around 4/5/2021) for f/u DM.    Please note that this dictation was created using voice recognition software. I have made every reasonable attempt to correct obvious errors, but I expect that there are errors of grammar and possibly content that I did not discover before finalizing the note.

## 2020-09-24 NOTE — ASSESSMENT & PLAN NOTE
This is a chronic condition.  She is on atorvastatin for primary prevention.  Recent labs showed all of her cholesterol values normalized on the medication.

## 2020-09-24 NOTE — ASSESSMENT & PLAN NOTE
"This is a chronic condition. Diagnosed 6/2020.  At her last appointment we increased her metformin but she only did it for a few days because her diarrhea got a bunch worse.  She has been diligently working on exercising regularly and eating a healthier diet.  She is also cut down her alcohol intake to \"almost nothing\".  Current medications: metformin ER 1000 mg daily (getting diarrhea once daily)   Last A1c: 5.8% (9/2020) 7.7% (6/2020)  Last Microalb/Cr ratio: 68 (7/2020)  Last diabetic foot exam: 6/2020  Last retinal eye exam: 7/2020  ACEi/ARB: lisinopril 5 mg  Statin: atorvastatin 10 mg  Aspirin: recommended  Concomitant HTN: yes - at goal  Nightly foot checks: yes    "

## 2020-09-25 ENCOUNTER — NURSE TRIAGE (OUTPATIENT)
Dept: HEALTH INFORMATION MANAGEMENT | Facility: OTHER | Age: 60
End: 2020-09-25

## 2020-09-25 DIAGNOSIS — Z11.59 ENCOUNTER FOR SCREENING FOR OTHER VIRAL DISEASES: ICD-10-CM

## 2020-09-25 NOTE — TELEPHONE ENCOUNTER
Phone Number Called: [phone] (home)     Call outcome: Did not leave a detailed message. Requested patient to call back.    Message: LVM to call back.       1. Any symptoms?  Onset date of symptoms?   2. Is this their 1st Covid test?  3. Do they work in a healthcare setting?  4. Do they live in a congregate care setting or assisted living?  5. Pregnant?

## 2020-09-25 NOTE — TELEPHONE ENCOUNTER
Phone Number Called: [phone] (home)     Call outcome: Spoke to patient regarding message below.    Message: Spoke with patient and let them know Sherine Cleary M.D.'s message.

## 2020-09-25 NOTE — TELEPHONE ENCOUNTER
1. Caller Name: Mable                 Call Back Number: [phone]   [facility] PCP or Specialty Provider: Lauren Cleary        2.  In the last two weeks, has the patient had any new or worsening symptoms (not explained by alternative diagnosis)? No.    3.  Does patient have any comoribidities? None     4.  Has the patient traveled in the last 14 days OR had any known contact with someone who is suspected or confirmed to have COVID-19?  Yes, Daughter tested positive 09/19/2020 with symptoms of Covid. Nasal congestion and headache, body aches, fatigue, diarrhea, night sweats.    5. POTENTIAL PUI (LOW): Advised to perform self care, monitor for worsening symptoms and to call back in 3 days if no improvement. Instructed to self isolate and contact [facility] at [phone]        Note routed to [facility] Provider: Provider action needed: Patient is requesting lab order for Covid 19 testing please          .     Reason for Disposition  • [1] COVID-19 EXPOSURE (Close Contact) AND [2] within last 14 days AND [3] NO cough, fever, or breathing difficulty    Additional Information  • Negative: SEVERE difficulty breathing (e.g., struggling for each breath, speak in single words, bluish lips)  • Negative: Sounds like a life-threatening emergency to the triager  • Negative: [1] Adult has symptoms of COVID-19 (fever, cough, or SOB) AND [2] lab test positive  • Negative: [1] Adult has symptoms of COVID-19 (fever, cough or SOB) AND [2] major community spread where patient lives AND [3] testing not being done for mild symptoms  • Negative: [1] Difficulty breathing (shortness of breath) occurs AND [2] onset > 14 days after COVID-19 EXPOSURE (Close Contact) AND [3] no major community spread  • Negative: [1] Cough occurs AND [2] onset > 14 days after COVID-19 EXPOSURE AND [3] no major community spread  • Negative: [1] Common cold symptoms AND [2] onset > 14 days after COVID-19 EXPOSURE AND [3] no major community spread  • Negative: [1]  "Difficulty breathing occurs AND [2] within 14 days of COVID-19 EXPOSURE (Close Contact)  • Negative: Patient sounds very sick or weak to the triager  • Negative: [1] Fever (or feeling feverish) OR cough AND [2] within 14 Days of COVID-19 EXPOSURE (Close Contact)  • Negative: [1] Fever (or feeling feverish) OR cough occurs AND [2] within 14 days of travel from another country (international travel)  • Negative: [1] Fever (or feeling feverish) OR cough occurs AND [2] within 14 days of travel from a city or area with major community spread  • Negative: [1] Fever (or feeling feverish) OR cough occurs AND [2] living in area with major community spread AND [3] testing being done in the community for symptoms  • Negative: [1] Mild body aches, chills, diarrhea, headache, runny nose, or sore throat AND [2] within 14 days of COVID-19 EXPOSURE  • Negative: [1] COVID-19 EXPOSURE within last 14 days AND [2] NO cough, fever, or breathing difficulty AND [3] exposed person is a healthcare worker who was NOT using all recommended personal protective equipment (i.e., a respirator-N95 mask, eye protection, gloves, and gown)    Answer Assessment - Initial Assessment Questions  1. CLOSE CONTACT: \"Who is the person with the confirmed or suspected COVID-19 infection that you were exposed to?\"      Daughter in same home but isolated to a different part of the home  2. PLACE of CONTACT: \"Where were you when you were exposed to COVID-19?\" (e.g., home, school, medical waiting room; which city?)      Home around daughter who is positive   3. TYPE of CONTACT: \"How much contact was there?\" (e.g., sitting next to, live in same house, work in same office, same building)      Lives in same home  4. DURATION of CONTACT: \"How long were you in contact with the COVID-19 patient?\" (e.g., a few seconds, passed by person, a few minutes, live with the patient)      Not at all  5. DATE of CONTACT: \"When did you have contact with a COVID-19 patient?\" (e.g., " "how many days ago)      Tested positive for covid 9/19/2020  6. TRAVEL: \"Have you traveled out of the country recently?\" If so, \"When and where?\"      * Also ask about out-of-state travel, since the [facility] has identified some high risk cities for community spread in the .      * Note: Travel becomes less relevant if there is widespread community transmission where the patient lives.      no  7. COMMUNITY SPREAD: \"Are there lots of cases of COVID-19 (community spread) where you live?\" (See public health department website, if unsure)    * MAJOR community spread: high number of cases; numbers of cases are increasing; many people hospitalized.    * MINOR community spread: low number of cases; not increasing; few or no people hospitalized      no  8. SYMPTOMS: \"Do you have any symptoms?\" (e.g., fever, cough, breathing difficulty)      no  9. PREGNANCY OR POSTPARTUM: \"Is there any chance you are pregnant?\" \"When was your last menstrual period?\" \"Did you deliver in the last 2 weeks?\"      no  10. HIGH RISK: \"Do you have any heart or lung problems? Do you have a weak immune system?\" (e.g., CHF, COPD, asthma, HIV positive, chemotherapy, renal failure, diabetes mellitus, sickle cell anemia)        no    Protocols used: CORONAVIRUS (COVID-19) EXPOSURE-A-[place]      "

## 2020-09-25 NOTE — TELEPHONE ENCOUNTER
Phone Number Called: [phone] (home)     Call outcome: Spoke to patient regarding message below.    Message: Confirmed patient is not experiencing any symptoms however daughter tested positive.

## 2020-09-25 NOTE — TELEPHONE ENCOUNTER
I've ordered a COVID-19 test. You have to make an appointment at one of the following [facility] lab locations. Only these lab locations collect the test.    - [facility] [place]  - [facility]  - [facility] [place]  - [facility] [place]  - Drive-thr location in [facility] that doesn't require an appointment    You will be screened at the door and then the test obtained while you wait in your car. Many of the tests are being run in-house and turn around time is typically 2 days, but if there is high demand for the test, there could be a delay of results. In the meantime, I recommend self-quarantine until you receive the results.

## 2020-09-26 ENCOUNTER — HOSPITAL ENCOUNTER (OUTPATIENT)
Dept: LAB | Facility: MEDICAL CENTER | Age: 60
End: 2020-09-26
Attending: FAMILY MEDICINE
Payer: COMMERCIAL

## 2020-09-26 LAB
COVID ORDER STATUS COVID19: NORMAL
SARS-COV-2 RNA RESP QL NAA+PROBE: NOTDETECTED
SPECIMEN SOURCE: NORMAL

## 2020-09-26 PROCEDURE — C9803 HOPD COVID-19 SPEC COLLECT: HCPCS

## 2020-09-26 PROCEDURE — U0003 INFECTIOUS AGENT DETECTION BY NUCLEIC ACID (DNA OR RNA); SEVERE ACUTE RESPIRATORY SYNDROME CORONAVIRUS 2 (SARS-COV-2) (CORONAVIRUS DISEASE [COVID-19]), AMPLIFIED PROBE TECHNIQUE, MAKING USE OF HIGH THROUGHPUT TECHNOLOGIES AS DESCRIBED BY CMS-2020-01-R: HCPCS

## 2020-11-30 RX ORDER — METFORMIN HYDROCHLORIDE EXTENDED-RELEASE TABLETS 1000 MG/1
TABLET, FILM COATED, EXTENDED RELEASE ORAL
Qty: 180 TAB | Refills: 0 | Status: SHIPPED | OUTPATIENT
Start: 2020-11-30 | End: 2021-06-14

## 2020-11-30 RX ORDER — LISINOPRIL 5 MG/1
TABLET ORAL
Qty: 90 TAB | Refills: 0 | Status: SHIPPED | OUTPATIENT
Start: 2020-11-30 | End: 2021-04-05

## 2021-01-26 ENCOUNTER — OFFICE VISIT (OUTPATIENT)
Dept: URGENT CARE | Facility: CLINIC | Age: 61
End: 2021-01-26
Payer: COMMERCIAL

## 2021-01-26 VITALS
RESPIRATION RATE: 16 BRPM | BODY MASS INDEX: 31.24 KG/M2 | HEART RATE: 68 BPM | WEIGHT: 183 LBS | TEMPERATURE: 97.8 F | DIASTOLIC BLOOD PRESSURE: 90 MMHG | OXYGEN SATURATION: 93 % | HEIGHT: 64 IN | SYSTOLIC BLOOD PRESSURE: 140 MMHG

## 2021-01-26 DIAGNOSIS — B37.0 ORAL THRUSH: ICD-10-CM

## 2021-01-26 DIAGNOSIS — R05.9 COUGH: ICD-10-CM

## 2021-01-26 DIAGNOSIS — R09.82 POST-NASAL DRIP: ICD-10-CM

## 2021-01-26 DIAGNOSIS — J02.9 PHARYNGITIS, UNSPECIFIED ETIOLOGY: ICD-10-CM

## 2021-01-26 LAB
INT CON NEG: NORMAL
INT CON POS: NORMAL
S PYO AG THROAT QL: NEGATIVE

## 2021-01-26 PROCEDURE — 99213 OFFICE O/P EST LOW 20 MIN: CPT | Performed by: NURSE PRACTITIONER

## 2021-01-26 PROCEDURE — 87880 STREP A ASSAY W/OPTIC: CPT | Performed by: NURSE PRACTITIONER

## 2021-01-26 RX ORDER — BENZONATATE 100 MG/1
100 CAPSULE ORAL 3 TIMES DAILY PRN
Qty: 60 CAP | Refills: 0 | Status: SHIPPED | OUTPATIENT
Start: 2021-01-26 | End: 2021-05-11 | Stop reason: SDUPTHER

## 2021-01-26 RX ORDER — FLUCONAZOLE 200 MG/1
200 TABLET ORAL DAILY
Qty: 14 TAB | Refills: 0 | Status: SHIPPED | OUTPATIENT
Start: 2021-01-26 | End: 2021-02-09

## 2021-01-26 ASSESSMENT — ENCOUNTER SYMPTOMS
CHILLS: 0
HEADACHES: 0
SORE THROAT: 1
NAUSEA: 0
VOMITING: 0
WHEEZING: 0
SHORTNESS OF BREATH: 0
COUGH: 1
FEVER: 0
DIZZINESS: 0
DIARRHEA: 0
ABDOMINAL PAIN: 0

## 2021-01-26 ASSESSMENT — FIBROSIS 4 INDEX: FIB4 SCORE: 1.16

## 2021-01-26 NOTE — PROGRESS NOTES
Subjective:   Mable Jarrell is a 60 y.o. female who presents for Pharyngitis (started yester, suspects strep) and Cough (since Dec. off and on)      HPI  60 year old female in urgent care for new onset sore throat yesterday and is concerned for strep throat.  Patient states that she feels like the back of her tongue is very painful and thick.  Patient also states that she has had a cough since September and states that she has a difficult time sleeping due to the amount of coughing she gets when she lays down.  Does admit to seasonal allergies and currently takes over-the-counter Claritin with no relief of symptoms.  She has been allergy tested in the past and is wondering if she has further allergies.  Would like a referral to allergy. States that she has tried to see her PCP regarding this but they will not see her due to the cough, although she has been tested for COVID and was negative. She has used OTC throat lozenges for the sore throat     Review of Systems   Constitutional: Negative for chills, fever and malaise/fatigue.   HENT: Positive for sore throat.    Respiratory: Positive for cough. Negative for shortness of breath and wheezing.    Gastrointestinal: Negative for abdominal pain, diarrhea, nausea and vomiting.   Neurological: Negative for dizziness and headaches.       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   • MANISH (obstructive sleep apnea)   • Family history of diabetes mellitus   • Vitamin D insufficiency   • Bilateral tinnitus   • Hyperlipidemia   • Elevated liver enzymes   • Obesity (BMI 30-39.9)   • S/P cholecystectomy   • Pain   • Type 2 diabetes mellitus (HCC)     Past Surgical History:   Procedure Laterality Date   • CHOLECYSTECTOMY     • TONSILLECTOMY       Social History   "    Tobacco Use   • Smoking status: Never Smoker   • Smokeless tobacco: Never Used   Substance Use Topics   • Alcohol use: Yes     Alcohol/week: 0.0 oz     Comment: 2 on the weekends   • Drug use: No      Family History   Problem Relation Age of Onset   • Diabetes Mother    • Diabetes Father    • Heart Disease Father    • No Known Problems Sister    • No Known Problems Brother    • No Known Problems Daughter    • Cancer Neg Hx    • Hypertension Neg Hx    • Hyperlipidemia Neg Hx    • Stroke Neg Hx    • Psychiatric Illness Neg Hx    • Thyroid Neg Hx       (Allergies, Medications, & Tobacco/Substance Use were reconciled by the Medical Assistant and reviewed by myself. The family history is prepopulated)     Objective:     /90 (BP Location: Left arm, Patient Position: Sitting, BP Cuff Size: Adult)   Pulse 68   Temp 36.6 °C (97.8 °F) (Temporal)   Resp 16   Ht 1.626 m (5' 4\")   Wt 83 kg (183 lb)   SpO2 93%   BMI 31.41 kg/m²     Physical Exam  Vitals signs reviewed.   Constitutional:       Appearance: Normal appearance.   HENT:      Nose: Rhinorrhea present.      Mouth/Throat:      Lips: Pink.      Mouth: Mucous membranes are moist.      Pharynx: Uvula midline.      Tonsils: Tonsillar exudate present. 2+ on the right. 2+ on the left.     Cardiovascular:      Rate and Rhythm: Normal rate and regular rhythm.      Heart sounds: Normal heart sounds.   Pulmonary:      Effort: Pulmonary effort is normal.      Breath sounds: Normal breath sounds.   Skin:     General: Skin is warm.   Neurological:      Mental Status: She is alert and oriented to person, place, and time.   Psychiatric:         Mood and Affect: Mood normal.         Behavior: Behavior normal.         Thought Content: Thought content normal.         Judgment: Judgment normal.         Assessment/Plan:     1. Post-nasal drip  REFERRAL TO ALLERGY   2. Pharyngitis, unspecified etiology  POCT Rapid Strep A   3. Oral thrush  fluconazole (DIFLUCAN) 200 MG Tab "   4. Cough  benzonatate (TESSALON) 100 MG Cap     Discussed physical examination findings as well as patient presentation, length patient symptoms is due to oral thrush and postnasal drip more than likely secondary to seasonal allergies.  Advised patient to continue to use Claritin and will provide her a referral to allergy for further work-up and evaluation for ongoing cough.  Also provided patient with Tessalon Perles.  In regards to patient's the rash due to nature of physical examination and patient discomfort will treat orally with fluconazole.  Advised to finish all medications as prescribed.      Differential diagnosis, natural history, supportive care, and indications for immediate follow-up discussed.    Advised the patient to follow-up with the primary care physician for recheck, reevaluation, and consideration of further management.    Please note that this dictation was created using voice recognition software. I have made a reasonable attempt to correct obvious errors, but I expect that there are errors of grammar and possibly content that I did not discover before finalizing the note.    This note was electronically signed LE Way

## 2021-03-15 DIAGNOSIS — Z23 NEED FOR VACCINATION: ICD-10-CM

## 2021-03-23 ENCOUNTER — IMMUNIZATION (OUTPATIENT)
Dept: FAMILY PLANNING/WOMEN'S HEALTH CLINIC | Facility: IMMUNIZATION CENTER | Age: 61
End: 2021-03-23
Attending: INTERNAL MEDICINE
Payer: COMMERCIAL

## 2021-03-23 DIAGNOSIS — Z23 NEED FOR VACCINATION: ICD-10-CM

## 2021-03-23 DIAGNOSIS — Z23 ENCOUNTER FOR VACCINATION: Primary | ICD-10-CM

## 2021-03-23 PROCEDURE — 0001A PFIZER SARS-COV-2 VACCINE: CPT

## 2021-03-23 PROCEDURE — 91300 PFIZER SARS-COV-2 VACCINE: CPT

## 2021-03-24 DIAGNOSIS — E11.9 TYPE 2 DIABETES MELLITUS WITHOUT COMPLICATION, WITHOUT LONG-TERM CURRENT USE OF INSULIN (HCC): ICD-10-CM

## 2021-04-16 ENCOUNTER — IMMUNIZATION (OUTPATIENT)
Dept: FAMILY PLANNING/WOMEN'S HEALTH CLINIC | Facility: IMMUNIZATION CENTER | Age: 61
End: 2021-04-16
Attending: INTERNAL MEDICINE
Payer: COMMERCIAL

## 2021-04-16 DIAGNOSIS — Z23 ENCOUNTER FOR VACCINATION: Primary | ICD-10-CM

## 2021-04-16 PROCEDURE — 91300 PFIZER SARS-COV-2 VACCINE: CPT | Performed by: INTERNAL MEDICINE

## 2021-04-16 PROCEDURE — 0002A PFIZER SARS-COV-2 VACCINE: CPT | Performed by: INTERNAL MEDICINE

## 2021-05-01 LAB — HBA1C MFR BLD: 7.8 % (ref 4.8–5.6)

## 2021-05-11 ENCOUNTER — OFFICE VISIT (OUTPATIENT)
Dept: MEDICAL GROUP | Facility: PHYSICIAN GROUP | Age: 61
End: 2021-05-11
Payer: COMMERCIAL

## 2021-05-11 VITALS
TEMPERATURE: 97.8 F | DIASTOLIC BLOOD PRESSURE: 70 MMHG | BODY MASS INDEX: 33.38 KG/M2 | RESPIRATION RATE: 16 BRPM | WEIGHT: 188.4 LBS | SYSTOLIC BLOOD PRESSURE: 136 MMHG | HEIGHT: 63 IN | OXYGEN SATURATION: 94 % | HEART RATE: 60 BPM

## 2021-05-11 DIAGNOSIS — E66.9 OBESITY (BMI 30-39.9): ICD-10-CM

## 2021-05-11 DIAGNOSIS — R05.9 COUGH: ICD-10-CM

## 2021-05-11 DIAGNOSIS — K62.5 RECTAL BLEEDING: ICD-10-CM

## 2021-05-11 DIAGNOSIS — E11.9 TYPE 2 DIABETES MELLITUS WITHOUT COMPLICATION, WITHOUT LONG-TERM CURRENT USE OF INSULIN (HCC): Primary | ICD-10-CM

## 2021-05-11 DIAGNOSIS — Z90.49 S/P CHOLECYSTECTOMY: ICD-10-CM

## 2021-05-11 PROCEDURE — 99214 OFFICE O/P EST MOD 30 MIN: CPT | Performed by: FAMILY MEDICINE

## 2021-05-11 RX ORDER — IPRATROPIUM BROMIDE 42 UG/1
2 SPRAY, METERED NASAL 3 TIMES DAILY
Qty: 15 ML | Refills: 1 | Status: SHIPPED | OUTPATIENT
Start: 2021-05-11 | End: 2021-06-17 | Stop reason: SDUPTHER

## 2021-05-11 RX ORDER — BENZONATATE 100 MG/1
100 CAPSULE ORAL 3 TIMES DAILY PRN
Qty: 60 CAPSULE | Refills: 0 | Status: SHIPPED | OUTPATIENT
Start: 2021-05-11 | End: 2022-07-28

## 2021-05-11 RX ORDER — MONTELUKAST SODIUM 4 MG/1
1 TABLET, CHEWABLE ORAL DAILY
Qty: 90 TABLET | Refills: 3 | Status: SHIPPED | OUTPATIENT
Start: 2021-05-11 | End: 2022-06-27

## 2021-05-11 ASSESSMENT — FIBROSIS 4 INDEX: FIB4 SCORE: 1.16

## 2021-05-11 ASSESSMENT — PATIENT HEALTH QUESTIONNAIRE - PHQ9: CLINICAL INTERPRETATION OF PHQ2 SCORE: 0

## 2021-05-11 NOTE — PROGRESS NOTES
"Subjective:     CC: f/u labs, med refills, rectal bleeding    HPI:   Mable presents today with     Type 2 diabetes mellitus (HCC)  This is a chronic condition. Diagnosed 6/2020.  At her last appointment we increased her metformin but she only did it for a few days because her diarrhea got a bunch worse.  She has been diligently working on exercising regularly and eating a healthier diet.  She is also cut down her alcohol intake to \"almost nothing\".  Current medications: metformin ER 1000 mg daily  Last A1c: 7.8% (4/2021); 5.8% (9/2020)   Last Microalb/Cr ratio: 68 (7/2020)   Last diabetic foot exam: 6/2020  Last retinal eye exam: 7/2020  ACEi/ARB: lisinopril 5 mg  Statin: atorvastatin 10 mg  Aspirin: recommended  Concomitant HTN: yes - at goal  Nightly foot checks: yes      S/P cholecystectomy  This is a chronic condition. She has diarrhea since her cholecystectomy and is on colestipol to help manage that diarrhea. It works well with once/day dosing and she would like me to take over the prescription.    Cough  This is a chronic condition. She reports a dry cough since 9/2020. She was seen in UC on 1/2021 and they found she had thrush. UC provided benzonatate which helps significantly. She notes worsening cough when laying down. She notes clear rhinorrhea. She thinks it is allergies. She has tried zyrtec, claritin, and allegra. She is on flonase twice daily, which helps a bit. Rhinorrhea worsens when she eats.     Rectal bleeding  She reports intermittent episodes of rectal bleeding.  It is painless bleeding and it always occurs after a hard, painful bowel movement.  She states she struggled constipation for all her life and is try to drink lots of water and eat more greens and prunes.  She was also told that she had the beginning of hemorrhoids in the past.      Current Outpatient Medications Ordered in Epic   Medication Sig Dispense Refill   • colestipol (COLESTID) 1 GM Tab Take 1 tablet by mouth every day. 90 " "tablet 3   • ipratropium (ATROVENT) 0.06 % Solution Administer 2 Sprays into affected nostril(S) in the morning, at noon, and at bedtime. 15 mL 1   • benzonatate (TESSALON) 100 MG Cap Take 1 capsule by mouth 3 times a day as needed for Cough. 60 capsule 0   • lisinopril (PRINIVIL) 5 MG Tab Take 1 tablet by mouth once daily 90 tablet 1   • atorvastatin (LIPITOR) 10 MG Tab Take 1 tablet by mouth once daily 90 tablet 3   • metFORMIN ER 1000 MG TABLET SR 24 HR TAKE 2 TABLETS BY MOUTH WITH DINNER (Patient taking differently: 1,000 mg. Takes one tablet) 180 Tab 0   • fluticasone (FLONASE) 50 MCG/ACT nasal spray Spray 1 Spray in nose every day. 16 g 0   • Cholecalciferol (VITAMIN D PO) Take 2,000 Int'l Units by mouth every day.     • Multiple Vitamin (MULTI-VITAMIN DAILY PO) Take  by mouth.       Current Facility-Administered Medications Ordered in Epic   Medication Dose Route Frequency Provider Last Rate Last Admin   • eucalyptus/peppermint oil (PONARIS NASAL) nasal emollient 0.5 mL  10 Drop Nasal Q6HRS PRN Kranthi Robbins P.A.-C.           Health Maintenance: Completed    ROS:  Pulm: no sob  CV: no chest pain    Objective:     Exam:  /70 (BP Location: Right arm, Patient Position: Sitting, BP Cuff Size: Adult)   Pulse 60   Temp 36.6 °C (97.8 °F) (Temporal)   Resp 16   Ht 1.6 m (5' 3\")   Wt 85.5 kg (188 lb 6.4 oz)   SpO2 94%   BMI 33.37 kg/m²  Body mass index is 33.37 kg/m².    Gen: Alert and oriented, No apparent distress.  Neck: Neck is supple without lymphadenopathy.  Lungs: Normal effort, CTA bilaterally, no wheezes, rhonchi, or rales  CV: Regular rate and rhythm. No murmurs, rubs, or gallops.  Ext: No clubbing, cyanosis, edema.    Assessment & Plan:     60 y.o. female with the following -     1. Type 2 diabetes mellitus without complication, without long-term current use of insulin (HCC)  This is a chronic condition, uncontrolled.  Her A1c has bumped up to 7.8%.  She is currently on Metformin ER 1000 mg " daily and she is get a try to increase it to 2000 mg daily but it did aggravate her diarrhea in the past.  Therefore, if she cannot tolerate due to diarrhea we will have to add a second agent to get her diabetes under control.  She will be due for her yearly lab work before her next visit regarding the diabetes which has been ordered.  -Increase Metformin ER to 2000 mg daily  -If she cannot tolerate that dose, we will add a second agent  - HEMOGLOBIN A1C; Future  - Lipid Profile; Future  - Comp Metabolic Panel; Future  - MICROALBUMIN CREAT RATIO URINE; Future  - REFERRAL TO [facility] IMPROVEMENT PROGRAMS (HIP)    2. S/P cholecystectomy  This is a chronic condition, stable.  She has a history of chronic diarrhea secondary to a cholecystectomy.  She is on colestipol once daily reports it works very well to control the diarrhea and she would like me to refill it today which was provided.  -Continue colestipol 1 g daily    3. Cough  This is a chronic condition, stable.  She states in September, 2020 she has had a dry cough.  She was seen in urgent care in January, 2021 and diagnosed with thrush.  At that time they provided benzonatate Perles which she reports helped significantly.  The cough is worse when she lays down and she suspects it secondary to postnasal drip.  She thinks there is an allergy component and she is currently taking Flonase twice a day but she has tried adding Zyrtec, Claritin, and Allegra without further relief.  She does have clear rhinorrhea very frequently and especially worsens with eating so I wonder if there is also component of vasomotor rhinitis leading to postnasal drip and her cough.  -Continue Flonase twice daily  -Add ipratropium 0.06% nasal spray 2-3 times daily  - benzonatate (TESSALON) 100 MG Cap; Take 1 capsule by mouth 3 times a day as needed for Cough.  Dispense: 60 capsule; Refill: 0  -If these measures do not resolve the cough, we will have to start working up chronic  cough    4. Rectal bleeding  This is a chronic condition.  She reports that she will get intermittent episodes of painless, bright red blood per rectum after hard, painful bowel movements.  I suspect it secondary to an internal hemorrhoid bleeding due to the hard stool.  Therefore, we had a long discussion today regarding ways to manage chronic constipation, including adequate water intake and fiber intake.  I also recommended MiraLAX to help manage constipation as well.  - CBC WITHOUT DIFFERENTIAL; Future    5. Obesity (BMI 30-39.9)  This is a chronic condition, slightly worse.  She is gained 5 pounds since her last visit in January, 2021.  She met she is not been very diligent with her exercise due to outside life stressors and family stressors.  She is interested in meeting with her health improvement program to help manage her nutrition needs for her diabetes and to help her lose weight.  - REFERRAL TO [facility] HEALTH IMPROVEMENT PROGRAMS (HIP)      Return in about 3 months (around 8/11/2021) for f/u DM, shoulder?.    Please note that this dictation was created using voice recognition software. I have made every reasonable attempt to correct obvious errors, but I expect that there are errors of grammar and possibly content that I did not discover before finalizing the note.

## 2021-05-11 NOTE — ASSESSMENT & PLAN NOTE
She reports intermittent episodes of rectal bleeding.  It is painless bleeding and it always occurs after a hard, painful bowel movement.  She states she struggled constipation for all her life and is try to drink lots of water and eat more greens and prunes.  She was also told that she had the beginning of hemorrhoids in the past.

## 2021-05-11 NOTE — ASSESSMENT & PLAN NOTE
This is a chronic condition. She has diarrhea since her cholecystectomy and is on colestipol to help manage that diarrhea. It works well with once/day dosing and she would like me to take over the prescription.

## 2021-05-11 NOTE — ASSESSMENT & PLAN NOTE
This is a chronic condition.  Max weight: 195 lbs (9/2017)  Current weight: 188 lbs  Weight change: +5 lbs since 1/2021  BMI: 33.37  Diet: combo of keto & mediterranean  Exercise: none recently

## 2021-05-11 NOTE — PATIENT INSTRUCTIONS
Constipation is a fairly common problem, and fortunately there are many good treatments available.  Listed below are things you can do to help with this issue.  I recommend you start with the first suggestion listed, and if that is not enough to help then keep working your way down the list until you get to the point where you are having normal bowel movements, then try backing off down the list to see if you can maintain normal bowel movements with something less aggressive.    1)  Increasing water intake to about 80 ounces a day (a bit more than a half gallon of water) can help.  In addition, regular exercise can make an enormous difference besides being generally good for you anyway.  Making sure your diet includes plenty of fruits and vegetables is also very helpful.    2)  Adding additional fiber to your diet with products like metamucil, benefiber, citrucel, or psyllium can help by adding bulk to your stool, keeping it from getting quite so packed down. Goal for women is 25 grams per day.     3) Polyethylene glycol (Miralax or its generic equivalent) is an osmotic laxative, meaning it brings extra water into your colon, helping keep your stool from getting hard and packed down.  One capful a day should give normal soft stool within about 2-3 days.  If not, consider trying 1 capful twice a day.    4) Milk of magnesia (30 ml daily) or magnesium citrate (1/2 to 1 bottle daily), or a bisacodyl (Dulcolax) suppository can be used next to get things moving.    5) If this has been ineffective, using Senna-S, 1-2 tablets one to two times daily can be helpful.    6) If all these measures have been ineffective, you can try a mineral oil enema or a warm tap water enema to see if this helps things.

## 2021-05-11 NOTE — LETTER
[facility]  Sherine Cleary M.D.  [address]  [place] [address]  Fax: [phone]   Authorization for Release/Disclosure of   Protected Health Information   Name: ADAN DOVER : 1960 SSN: [id number]   Address: [address] [place] [address] [place] [address] Phone:    [phone] (home)    I authorize the entity listed below to release/disclose the PHI below to:   [facility]/Sherine Cleary M.D. and Sherine Cleary M.D.   Provider or Entity Name:  [facility]   Address   City, State, Zip   Phone:      Fax:     Reason for request: continuity of care   Information to be released:    [  ] LAST COLONOSCOPY,  including any PATH REPORT and follow-up  [  ] LAST FIT/COLOGUARD RESULT [  ] LAST DEXA  [  ] LAST MAMMOGRAM  [  ] LAST PAP  [  ] LAST LABS [XX] RETINA EXAM REPORT  [  ] IMMUNIZATION RECORDS  [  ] Release all info      [  ] Check here and initial the line next to each item to release ALL health information INCLUDING  _____ Care and treatment for drug and / or alcohol abuse  _____ HIV testing, infection status, or AIDS  _____ Genetic Testing    DATES OF SERVICE OR TIME PERIOD TO BE DISCLOSED: _____________  I understand and acknowledge that:  * This Authorization may be revoked at any time by you in writing, except if your health information has already been used or disclosed.  * Your health information that will be used or disclosed as a result of you signing this authorization could be re-disclosed by the recipient. If this occurs, your re-disclosed health information may no longer be protected by State or Federal laws.  * You may refuse to sign this Authorization. Your refusal will not affect your ability to obtain treatment.  * This Authorization becomes effective upon signing and will  on (date) __________.      If no date is indicated, this Authorization will  one (1) year from the signature date.    Name: Adan Dover    Signature:   Date:     2021        PLEASE FAX REQUESTED RECORDS BACK TO: [phone]

## 2021-05-11 NOTE — ASSESSMENT & PLAN NOTE
This is a chronic condition. She reports a dry cough since 9/2020. She was seen in  on 1/2021 and they found she had thrush.  provided benzonatate which helps significantly. She notes worsening cough when laying down. She notes clear rhinorrhea. She thinks it is allergies. She has tried zyrtec, claritin, and allegra. She is on flonase twice daily, which helps a bit. Rhinorrhea worsens when she eats.

## 2021-05-11 NOTE — ASSESSMENT & PLAN NOTE
"This is a chronic condition. Diagnosed 6/2020.  At her last appointment we increased her metformin but she only did it for a few days because her diarrhea got a bunch worse.  She has been diligently working on exercising regularly and eating a healthier diet.  She is also cut down her alcohol intake to \"almost nothing\".  Current medications: metformin ER 1000 mg daily  Last A1c: 7.8% (4/2021); 5.8% (9/2020)   Last Microalb/Cr ratio: 68 (7/2020)   Last diabetic foot exam: 6/2020  Last retinal eye exam: 7/2020  ACEi/ARB: lisinopril 5 mg  Statin: atorvastatin 10 mg  Aspirin: recommended  Concomitant HTN: yes - at goal  Nightly foot checks: yes    "

## 2021-05-28 ENCOUNTER — APPOINTMENT (OUTPATIENT)
Dept: SLEEP MEDICINE | Facility: MEDICAL CENTER | Age: 61
End: 2021-05-28
Payer: COMMERCIAL

## 2021-06-14 RX ORDER — METFORMIN HYDROCHLORIDE EXTENDED-RELEASE TABLETS 1000 MG/1
TABLET, FILM COATED, EXTENDED RELEASE ORAL
Qty: 180 TABLET | Refills: 0 | Status: SHIPPED | OUTPATIENT
Start: 2021-06-14 | End: 2021-10-18

## 2021-06-17 RX ORDER — IPRATROPIUM BROMIDE 42 UG/1
2 SPRAY, METERED NASAL 3 TIMES DAILY
Qty: 15 ML | Refills: 0 | Status: SHIPPED | OUTPATIENT
Start: 2021-06-17 | End: 2024-03-22 | Stop reason: CLARIF

## 2021-06-24 ENCOUNTER — APPOINTMENT (OUTPATIENT)
Dept: MEDICAL GROUP | Facility: PHYSICIAN GROUP | Age: 61
End: 2021-06-24
Payer: COMMERCIAL

## 2021-06-25 LAB
BACTERIA UR CULT: NORMAL
BACTERIA UR CULT: NORMAL

## 2021-07-06 ENCOUNTER — APPOINTMENT (OUTPATIENT)
Dept: SLEEP MEDICINE | Facility: MEDICAL CENTER | Age: 61
End: 2021-07-06
Payer: COMMERCIAL

## 2021-08-09 ENCOUNTER — APPOINTMENT (OUTPATIENT)
Dept: MEDICAL GROUP | Facility: PHYSICIAN GROUP | Age: 61
End: 2021-08-09
Payer: COMMERCIAL

## 2021-08-13 LAB
ALBUMIN SERPL-MCNC: 4.7 G/DL (ref 3.8–4.8)
ALBUMIN/CREAT UR: 109 MG/G CREAT (ref 0–29)
ALBUMIN/GLOB SERPL: 2 {RATIO} (ref 1.2–2.2)
ALP SERPL-CCNC: 66 IU/L (ref 48–121)
ALT SERPL-CCNC: 45 IU/L (ref 0–32)
AST SERPL-CCNC: 34 IU/L (ref 0–40)
BASOPHILS # BLD AUTO: 0.1 X10E3/UL (ref 0–0.2)
BASOPHILS NFR BLD AUTO: 1 %
BILIRUB SERPL-MCNC: 0.7 MG/DL (ref 0–1.2)
BUN SERPL-MCNC: 16 MG/DL (ref 8–27)
BUN/CREAT SERPL: 22 (ref 12–28)
CALCIUM SERPL-MCNC: 9.9 MG/DL (ref 8.7–10.3)
CHLORIDE SERPL-SCNC: 104 MMOL/L (ref 96–106)
CHOLEST SERPL-MCNC: 125 MG/DL (ref 100–199)
CO2 SERPL-SCNC: 23 MMOL/L (ref 20–29)
CREAT SERPL-MCNC: 0.73 MG/DL (ref 0.57–1)
CREAT UR-MCNC: 154.4 MG/DL
EOSINOPHIL # BLD AUTO: 0.3 X10E3/UL (ref 0–0.4)
EOSINOPHIL NFR BLD AUTO: 5 %
ERYTHROCYTE [DISTWIDTH] IN BLOOD BY AUTOMATED COUNT: 12.4 % (ref 11.7–15.4)
GLOBULIN SER CALC-MCNC: 2.4 G/DL (ref 1.5–4.5)
GLUCOSE SERPL-MCNC: 162 MG/DL (ref 65–99)
HBA1C MFR BLD: 6.8 % (ref 4.8–5.6)
HCT VFR BLD AUTO: 44.4 % (ref 34–46.6)
HDLC SERPL-MCNC: 48 MG/DL
HGB BLD-MCNC: 15.1 G/DL (ref 11.1–15.9)
IMM GRANULOCYTES # BLD AUTO: 0 X10E3/UL (ref 0–0.1)
IMM GRANULOCYTES NFR BLD AUTO: 0 %
IMMATURE CELLS  115398: NORMAL
LABORATORY COMMENT REPORT: ABNORMAL
LDLC SERPL CALC-MCNC: 51 MG/DL (ref 0–99)
LYMPHOCYTES # BLD AUTO: 2.6 X10E3/UL (ref 0.7–3.1)
LYMPHOCYTES NFR BLD AUTO: 38 %
MCH RBC QN AUTO: 31.5 PG (ref 26.6–33)
MCHC RBC AUTO-ENTMCNC: 34 G/DL (ref 31.5–35.7)
MCV RBC AUTO: 93 FL (ref 79–97)
MICROALBUMIN UR-MCNC: 168.9 UG/ML
MONOCYTES # BLD AUTO: 0.4 X10E3/UL (ref 0.1–0.9)
MONOCYTES NFR BLD AUTO: 6 %
MORPHOLOGY BLD-IMP: NORMAL
NEUTROPHILS # BLD AUTO: 3.3 X10E3/UL (ref 1.4–7)
NEUTROPHILS NFR BLD AUTO: 50 %
NRBC BLD AUTO-RTO: NORMAL %
PLATELET # BLD AUTO: 293 X10E3/UL (ref 150–450)
POTASSIUM SERPL-SCNC: 5 MMOL/L (ref 3.5–5.2)
PROT SERPL-MCNC: 7.1 G/DL (ref 6–8.5)
RBC # BLD AUTO: 4.8 X10E6/UL (ref 3.77–5.28)
SODIUM SERPL-SCNC: 141 MMOL/L (ref 134–144)
TRIGL SERPL-MCNC: 152 MG/DL (ref 0–149)
VLDLC SERPL CALC-MCNC: 26 MG/DL (ref 5–40)
WBC # BLD AUTO: 6.8 X10E3/UL (ref 3.4–10.8)

## 2021-08-23 ENCOUNTER — HOSPITAL ENCOUNTER (OUTPATIENT)
Facility: MEDICAL CENTER | Age: 61
End: 2021-08-23
Attending: INTERNAL MEDICINE
Payer: COMMERCIAL

## 2021-08-23 PROCEDURE — 81001 URINALYSIS AUTO W/SCOPE: CPT

## 2021-08-24 ENCOUNTER — OFFICE VISIT (OUTPATIENT)
Dept: MEDICAL GROUP | Facility: PHYSICIAN GROUP | Age: 61
End: 2021-08-24
Payer: COMMERCIAL

## 2021-08-24 VITALS
OXYGEN SATURATION: 98 % | DIASTOLIC BLOOD PRESSURE: 78 MMHG | WEIGHT: 182.6 LBS | HEIGHT: 64 IN | SYSTOLIC BLOOD PRESSURE: 132 MMHG | BODY MASS INDEX: 31.18 KG/M2 | RESPIRATION RATE: 16 BRPM | HEART RATE: 66 BPM | TEMPERATURE: 98.5 F

## 2021-08-24 DIAGNOSIS — E66.9 OBESITY (BMI 30-39.9): ICD-10-CM

## 2021-08-24 DIAGNOSIS — R80.9 TYPE 2 DIABETES MELLITUS WITH MICROALBUMINURIA, WITHOUT LONG-TERM CURRENT USE OF INSULIN (HCC): Primary | ICD-10-CM

## 2021-08-24 DIAGNOSIS — R39.15 URINARY URGENCY: ICD-10-CM

## 2021-08-24 DIAGNOSIS — Z12.31 ENCOUNTER FOR SCREENING MAMMOGRAM FOR MALIGNANT NEOPLASM OF BREAST: ICD-10-CM

## 2021-08-24 DIAGNOSIS — R05.9 COUGH: ICD-10-CM

## 2021-08-24 DIAGNOSIS — R31.0 GROSS HEMATURIA: ICD-10-CM

## 2021-08-24 DIAGNOSIS — E11.29 TYPE 2 DIABETES MELLITUS WITH MICROALBUMINURIA, WITHOUT LONG-TERM CURRENT USE OF INSULIN (HCC): Primary | ICD-10-CM

## 2021-08-24 PROBLEM — K64.9 HEMORRHOIDS: Status: ACTIVE | Noted: 2021-05-11

## 2021-08-24 PROBLEM — R52 PAIN: Status: RESOLVED | Noted: 2020-06-15 | Resolved: 2021-08-24

## 2021-08-24 PROCEDURE — 99214 OFFICE O/P EST MOD 30 MIN: CPT | Performed by: FAMILY MEDICINE

## 2021-08-24 ASSESSMENT — FIBROSIS 4 INDEX: FIB4 SCORE: 1.06

## 2021-08-24 NOTE — ASSESSMENT & PLAN NOTE
This is a chronic condition.  Max weight: 195 lbs (9/2017)  Current weight: 182 lbs  Weight change: -6 lbs since 5/2021  BMI: 31.34  Diet: more greens, less meat, smaller portions  Exercise: unable due to air quality

## 2021-08-24 NOTE — ASSESSMENT & PLAN NOTE
This is a chronic condition. Diagnosed 6/2020. She reports she has tweaked some things in her diet.   Current medications:  Insulin: -  Biguanide: metformin ER 2000 mg daily (taking majority of time)  GLP1-RA: -  SGLT-2i: -  DPP4-I: -  TZD: -  Danae: -   Sulfonyluria: -    Last A1c: 6.8% (8/2021); 7.8% (4/2021)  Last Microalb/Cr ratio: 109 (8/2021)  Fasting sugars: n/a  Last diabetic foot exam: 6/2020 - due  Last retinal eye exam: requesting records - reports 8/2020  ACEi/ARB: lisinopril 5 mg  Statin: atorvastatin 10 mg  Aspirin: recommended  Concomitant HTN: no - at goal  Nightly foot checks: yes

## 2021-08-24 NOTE — ASSESSMENT & PLAN NOTE
She's had a dry cough since 9/2020. Today she reports it is about 90% better. She hasn't needed medication for the cough. She has been using flonase and atrovent nasal spray, finds the atrovent most helpful.

## 2021-08-24 NOTE — PROGRESS NOTES
Subjective:     CC: f/u DM    HPI:   Mable presents today with     Type 2 diabetes mellitus (HCC)  This is a chronic condition. Diagnosed 6/2020. She reports she has tweaked some things in her diet.   Current medications:  Insulin: -  Biguanide: metformin ER 2000 mg daily (taking majority of time)  GLP1-RA: -  SGLT-2i: -  DPP4-I: -  TZD: -  Danae: -   Sulfonyluria: -    Last A1c: 6.8% (8/2021); 7.8% (4/2021)  Last Microalb/Cr ratio: 109 (8/2021)  Fasting sugars: n/a  Last diabetic foot exam: 6/2020 - due  Last retinal eye exam: requesting records - reports 8/2020  ACEi/ARB: lisinopril 5 mg  Statin: atorvastatin 10 mg  Aspirin: recommended  Concomitant HTN: no - at goal  Nightly foot checks: yes    Cough  She's had a dry cough since 9/2020. Today she reports it is about 90% better. She hasn't needed medication for the cough. She has been using flonase and atrovent nasal spray, finds the atrovent most helpful.    Obesity (BMI 30-39.9)  This is a chronic condition.  Max weight: 195 lbs (9/2017)  Current weight: 182 lbs  Weight change: -6 lbs since 5/2021  BMI: 31.34  Diet: more greens, less meat, smaller portions  Exercise: unable due to air quality        Current Outpatient Medications Ordered in Epic   Medication Sig Dispense Refill   • ipratropium (ATROVENT) 0.06 % Solution Administer 2 Sprays into affected nostril(S) in the morning, at noon, and at bedtime. 15 mL 0   • metFORMIN ER 1000 MG TABLET SR 24 HR TAKE 2 TABLETS BY MOUTH WITH SUPPER 180 tablet 0   • colestipol (COLESTID) 1 GM Tab Take 1 tablet by mouth every day. 90 tablet 3   • benzonatate (TESSALON) 100 MG Cap Take 1 capsule by mouth 3 times a day as needed for Cough. 60 capsule 0   • lisinopril (PRINIVIL) 5 MG Tab Take 1 tablet by mouth once daily 90 tablet 1   • atorvastatin (LIPITOR) 10 MG Tab Take 1 tablet by mouth once daily 90 tablet 3   • fluticasone (FLONASE) 50 MCG/ACT nasal spray Spray 1 Spray in nose every day. 16 g 0   • Cholecalciferol (VITAMIN  "D PO) Take 2,000 Int'l Units by mouth every day.     • Multiple Vitamin (MULTI-VITAMIN DAILY PO) Take  by mouth.       Current Facility-Administered Medications Ordered in Epic   Medication Dose Route Frequency Provider Last Rate Last Admin   • eucalyptus/peppermint oil (PONARIS NASAL) nasal emollient 0.5 mL  10 Drop Nasal Q6HRS PRN Kranthi Robbins P.A.-C.           Health Maintenance: Completed    ROS:  Gen: no fevers/chills  Pulm: no sob  CV: no chest pain      Objective:     Exam:  /78 (BP Location: Left arm, Patient Position: Sitting, BP Cuff Size: Adult)   Pulse 66   Temp 36.9 °C (98.5 °F) (Temporal)   Resp 16   Ht 1.626 m (5' 4\")   Wt 82.8 kg (182 lb 9.6 oz)   SpO2 98%   BMI 31.34 kg/m²  Body mass index is 31.34 kg/m².    Gen: Alert and oriented, No apparent distress.  Neck: Neck is supple without lymphadenopathy.  Lungs: Normal effort, CTA bilaterally, no wheezes, rhonchi, or rales  CV: Regular rate and rhythm. No murmurs, rubs, or gallops.  Ext: No clubbing, cyanosis, edema.  Diabetic foot exam: No lesions or calluses noted. 2+ pedal pulses. Sensation intact with 10 out of 10 on monofilament test.      Assessment & Plan:     60 y.o. female with the following -     1. Type 2 diabetes mellitus with microalbuminuria, without long-term current use of insulin (HCC)  This is a chronic condition, controlled.  Hemoglobin A1c is under 7.0%. She is up-to-date with all of her diabetic screenings, on an ACE inhibitor for renal protection, and is on a statin for cardiovascular protection. We're still attempting to track down her retinal screening records. In the meantime, we will continue the current regimen.  -Continue Metformin ER 2000 mg daily  - Diabetic Monofilament Lower Extremity Exam    2. Cough  This is a chronic condition, improving. Since September, 2020 she is noted a dry cough. She reports approximately 90% improvement. She is on Flonase and Atrovent nasal spray and she finds the Atrovent most " helpful.  -Continue nasal sprays    3. Obesity (BMI 30-39.9)  This is a chronic condition, improving. She is lost 6 pounds since May, 2021. She has been working on a healthier diet with more greens, less meat, and smaller portion sizes. I congratulated her on her current progress and encouraged her to continue working at it.    4. Urinary urgency  5. Gross hematuria  This is an acute condition. Previously she had mentioned gross hematuria. I had ordered a urinalysis that was faxed to [facility] as that is the lab her insurance company is contracted with. Unfortunately, they never got the complete order so they never actually did a urinalysis to evaluate for hematuria. She never went back to the lab so are collecting a urine here to then sent to [facility] so we can evaluate. She does note some urinary urgency but infrequently.  - URINALYSIS,CULTURE IF INDICATED; Future    6. Encounter for screening mammogram for malignant neoplasm of breast  - MA-SCREENING MAMMO BILAT W/TOMOSYNTHESIS W/CAD; Future    Return in about 3 months (around 11/24/2021) for f/u DM, get A1c.    Please note that this dictation was created using voice recognition software. I have made every reasonable attempt to correct obvious errors, but I expect that there are errors of grammar and possibly content that I did not discover before finalizing the note.

## 2021-08-25 LAB
APPEARANCE UR: CLEAR
BACTERIA #/AREA URNS HPF: ABNORMAL /HPF
BILIRUB UR QL STRIP.AUTO: NEGATIVE
COLOR UR: YELLOW
EPI CELLS #/AREA URNS HPF: ABNORMAL /HPF
GLUCOSE UR STRIP.AUTO-MCNC: NEGATIVE MG/DL
HYALINE CASTS #/AREA URNS LPF: ABNORMAL /LPF
KETONES UR STRIP.AUTO-MCNC: NEGATIVE MG/DL
LEUKOCYTE ESTERASE UR QL STRIP.AUTO: ABNORMAL
MICRO URNS: ABNORMAL
MUCOUS THREADS #/AREA URNS HPF: ABNORMAL /HPF
NITRITE UR QL STRIP.AUTO: NEGATIVE
PH UR STRIP.AUTO: 5.5 [PH] (ref 5–8)
PROT UR QL STRIP: 30 MG/DL
RBC # URNS HPF: ABNORMAL /HPF
RBC UR QL AUTO: NEGATIVE
RENAL EPI CELLS #/AREA URNS HPF: ABNORMAL /HPF
SP GR UR STRIP.AUTO: 1.02
UROBILINOGEN UR STRIP.AUTO-MCNC: 0.2 MG/DL
WBC #/AREA URNS HPF: ABNORMAL /HPF

## 2021-10-18 RX ORDER — METFORMIN HYDROCHLORIDE EXTENDED-RELEASE TABLETS 1000 MG/1
TABLET, FILM COATED, EXTENDED RELEASE ORAL
Qty: 180 TABLET | Refills: 0 | Status: SHIPPED | OUTPATIENT
Start: 2021-10-18 | End: 2022-02-18

## 2021-11-08 RX ORDER — LISINOPRIL 5 MG/1
TABLET ORAL
Qty: 90 TABLET | Refills: 3 | Status: SHIPPED | OUTPATIENT
Start: 2021-11-08 | End: 2022-03-08

## 2022-01-14 LAB — HBA1C MFR BLD: 6.8 % (ref 4.8–5.6)

## 2022-02-18 RX ORDER — ATORVASTATIN CALCIUM 10 MG/1
TABLET, FILM COATED ORAL
Qty: 30 TABLET | Refills: 0 | Status: SHIPPED | OUTPATIENT
Start: 2022-02-18 | End: 2022-03-28

## 2022-02-18 RX ORDER — METFORMIN HYDROCHLORIDE EXTENDED-RELEASE TABLETS 1000 MG/1
TABLET, FILM COATED, EXTENDED RELEASE ORAL
Qty: 180 TABLET | Refills: 0 | Status: SHIPPED | OUTPATIENT
Start: 2022-02-18 | End: 2023-01-09

## 2022-03-08 ENCOUNTER — OFFICE VISIT (OUTPATIENT)
Dept: MEDICAL GROUP | Facility: PHYSICIAN GROUP | Age: 62
End: 2022-03-08
Payer: COMMERCIAL

## 2022-03-08 VITALS
BODY MASS INDEX: 31.07 KG/M2 | HEART RATE: 64 BPM | SYSTOLIC BLOOD PRESSURE: 134 MMHG | WEIGHT: 182 LBS | HEIGHT: 64 IN | RESPIRATION RATE: 16 BRPM | OXYGEN SATURATION: 96 % | DIASTOLIC BLOOD PRESSURE: 84 MMHG | TEMPERATURE: 97.6 F

## 2022-03-08 DIAGNOSIS — Z00.00 WELLNESS EXAMINATION: ICD-10-CM

## 2022-03-08 DIAGNOSIS — Z12.31 ENCOUNTER FOR SCREENING MAMMOGRAM FOR MALIGNANT NEOPLASM OF BREAST: Primary | ICD-10-CM

## 2022-03-08 PROCEDURE — 99396 PREV VISIT EST AGE 40-64: CPT | Performed by: FAMILY MEDICINE

## 2022-03-08 RX ORDER — LOSARTAN POTASSIUM 25 MG/1
25 TABLET ORAL DAILY
Qty: 90 TABLET | Refills: 3 | Status: SHIPPED | OUTPATIENT
Start: 2022-03-08 | End: 2023-01-19 | Stop reason: SDUPTHER

## 2022-03-08 SDOH — ECONOMIC STABILITY: INCOME INSECURITY: IN THE LAST 12 MONTHS, WAS THERE A TIME WHEN YOU WERE NOT ABLE TO PAY THE MORTGAGE OR RENT ON TIME?: PATIENT REFUSED

## 2022-03-08 SDOH — ECONOMIC STABILITY: FOOD INSECURITY: WITHIN THE PAST 12 MONTHS, THE FOOD YOU BOUGHT JUST DIDN'T LAST AND YOU DIDN'T HAVE MONEY TO GET MORE.: PATIENT DECLINED

## 2022-03-08 SDOH — HEALTH STABILITY: PHYSICAL HEALTH: ON AVERAGE, HOW MANY MINUTES DO YOU ENGAGE IN EXERCISE AT THIS LEVEL?: 30 MIN

## 2022-03-08 SDOH — ECONOMIC STABILITY: HOUSING INSECURITY
IN THE LAST 12 MONTHS, WAS THERE A TIME WHEN YOU DID NOT HAVE A STEADY PLACE TO SLEEP OR SLEPT IN A SHELTER (INCLUDING NOW)?: PATIENT REFUSED

## 2022-03-08 SDOH — ECONOMIC STABILITY: FOOD INSECURITY: WITHIN THE PAST 12 MONTHS, YOU WORRIED THAT YOUR FOOD WOULD RUN OUT BEFORE YOU GOT MONEY TO BUY MORE.: PATIENT DECLINED

## 2022-03-08 SDOH — ECONOMIC STABILITY: TRANSPORTATION INSECURITY
IN THE PAST 12 MONTHS, HAS THE LACK OF TRANSPORTATION KEPT YOU FROM MEDICAL APPOINTMENTS OR FROM GETTING MEDICATIONS?: PATIENT DECLINED

## 2022-03-08 SDOH — HEALTH STABILITY: PHYSICAL HEALTH: ON AVERAGE, HOW MANY DAYS PER WEEK DO YOU ENGAGE IN MODERATE TO STRENUOUS EXERCISE (LIKE A BRISK WALK)?: 4 DAYS

## 2022-03-08 SDOH — ECONOMIC STABILITY: HOUSING INSECURITY

## 2022-03-08 SDOH — ECONOMIC STABILITY: TRANSPORTATION INSECURITY
IN THE PAST 12 MONTHS, HAS LACK OF TRANSPORTATION KEPT YOU FROM MEETINGS, WORK, OR FROM GETTING THINGS NEEDED FOR DAILY LIVING?: PATIENT DECLINED

## 2022-03-08 SDOH — HEALTH STABILITY: MENTAL HEALTH
STRESS IS WHEN SOMEONE FEELS TENSE, NERVOUS, ANXIOUS, OR CAN'T SLEEP AT NIGHT BECAUSE THEIR MIND IS TROUBLED. HOW STRESSED ARE YOU?: TO SOME EXTENT

## 2022-03-08 SDOH — ECONOMIC STABILITY: INCOME INSECURITY: HOW HARD IS IT FOR YOU TO PAY FOR THE VERY BASICS LIKE FOOD, HOUSING, MEDICAL CARE, AND HEATING?: PATIENT DECLINED

## 2022-03-08 SDOH — ECONOMIC STABILITY: TRANSPORTATION INSECURITY
IN THE PAST 12 MONTHS, HAS LACK OF RELIABLE TRANSPORTATION KEPT YOU FROM MEDICAL APPOINTMENTS, MEETINGS, WORK OR FROM GETTING THINGS NEEDED FOR DAILY LIVING?: PATIENT DECLINED

## 2022-03-08 ASSESSMENT — LIFESTYLE VARIABLES
HOW OFTEN DO YOU HAVE SIX OR MORE DRINKS ON ONE OCCASION: NEVER
HOW OFTEN DO YOU HAVE A DRINK CONTAINING ALCOHOL: MONTHLY OR LESS
HOW MANY STANDARD DRINKS CONTAINING ALCOHOL DO YOU HAVE ON A TYPICAL DAY: 1 OR 2

## 2022-03-08 ASSESSMENT — SOCIAL DETERMINANTS OF HEALTH (SDOH)
HOW OFTEN DO YOU GET TOGETHER WITH FRIENDS OR RELATIVES?: PATIENT DECLINED
HOW OFTEN DO YOU HAVE A DRINK CONTAINING ALCOHOL: MONTHLY OR LESS
HOW OFTEN DO YOU ATTEND CHURCH OR RELIGIOUS SERVICES?: PATIENT DECLINED
HOW HARD IS IT FOR YOU TO PAY FOR THE VERY BASICS LIKE FOOD, HOUSING, MEDICAL CARE, AND HEATING?: PATIENT DECLINED
IN A TYPICAL WEEK, HOW MANY TIMES DO YOU TALK ON THE PHONE WITH FAMILY, FRIENDS, OR NEIGHBORS?: PATIENT DECLINED
HOW OFTEN DO YOU ATTENT MEETINGS OF THE CLUB OR ORGANIZATION YOU BELONG TO?: PATIENT DECLINED
HOW OFTEN DO YOU ATTENT MEETINGS OF THE CLUB OR ORGANIZATION YOU BELONG TO?: PATIENT DECLINED
HOW OFTEN DO YOU ATTEND CHURCH OR RELIGIOUS SERVICES?: PATIENT DECLINED
HOW MANY DRINKS CONTAINING ALCOHOL DO YOU HAVE ON A TYPICAL DAY WHEN YOU ARE DRINKING: 1 OR 2
HOW OFTEN DO YOU HAVE SIX OR MORE DRINKS ON ONE OCCASION: NEVER
DO YOU BELONG TO ANY CLUBS OR ORGANIZATIONS SUCH AS CHURCH GROUPS UNIONS, FRATERNAL OR ATHLETIC GROUPS, OR SCHOOL GROUPS?: PATIENT DECLINED
WITHIN THE PAST 12 MONTHS, YOU WORRIED THAT YOUR FOOD WOULD RUN OUT BEFORE YOU GOT THE MONEY TO BUY MORE: PATIENT DECLINED
HOW OFTEN DO YOU GET TOGETHER WITH FRIENDS OR RELATIVES?: PATIENT DECLINED
ARE YOU MARRIED, WIDOWED, DIVORCED, SEPARATED, NEVER MARRIED, OR LIVING WITH A PARTNER?: PATIENT DECLINED
ARE YOU MARRIED, WIDOWED, DIVORCED, SEPARATED, NEVER MARRIED, OR LIVING WITH A PARTNER?: PATIENT DECLINED
DO YOU BELONG TO ANY CLUBS OR ORGANIZATIONS SUCH AS CHURCH GROUPS UNIONS, FRATERNAL OR ATHLETIC GROUPS, OR SCHOOL GROUPS?: PATIENT DECLINED
IN A TYPICAL WEEK, HOW MANY TIMES DO YOU TALK ON THE PHONE WITH FAMILY, FRIENDS, OR NEIGHBORS?: PATIENT DECLINED

## 2022-03-08 ASSESSMENT — PATIENT HEALTH QUESTIONNAIRE - PHQ9: CLINICAL INTERPRETATION OF PHQ2 SCORE: 0

## 2022-03-08 ASSESSMENT — FIBROSIS 4 INDEX: FIB4 SCORE: 1.07

## 2022-03-08 NOTE — PROGRESS NOTES
Subjective:     CC:   Chief Complaint   Patient presents with   • Annual Exam       HPI:   Mable Jarrell is a 61 y.o. female who presents for annual exam. She is feeling well and denies any complaints.    Ob-Gyn/ History:    Patient has GYN provider: no  /Para:    Last Pap Smear:  . No history of abnormal pap smears.  Gyn Surgery:  none.  Current Contraceptive Method:  N/a. Yes currently sexually active.  Last menstrual period:  ~.   No significant bloating/fluid retention, pelvic pain, or dyspareunia. No vaginal discharge  Post-menopausal bleeding: no  Urinary incontinence: +stress & urge - mild  Folate intake: n/a     Health Maintenance  Advanced directive: n/a   Osteoporosis Screen/ DEXA: n/a   PT/vit D for falls prevention: n/a   Cholesterol Screenin2021 - T chol 125, , HDL 48, LDL 51; atorvastatin  Diabetes Screening: n/a - has T2DM   Aspirin Use: n/a - age >60    Diet: trying to eat healthy   Exercise: harder thru winter, mainly walks   Substance Abuse: No   Safe in relationship.   Seat belts, bike helmet, gun safety discussed.  Sun protection used.    Cancer screening  Colorectal Cancer Screening: due     Lung Cancer Screening: n/a - never smoker    Cervical Cancer Screening: due 2022   Breast Cancer Screening: due 2022     Infectious disease screening/Immunizations  --STI Screening: declined   --Practices safe sex.  --HIV Screening: declined  --Hepatitis C Screening: completed - negative ()  --Immunizations:    Influenza: completed    HPV:  n/a    Tetanus: due     Shingles: completed   Pneumococcal : due      COVID-19: completed (3/3 doses)  Other immunizations: n/a     She  has a past medical history of Chickenpox, Hirsutism (), Hyperprolactinemia (HCC) (), Melanoma (HCC), Mumps, Pituitary abnormality (HCC), and Tonsillitis.  She  has a past surgical history that includes cholecystectomy and tonsillectomy.    Family History   Problem  Relation Age of Onset   • Diabetes Mother    • Diabetes Father    • Heart Disease Father    • No Known Problems Sister    • No Known Problems Brother    • No Known Problems Daughter    • Cancer Neg Hx    • Hypertension Neg Hx    • Hyperlipidemia Neg Hx    • Stroke Neg Hx    • Psychiatric Illness Neg Hx    • Thyroid Neg Hx        Social History     Socioeconomic History   • Marital status:      Spouse name: Not on file   • Number of children: Not on file   • Years of education: Not on file   • Highest education level: Not on file   Occupational History   • Not on file   Tobacco Use   • Smoking status: Never Smoker   • Smokeless tobacco: Never Used   Vaping Use   • Vaping Use: Never used   Substance and Sexual Activity   • Alcohol use: Yes     Alcohol/week: 0.0 oz     Comment: 2 on the weekends   • Drug use: No   • Sexual activity: Yes     Partners: Male   Other Topics Concern   • Not on file   Social History Narrative   • Not on file     Social Determinants of Health     Financial Resource Strain: Unknown   • Difficulty of Paying Living Expenses: Patient refused   Food Insecurity: Unknown   • Worried About Running Out of Food in the Last Year: Patient refused   • Ran Out of Food in the Last Year: Patient refused   Transportation Needs: Unknown   • Lack of Transportation (Medical): Patient refused   • Lack of Transportation (Non-Medical): Patient refused   Physical Activity: Insufficiently Active   • Days of Exercise per Week: 4 days   • Minutes of Exercise per Session: 30 min   Stress: Stress Concern Present   • Feeling of Stress : To some extent   Social Connections: Unknown   • Frequency of Communication with Friends and Family: Patient refused   • Frequency of Social Gatherings with Friends and Family: Patient refused   • Attends [employer] Services: Patient refused   • Active Member of Clubs or Organizations: Patient refused   • Attends Club or Organization Meetings: Patient refused   • Marital Status:  Patient refused   Intimate Partner Violence: Not on file   Housing Stability: Unknown   • Unable to Pay for Housing in the Last Year: Patient refused   • Number of Places Lived in the Last Year: Not on file   • Unstable Housing in the Last Year: Patient refused       Patient Active Problem List    Diagnosis Date Noted   • Cough 05/11/2021   • Hemorrhoids 05/11/2021   • Type 2 diabetes mellitus (HCC) 06/22/2020   • S/P cholecystectomy 01/23/2019   • Obesity (BMI 30-39.9) 12/21/2017   • Hyperlipidemia 12/19/2016   • Elevated liver enzymes 12/19/2016   • Bilateral tinnitus 12/05/2016   • MANISH (obstructive sleep apnea) 10/22/2012   • Family history of diabetes mellitus 10/22/2012   • Vitamin D insufficiency 10/22/2012   • Hyperprolactinemia (HCC) 05/07/2012   • Pituitary lesion (HCC) 05/07/2012   • Androgen excess, 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 90 Tablet 3   • metFORMIN ER 1000 MG TABLET SR 24 HR TAKE 2 TABLETS BY MOUTH WITH SUPPER 180 Tablet 0   • atorvastatin (LIPITOR) 10 MG Tab Take 1 tablet by mouth once daily 30 Tablet 0   • ipratropium (ATROVENT) 0.06 % Solution Administer 2 Sprays into affected nostril(S) in the morning, at noon, and at bedtime. 15 mL 0   • colestipol (COLESTID) 1 GM Tab Take 1 tablet by mouth every day. 90 tablet 3   • benzonatate (TESSALON) 100 MG Cap Take 1 capsule by mouth 3 times a day as needed for Cough. 60 capsule 0   • fluticasone (FLONASE) 50 MCG/ACT nasal spray Spray 1 Spray in nose every day. 16 g 0   • Cholecalciferol (VITAMIN D PO) Take 2,000 Int'l Units by mouth every day.     • Multiple Vitamin (MULTI-VITAMIN DAILY PO) Take  by mouth.       Current Facility-Administered  "Medications   Medication Dose Route Frequency Provider Last Rate Last Admin   • eucalyptus/peppermint oil (PONARIS NASAL) nasal emollient 0.5 mL  10 Drop Nasal Q6HRS PRN AZUL SanchezAMP         Allergies   Allergen Reactions   • Codeine Nausea   • Pcn [Penicillins] Hives       Review of Systems   Constitutional: Negative for fever, chills.   HENT: Negative for congestion.    Eyes: Negative for pain.    Respiratory: Positive for cough - lisinopril  Cardiovascular: Negative for leg swelling.   Gastrointestinal: Negative for nausea, vomiting.   Genitourinary: Negative for dysuria.   Skin: Negative for rash.   Neurological: Negative for dizziness.  Endo/Heme/Allergies: Does not bleed easily.   Psychiatric/Behavioral: Negative for depression.  The patient is not nervous/anxious.      Objective:     /84 (BP Location: Right arm, Patient Position: Sitting, BP Cuff Size: Adult)   Pulse 64   Temp 36.4 °C (97.6 °F) (Temporal)   Resp 16   Ht 1.626 m (5' 4\")   Wt 82.6 kg (182 lb)   SpO2 96%   BMI 31.24 kg/m²   Body mass index is 31.24 kg/m².  Wt Readings from Last 4 Encounters:   03/08/22 82.6 kg (182 lb)   08/24/21 82.8 kg (182 lb 9.6 oz)   05/11/21 85.5 kg (188 lb 6.4 oz)   01/26/21 83 kg (183 lb)       Physical Exam:  Constitutional: Well-developed and well-nourished. Not diaphoretic. No distress.   Skin: Skin is warm and dry. No rash noted.  Head: Atraumatic without lesions.  Eyes: Conjunctivae and extraocular motions are normal. Pupils are equal, round, and reactive to light. No scleral icterus.   Ears:  External ears unremarkable. Tympanic membranes clear and intact.  Mouth/Throat: Dentition is good. Tongue normal. Oropharynx is clear and moist. Posterior pharynx without erythema or exudates.  Neck: Supple, trachea midline. Normal range of motion. No thyromegaly present. No lymphadenopathy--cervical or supraclavicular.  Cardiovascular: Regular rate and rhythm, S1 and S2 without murmur, rubs, or " gallops.  Lungs: Normal inspiratory effort, CTA bilaterally, no wheezes/rhonchi/rales  Abdomen: Soft, non tender, and without distention. Active bowel sounds in all four quadrants. No rebound, guarding, masses or HSM.  Extremities: No cyanosis, clubbing, erythema, nor edema. Distal pulses intact and symmetric.   Musculoskeletal: All major joints AROM full in all directions without pain.  Neurological: Alert and oriented x 3. DTRs 2+/3 and symmetric. No cranial nerve deficit. 5/5 myotomes. Sensation intact.   Psychiatric:  Behavior, mood, and affect are appropriate.    Assessment and Plan:     1. Encounter for screening mammogram for malignant neoplasm of breast     2. Wellness examination       HCM:  Up to date   Labs per orders  Immunizations per orders  Patient counseled about skin care, diet, supplements, prenatal vitamins, safe sex and exercise.      Follow-up: Return in about 6 months (around 9/8/2022) for f/u DM, F/u labs.

## 2022-03-14 RX ORDER — LOSARTAN POTASSIUM 25 MG/1
TABLET ORAL
Qty: 90 TABLET | Refills: 0 | OUTPATIENT
Start: 2022-03-14

## 2022-03-17 LAB — PROLACTIN SERPL-MCNC: 38.7 NG/ML (ref 4.8–23.3)

## 2022-03-28 RX ORDER — ATORVASTATIN CALCIUM 10 MG/1
TABLET, FILM COATED ORAL
Qty: 90 TABLET | Refills: 3 | Status: SHIPPED | OUTPATIENT
Start: 2022-03-28 | End: 2023-01-19 | Stop reason: SDUPTHER

## 2022-06-20 ENCOUNTER — RESEARCH ENCOUNTER (OUTPATIENT)
Dept: RESEARCH | Facility: WORKSITE | Age: 62
End: 2022-06-20
Payer: COMMERCIAL

## 2022-06-20 DIAGNOSIS — Z00.6 RESEARCH STUDY PATIENT: ICD-10-CM

## 2022-06-27 RX ORDER — MONTELUKAST SODIUM 4 MG/1
TABLET, CHEWABLE ORAL
Qty: 30 TABLET | Refills: 0 | Status: SHIPPED | OUTPATIENT
Start: 2022-06-27 | End: 2022-07-06 | Stop reason: SDUPTHER

## 2022-07-06 ENCOUNTER — PATIENT MESSAGE (OUTPATIENT)
Dept: MEDICAL GROUP | Facility: PHYSICIAN GROUP | Age: 62
End: 2022-07-06
Payer: COMMERCIAL

## 2022-07-06 RX ORDER — MONTELUKAST SODIUM 4 MG/1
1 TABLET, CHEWABLE ORAL 2 TIMES DAILY
Qty: 180 TABLET | Refills: 0 | Status: SHIPPED | OUTPATIENT
Start: 2022-07-06 | End: 2023-01-26

## 2022-07-28 ENCOUNTER — OFFICE VISIT (OUTPATIENT)
Dept: URGENT CARE | Facility: CLINIC | Age: 62
End: 2022-07-28
Payer: COMMERCIAL

## 2022-07-28 VITALS
WEIGHT: 189 LBS | DIASTOLIC BLOOD PRESSURE: 86 MMHG | HEART RATE: 90 BPM | RESPIRATION RATE: 16 BRPM | TEMPERATURE: 98.6 F | BODY MASS INDEX: 33.49 KG/M2 | SYSTOLIC BLOOD PRESSURE: 150 MMHG | OXYGEN SATURATION: 93 % | HEIGHT: 63 IN

## 2022-07-28 DIAGNOSIS — R05.9 COUGH: ICD-10-CM

## 2022-07-28 DIAGNOSIS — U07.1 COVID-19: ICD-10-CM

## 2022-07-28 DIAGNOSIS — J02.9 SORE THROAT: ICD-10-CM

## 2022-07-28 LAB
APOB+LDLR+PCSK9 GENE MUT ANL BLD/T: NOT DETECTED
BRCA1+BRCA2 DEL+DUP + FULL MUT ANL BLD/T: NOT DETECTED
EXTERNAL QUALITY CONTROL: ABNORMAL
INT CON NEG: NORMAL
INT CON POS: NORMAL
MLH1+MSH2+MSH6+PMS2 GN DEL+DUP+FUL M: NOT DETECTED
S PYO AG THROAT QL: NEGATIVE
SARS-COV+SARS-COV-2 AG RESP QL IA.RAPID: POSITIVE

## 2022-07-28 PROCEDURE — 87426 SARSCOV CORONAVIRUS AG IA: CPT | Performed by: NURSE PRACTITIONER

## 2022-07-28 PROCEDURE — 99214 OFFICE O/P EST MOD 30 MIN: CPT | Mod: CS | Performed by: NURSE PRACTITIONER

## 2022-07-28 PROCEDURE — 87880 STREP A ASSAY W/OPTIC: CPT | Performed by: NURSE PRACTITIONER

## 2022-07-28 RX ORDER — LIDOCAINE HYDROCHLORIDE 20 MG/ML
5 SOLUTION OROPHARYNGEAL 4 TIMES DAILY PRN
Qty: 120 ML | Refills: 0 | Status: SHIPPED | OUTPATIENT
Start: 2022-07-28 | End: 2022-09-26

## 2022-07-28 RX ORDER — BENZONATATE 100 MG/1
100 CAPSULE ORAL 3 TIMES DAILY PRN
Qty: 60 CAPSULE | Refills: 0 | Status: SHIPPED | OUTPATIENT
Start: 2022-07-28 | End: 2024-03-22 | Stop reason: CLARIF

## 2022-07-28 ASSESSMENT — FIBROSIS 4 INDEX: FIB4 SCORE: 1.07

## 2022-07-28 NOTE — PROGRESS NOTES
Chief Complaint   Patient presents with   • Sinus Problem     X 5 days, nasal congestion, runny nose, bilateral ear pain, sore throat, hard to swallow.  Home test negative for covid       HISTORY OF PRESENT ILLNESS: Patient is a pleasant 61 y.o. female with a history of diabetes who presents today due to symptoms which started 5 days ago. Pt reports a dry cough, sinus pressure, sore throat, ear pressure, fatigue, malaise, and body aches. Denies chest pain, shortness of breath, or wheezing.  Denies h/o asthma/copd/CAP. Has tried OTC cold medications without significant relief of symptoms. No recent ABX use. No other aggravating or alleviating factors. Reports no known exposure to COVID-19, has been vaccinated.       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 180 Tablet 0   • atorvastatin (LIPITOR) 10 MG Tab Take 1 tablet by mouth once daily 90 Tablet 3   • losartan (COZAAR) 25 MG Tab Take 1 Tablet by mouth every day. 90 Tablet 3   • metFORMIN ER 1000 MG TABLET SR 24 HR TAKE 2 TABLETS BY MOUTH WITH SUPPER  180 Tablet 0   • ipratropium (ATROVENT) 0.06 % Solution Administer 2 Sprays into affected nostril(S) in the morning, at noon, and at bedtime. 15 mL 0   • fluticasone (FLONASE) 50 MCG/ACT nasal spray Spray 1 Spray in nose every day. 16 g 0   • Cholecalciferol (VITAMIN D PO) Take 2,000 Int'l Units by mouth every day.     • Multiple Vitamin (MULTI-VITAMIN DAILY PO) Take  by mouth.       Current Facility-Administered Medications Ordered in Epic   Medication Dose Route Frequency Provider Last Rate Last Admin   • eucalyptus/peppermint oil (PONARIS NASAL) nasal emollient 0.5 mL  10 Drop Nasal Q6HRS PRN AZUL SanchezAShan-ARIANA.           Past Medical History:   Diagnosis Date   • Chickenpox    • Hirsutism     elevated testosterone   • Hyperprolactinemia (HCC)     minimal = 32   • Melanoma (HCC)    • Mumps    • Pituitary abnormality (HCC)     2mm cyst or microadenoma   • Tonsillitis        Social History     Tobacco Use   • Smoking status: Never Smoker   • Smokeless tobacco: Never Used   Vaping Use   • Vaping Use: Never used   Substance Use Topics   • Alcohol use: Yes     Alcohol/week: 0.0 oz     Comment: 2 on the weekends   • Drug use: No       Family Status   Relation Name Status   • Mo     • Fa     • Sis  Alive   • Bro  Alive   • Dexter  Alive   • Neg Hx  (Not Specified)     Family History   Problem Relation Age of Onset   • Diabetes Mother    • Diabetes Father    • Heart Disease Father    • No Known Problems Sister    • No Known Problems Brother    • No Known Problems Daughter    • Cancer Neg Hx    • Hypertension Neg Hx    • Hyperlipidemia Neg Hx    • Stroke Neg Hx    • Psychiatric Illness Neg Hx    • Thyroid Neg Hx        ROS:  Review of Systems   Constitutional: Positive for fatigue, malaise. Negative for weight loss.  HENT: Positive for rhinitis, sore throat. Negative for ear pain, nosebleeds, and neck pain.    Eyes: Negative for vision changes.   Cardiovascular: Negative for chest pain,  "palpitations, orthopnea and leg swelling.   Respiratory: Positive for cough without sputum production. Negative for shortness of breath and wheezing.   Gastrointestinal: Negative for abdominal pain, vomiting or diarrhea.   Skin: Negative for rash, diaphoresis.     Exam:  BP (!) 150/86 (BP Location: Left arm, Patient Position: Sitting, BP Cuff Size: Large adult)   Pulse 90   Temp 37 °C (98.6 °F) (Temporal)   Resp 16   Ht 1.6 m (5' 3\")   Wt 85.7 kg (189 lb)   SpO2 93%   General: well-nourished, well-developed female in NAD  Head: normocephalic, atraumatic  Eyes: PERRLA, EOM within normal limits, no conjunctival injection, no scleral icterus, visual fields and acuity grossly intact.  Ears: normal shape and symmetry, no tenderness, no discharge. External canals are without any significant edema or erythema. Tympanic membranes are without any inflammation, no effusion. Gross auditory acuity is intact.  Nose: symmetrical without tenderness, mild discharge, erythema present bilateral nares.  Mouth/Throat: reasonable hygiene, no exudates or tonsillar enlargement. Mild erythema present.   Neck: no masses, range of motion within normal limits, no tracheal deviation.  Lymph: mild cervical adenopathy. No supraclavicular adenopathy.   Neuro: alert and oriented. Cranial nerves 1-12 grossly intact.   Cardiovascular: regular rate and rhythm without murmurs, rubs, or gallops. No edema.   Pulmonary: no distress. Chest is symmetrical with respiration. No wheezes, crackles, or rhonchi.   Musculoskeletal: appropriate muscle tone, gait is stable.  Skin: warm, dry, intact, no clubbing, no cyanosis.   Psych: appropriate mood, affect, judgement.       POC strep negative    POC COVID positive      Assessment/Plan:  1. COVID-19     2. Sore throat  POCT Rapid Strep A    POCT SARS-COV Antigen BRYN (Symptomatic only)    lidocaine (XYLOCAINE) 2 % Solution   3. Cough  benzonatate (TESSALON) 100 MG Cap             Discussed symptoms viral, " antiviral offered but patient politely declined. Home isolation advised. Discussed natural progression and sx care.  Tessalon and lidocaine as needed.  Rest, increase fluids, hand and respiratory hygiene. May take OTC medications as directed for symptom relief. STRICT ER precautions.   Supportive care, differential diagnoses, and indications for immediate follow-up discussed with patient.   Pathogenesis of diagnosis discussed including typical length and natural progression.  Instructed to return to clinic or nearest emergency department for any change in condition, further concerns, or worsening of symptoms.  Patient states understanding of the plan of care and discharge instructions.          SHONDA Lombardo.

## 2022-08-01 DIAGNOSIS — E78.5 HYPERLIPIDEMIA, UNSPECIFIED HYPERLIPIDEMIA TYPE: ICD-10-CM

## 2022-08-01 DIAGNOSIS — E11.29 TYPE 2 DIABETES MELLITUS WITH MICROALBUMINURIA, WITHOUT LONG-TERM CURRENT USE OF INSULIN (HCC): ICD-10-CM

## 2022-08-01 DIAGNOSIS — R80.9 TYPE 2 DIABETES MELLITUS WITH MICROALBUMINURIA, WITHOUT LONG-TERM CURRENT USE OF INSULIN (HCC): ICD-10-CM

## 2022-08-19 LAB
ALBUMIN SERPL-MCNC: 4.6 G/DL (ref 3.8–4.8)
ALBUMIN/CREAT UR: 82 MG/G CREAT (ref 0–29)
ALBUMIN/GLOB SERPL: 1.9 {RATIO} (ref 1.2–2.2)
ALP SERPL-CCNC: 70 IU/L (ref 44–121)
ALT SERPL-CCNC: 51 IU/L (ref 0–32)
AST SERPL-CCNC: 45 IU/L (ref 0–40)
BILIRUB SERPL-MCNC: 0.8 MG/DL (ref 0–1.2)
BUN SERPL-MCNC: 13 MG/DL (ref 8–27)
BUN/CREAT SERPL: 19 (ref 12–28)
CALCIUM SERPL-MCNC: 9.7 MG/DL (ref 8.7–10.3)
CHLORIDE SERPL-SCNC: 101 MMOL/L (ref 96–106)
CHOLEST SERPL-MCNC: 129 MG/DL (ref 100–199)
CO2 SERPL-SCNC: 23 MMOL/L (ref 20–29)
CREAT SERPL-MCNC: 0.68 MG/DL (ref 0.57–1)
CREAT UR-MCNC: 118.3 MG/DL
EGFRCR-CYS SERPLBLD CKD-EPI 2021: 98 ML/MIN/1.73
ERYTHROCYTE [DISTWIDTH] IN BLOOD BY AUTOMATED COUNT: 12.3 % (ref 11.7–15.4)
GLOBULIN SER CALC-MCNC: 2.4 G/DL (ref 1.5–4.5)
GLUCOSE SERPL-MCNC: 156 MG/DL (ref 65–99)
HBA1C MFR BLD: 7.5 % (ref 4.8–5.6)
HCT VFR BLD AUTO: 43.2 % (ref 34–46.6)
HDLC SERPL-MCNC: 49 MG/DL
HGB BLD-MCNC: 14.9 G/DL (ref 11.1–15.9)
LABORATORY COMMENT REPORT: NORMAL
LDLC SERPL CALC-MCNC: 56 MG/DL (ref 0–99)
MCH RBC QN AUTO: 32.2 PG (ref 26.6–33)
MCHC RBC AUTO-ENTMCNC: 34.5 G/DL (ref 31.5–35.7)
MCV RBC AUTO: 93 FL (ref 79–97)
MICROALBUMIN UR-MCNC: 97.1 UG/ML
NRBC BLD AUTO-RTO: NORMAL %
PLATELET # BLD AUTO: 297 X10E3/UL (ref 150–450)
POTASSIUM SERPL-SCNC: 4.2 MMOL/L (ref 3.5–5.2)
PROT SERPL-MCNC: 7 G/DL (ref 6–8.5)
RBC # BLD AUTO: 4.63 X10E6/UL (ref 3.77–5.28)
SODIUM SERPL-SCNC: 141 MMOL/L (ref 134–144)
TRIGL SERPL-MCNC: 137 MG/DL (ref 0–149)
VLDLC SERPL CALC-MCNC: 24 MG/DL (ref 5–40)
WBC # BLD AUTO: 5 X10E3/UL (ref 3.4–10.8)

## 2022-09-26 ENCOUNTER — OFFICE VISIT (OUTPATIENT)
Dept: MEDICAL GROUP | Facility: PHYSICIAN GROUP | Age: 62
End: 2022-09-26
Payer: COMMERCIAL

## 2022-09-26 VITALS
TEMPERATURE: 98.6 F | BODY MASS INDEX: 31.71 KG/M2 | WEIGHT: 179 LBS | SYSTOLIC BLOOD PRESSURE: 134 MMHG | DIASTOLIC BLOOD PRESSURE: 70 MMHG | HEIGHT: 63 IN | OXYGEN SATURATION: 100 % | HEART RATE: 61 BPM

## 2022-09-26 DIAGNOSIS — E11.29 TYPE 2 DIABETES MELLITUS WITH MICROALBUMINURIA, WITHOUT LONG-TERM CURRENT USE OF INSULIN (HCC): Primary | ICD-10-CM

## 2022-09-26 DIAGNOSIS — E66.9 OBESITY (BMI 30-39.9): ICD-10-CM

## 2022-09-26 DIAGNOSIS — Z23 NEED FOR VACCINATION: ICD-10-CM

## 2022-09-26 DIAGNOSIS — R74.8 ELEVATED LIVER ENZYMES: ICD-10-CM

## 2022-09-26 DIAGNOSIS — R80.9 TYPE 2 DIABETES MELLITUS WITH MICROALBUMINURIA, WITHOUT LONG-TERM CURRENT USE OF INSULIN (HCC): Primary | ICD-10-CM

## 2022-09-26 PROCEDURE — 90471 IMMUNIZATION ADMIN: CPT | Performed by: FAMILY MEDICINE

## 2022-09-26 PROCEDURE — 90677 PCV20 VACCINE IM: CPT | Performed by: FAMILY MEDICINE

## 2022-09-26 PROCEDURE — 99214 OFFICE O/P EST MOD 30 MIN: CPT | Mod: 25 | Performed by: FAMILY MEDICINE

## 2022-09-26 ASSESSMENT — ENCOUNTER SYMPTOMS
FEVER: 0
SHORTNESS OF BREATH: 0
CHILLS: 0

## 2022-09-26 ASSESSMENT — FIBROSIS 4 INDEX: FIB4 SCORE: 1.34

## 2022-09-26 NOTE — PROGRESS NOTES
"Subjective:     CC: f/u labs    HPI:   Mable presents today with    Problem   Type 2 Diabetes Mellitus (Hcc)    This is a chronic condition. Diagnosed 6/2020. She reports she has tweaked some things in her diet.   Current medications:  Insulin: -  Biguanide: metformin ER 1000 mg daily  GLP1-RA: -  SGLT-2i: -  DPP4-I: -  TZD: -  Danae: -   Sulfonyluria: -    Last A1c: 7.5% (9/2022); 6.8% (8/2021); 7.8% (4/2021)  Last Microalb/Cr ratio: 82 (9/2022)  Fasting sugars: n/a  Last diabetic foot exam: 9/2022  Last retinal eye exam: requesting records  ACEi/ARB: lisinopril 5 mg  Statin: atorvastatin 10 mg  Aspirin: recommended  Concomitant HTN: no - at goal  Nightly foot checks: yes       Elevated Liver Enzymes    Chronic. Since 2012    EtOH: 2 shots/week    Meds  Atorvastatin - started 2020  Losartan - started 2020  Metformin  - started 2020    Labs  HCV neg (2020)  Lipids wnl (2022)  A1c 7.5% (2022)  Serum albumin wnl (2022)  CBC wnl (2022)           Health Maintenance: Completed    ROS:  Review of Systems   Constitutional:  Negative for chills and fever.   Respiratory:  Negative for shortness of breath.    Cardiovascular:  Negative for chest pain.     Objective:     Exam:  /70 (BP Location: Right arm, Patient Position: Sitting, BP Cuff Size: Adult)   Pulse 61   Temp 37 °C (98.6 °F) (Temporal)   Ht 1.6 m (5' 3\")   Wt 81.2 kg (179 lb)   SpO2 100%   BMI 31.71 kg/m²  Body mass index is 31.71 kg/m².    Physical Exam  Constitutional:       Appearance: Normal appearance.   Cardiovascular:      Rate and Rhythm: Normal rate and regular rhythm.      Heart sounds: Normal heart sounds.   Pulmonary:      Effort: Pulmonary effort is normal.      Breath sounds: Normal breath sounds.   Musculoskeletal:      Cervical back: Normal range of motion and neck supple.   Feet:      Comments: Diabetic foot exam: No lesions or calluses noted. 2+ pedal pulses. Sensation intact with 10 out of 10 on monofilament test.  Neurological:      " Mental Status: She is alert.       Assessment & Plan:     62 y.o. female with the following -     1. Type 2 diabetes mellitus with microalbuminuria, without long-term current use of insulin (HCC)  Chronic, uncontrolled. A1c is over 7.0%. We will increase her metformin.  - Increase metformin ER to 2000 mg daily  - Diabetic Monofilament Lower Extremity Exam  - Referral to Nutrition Services    2. Elevated liver enzymes  Chronic, stable.  Since 2012 she has had mild transaminitis.  Most of the work-up has already been completed and unremarkable.  However, we have not screened for hemochromatosis or hepatitis B so we will do those labs.  We will also get an ultrasound of her liver as I do suspect there is a component of fatty infiltration of the liver based on her history of hypertension, hyperlipidemia, obesity, and type 2 diabetes.  - IRON/TOTAL IRON BIND; Future  - FERRITIN; Future  - HEP B SURFACE AB; Future  - HEP B SURFACE ANTIGEN; Future  - HEP B CORE AB TOTAL; Future  - US-RUQ; Future    3. Obesity (BMI 30-39.9)  Chronic, stable.  She has lost 10 pounds since July but is endeavoring to lose more.  She would like a referral to a dietitian to help her with this.  - Referral to Nutrition Services    4. Need for vaccination  - Pneumococcal Conjugate Vaccine 20-Valent (19 yrs+)    Referral for genetic research was offered. Patient is a participant.    Return in about 4 weeks (around 10/24/2022) for F/u labs, F/u BP.    Please note that this dictation was created using voice recognition software. I have made every reasonable attempt to correct obvious errors, but I expect that there are errors of grammar and possibly content that I did not discover before finalizing the note.

## 2022-12-12 ENCOUNTER — HOSPITAL ENCOUNTER (OUTPATIENT)
Dept: RADIOLOGY | Facility: MEDICAL CENTER | Age: 62
End: 2022-12-12
Attending: FAMILY MEDICINE
Payer: COMMERCIAL

## 2022-12-12 DIAGNOSIS — R74.8 ELEVATED LIVER ENZYMES: ICD-10-CM

## 2022-12-12 PROCEDURE — 76705 ECHO EXAM OF ABDOMEN: CPT

## 2023-01-14 LAB
FERRITIN SERPL-MCNC: 125 NG/ML (ref 15–150)
HBV CORE AB SERPL QL IA: NEGATIVE
HBV SURFACE AB SER QL: NON REACTIVE
HBV SURFACE AG SERPL QL IA: NEGATIVE
IRON SATN MFR SERPL: 28 % (ref 15–55)
IRON SERPL-MCNC: 109 UG/DL (ref 27–139)
TIBC SERPL-MCNC: 384 UG/DL (ref 250–450)
UIBC SERPL-MCNC: 275 UG/DL (ref 118–369)

## 2023-01-19 ENCOUNTER — OFFICE VISIT (OUTPATIENT)
Dept: MEDICAL GROUP | Facility: PHYSICIAN GROUP | Age: 63
End: 2023-01-19
Payer: COMMERCIAL

## 2023-01-19 VITALS
TEMPERATURE: 98.6 F | DIASTOLIC BLOOD PRESSURE: 66 MMHG | WEIGHT: 176 LBS | SYSTOLIC BLOOD PRESSURE: 138 MMHG | HEART RATE: 60 BPM | HEIGHT: 63 IN | OXYGEN SATURATION: 95 % | BODY MASS INDEX: 31.18 KG/M2

## 2023-01-19 DIAGNOSIS — R80.9 TYPE 2 DIABETES MELLITUS WITH MICROALBUMINURIA, WITHOUT LONG-TERM CURRENT USE OF INSULIN (HCC): ICD-10-CM

## 2023-01-19 DIAGNOSIS — R03.0 ELEVATED BLOOD PRESSURE READING: ICD-10-CM

## 2023-01-19 DIAGNOSIS — Z13.79 GENETIC SCREENING: ICD-10-CM

## 2023-01-19 DIAGNOSIS — E11.29 TYPE 2 DIABETES MELLITUS WITH MICROALBUMINURIA, WITHOUT LONG-TERM CURRENT USE OF INSULIN (HCC): ICD-10-CM

## 2023-01-19 DIAGNOSIS — E23.7 PITUITARY LESION (HCC): ICD-10-CM

## 2023-01-19 DIAGNOSIS — R74.8 ELEVATED LIVER ENZYMES: Primary | ICD-10-CM

## 2023-01-19 LAB
HBA1C MFR BLD: 7.1 % (ref 0–5.6)
INT CON NEG: ABNORMAL
INT CON POS: ABNORMAL

## 2023-01-19 PROCEDURE — 83036 HEMOGLOBIN GLYCOSYLATED A1C: CPT | Performed by: FAMILY MEDICINE

## 2023-01-19 PROCEDURE — 99214 OFFICE O/P EST MOD 30 MIN: CPT | Performed by: FAMILY MEDICINE

## 2023-01-19 RX ORDER — ATORVASTATIN CALCIUM 10 MG/1
10 TABLET, FILM COATED ORAL DAILY
Qty: 90 TABLET | Refills: 3 | Status: SHIPPED | OUTPATIENT
Start: 2023-01-19 | End: 2024-03-22 | Stop reason: CLARIF

## 2023-01-19 RX ORDER — LOSARTAN POTASSIUM 25 MG/1
25 TABLET ORAL DAILY
Qty: 90 TABLET | Refills: 0 | Status: SHIPPED | OUTPATIENT
Start: 2023-01-19

## 2023-01-19 ASSESSMENT — PATIENT HEALTH QUESTIONNAIRE - PHQ9: CLINICAL INTERPRETATION OF PHQ2 SCORE: 0

## 2023-01-19 ASSESSMENT — ENCOUNTER SYMPTOMS
CHILLS: 0
FEVER: 0
SHORTNESS OF BREATH: 0

## 2023-01-19 ASSESSMENT — FIBROSIS 4 INDEX: FIB4 SCORE: 1.34

## 2023-01-19 NOTE — PATIENT INSTRUCTIONS
Lifestyle Modifications for Fatty Infiltration of the Liver:  Weight loss: aim for 7-10% of body weight (12-17 lbs)  General nutrition: low-fat to moderate-fat, low carbohydrate, or Mediterranean diet  Fructose intake: avoid fructose-containing beverages or food  Physical activity: 150-200 minutes of moderate (slow jogging, brisk walking, gardening, etc) to vigorous (running, fast cycling, fast swimming, etc) exercise per week  Alcohol intake: daily intake less than 1 alcoholic drink for women and less than 2 alcoholic drinks for men (drink = 12 oz beer 5% alc OR 5 oz wine 12% alc OR 1.5 oz 80-proof liquor)  Coffee drinking: no limitations     BLOOD PRESSURE  Our goal is for your blood pressure to remain under 140/90

## 2023-01-19 NOTE — PROGRESS NOTES
"Subjective:     CC: review labs, BP    HPI:   Mable presents today with    Problem   Type 2 Diabetes Mellitus (Hcc)    This is a chronic condition. Diagnosed 6/2020. She reports she has tweaked some things in her diet.   Current medications:  Insulin: -  Biguanide: metformin ER 2000 mg daily (sometimes 1/2 dose due to diarrhea)  GLP1-RA: -  SGLT-2i: -  DPP4-I: -  TZD: -  Danae: -   Sulfonyluria: -    Last A1c: 7.1% (1/2023); 7.5% (9/2022); 6.8% (8/2021); 7.8% (4/2021)  Last Microalb/Cr ratio: 82 (9/2022)  Fasting sugars: n/a  Last diabetic foot exam: 9/2022  Last retinal eye exam: requesting records  ACEi/ARB: lisinopril 5 mg  Statin: atorvastatin 10 mg  Aspirin: recommended  Concomitant HTN: no - at goal  Nightly foot checks: yes       Elevated Liver Enzymes    Chronic. Since 2012    EtOH: 1 every 2-3 weeks <-- 2 shots/week    Meds  Atorvastatin - started 2020  Losartan - started 2020  Metformin  - started 2020    Labs  HCV neg (2020)  Lipids wnl (2022)  A1c 7.5% (2022)  Serum albumin wnl (2022)  CBC wnl (2022)  HBV neg (2023)  Iron panel wnl (2023)    Imaging  US - fatty infiltration of liver (2022)     Pituitary Lesion (Hcc) (Resolved)    Diagnosed at 22-22 yo at [facility]         Health Maintenance: Completed    ROS:  Review of Systems   Constitutional:  Negative for chills and fever.   Respiratory:  Negative for shortness of breath.    Cardiovascular:  Negative for chest pain.     Objective:     Exam:  /66 (BP Location: Right arm, Patient Position: Sitting, BP Cuff Size: Adult)   Pulse 60   Temp 37 °C (98.6 °F) (Temporal)   Ht 1.6 m (5' 3\")   Wt 79.8 kg (176 lb)   SpO2 95%   BMI 31.18 kg/m²  Body mass index is 31.18 kg/m².    Physical Exam  Constitutional:       Appearance: Normal appearance.   Cardiovascular:      Rate and Rhythm: Normal rate and regular rhythm.      Heart sounds: Normal heart sounds.   Pulmonary:      Effort: Pulmonary effort is normal.      Breath sounds: Normal breath sounds. "   Musculoskeletal:      Cervical back: Normal range of motion and neck supple.   Neurological:      Mental Status: She is alert.       Assessment & Plan:     62 y.o. female with the following -     1. Elevated liver enzymes  Chronic, stable.  Present since 2012.  Work-up so far has been unremarkable-HCV, HBV, iron panel, CBC, lipids, and albumin.  A1c is elevated due to uncontrolled diabetes.  Ultrasound shows fatty infiltration so this is likely nonalcoholic fatty infiltration of the liver.  There is the possibility that the atorvastatin, losartan, and metformin are contributing but these were all started long after the transaminitis started.  We discussed ways to help improve fatty infiltration the liver and we will also get her to the PRABHAKAR study.    2. Type 2 diabetes mellitus with microalbuminuria, without long-term current use of insulin (HCC)  Chronic, uncontrolled.  A1c is improved but still over 7.0%.  She does get pretty significant diarrhea with a max dose of metformin so we will add a second agent for better control to limit side effects.  - Metformin ER 1000 mg daily  - Sitagliptin 50 mg daily  - POCT Hemoglobin A1C    3. Genetic screening  PRABHAKAR study.  - Referral to Genetic Research Studies    4. Elevated blood pressure reading  Chronic, stable.  Intermittently she gets elevated blood pressure readings in the office today.  Initially it was above 140/90 but a repeat at the end of the visit was under 140/90.  Therefore, we will continue to monitor for now.    5. Pituitary lesion (HCC)  Resolved.  Recent MRI of the brain showed no pituitary lesion.    Referral for genetic research was offered. Patient is a participant.    Return in about 3 months (around 4/19/2023) for f/u DM, get A1c.    Please note that this dictation was created using voice recognition software. I have made every reasonable attempt to correct obvious errors, but I expect that there are errors of grammar and possibly content that I did  not discover before finalizing the note.

## 2023-01-26 RX ORDER — MONTELUKAST SODIUM 4 MG/1
TABLET, CHEWABLE ORAL
Qty: 180 TABLET | Refills: 0 | Status: SHIPPED | OUTPATIENT
Start: 2023-01-26 | End: 2023-01-30 | Stop reason: SDUPTHER

## 2024-01-16 ENCOUNTER — OFFICE VISIT (OUTPATIENT)
Dept: ENDOCRINOLOGY | Facility: MEDICAL CENTER | Age: 64
End: 2024-01-16
Attending: STUDENT IN AN ORGANIZED HEALTH CARE EDUCATION/TRAINING PROGRAM
Payer: COMMERCIAL

## 2024-01-16 VITALS
SYSTOLIC BLOOD PRESSURE: 147 MMHG | DIASTOLIC BLOOD PRESSURE: 69 MMHG | BODY MASS INDEX: 30.3 KG/M2 | HEIGHT: 63 IN | OXYGEN SATURATION: 96 % | HEART RATE: 60 BPM | WEIGHT: 171 LBS

## 2024-01-16 DIAGNOSIS — L68.0 HIRSUTISM: ICD-10-CM

## 2024-01-16 DIAGNOSIS — Z86.018 HISTORY OF PITUITARY ADENOMA: ICD-10-CM

## 2024-01-16 PROCEDURE — 3078F DIAST BP <80 MM HG: CPT | Performed by: STUDENT IN AN ORGANIZED HEALTH CARE EDUCATION/TRAINING PROGRAM

## 2024-01-16 PROCEDURE — 99205 OFFICE O/P NEW HI 60 MIN: CPT | Performed by: STUDENT IN AN ORGANIZED HEALTH CARE EDUCATION/TRAINING PROGRAM

## 2024-01-16 PROCEDURE — 3077F SYST BP >= 140 MM HG: CPT | Performed by: STUDENT IN AN ORGANIZED HEALTH CARE EDUCATION/TRAINING PROGRAM

## 2024-01-16 PROCEDURE — 99212 OFFICE O/P EST SF 10 MIN: CPT | Performed by: STUDENT IN AN ORGANIZED HEALTH CARE EDUCATION/TRAINING PROGRAM

## 2024-01-16 RX ORDER — SEMAGLUTIDE 0.68 MG/ML
INJECTION, SOLUTION SUBCUTANEOUS
COMMUNITY
Start: 2023-11-16

## 2024-01-16 RX ORDER — DAPAGLIFLOZIN 10 MG/1
TABLET, FILM COATED ORAL
COMMUNITY
Start: 2023-12-05

## 2024-01-16 RX ORDER — ROSUVASTATIN CALCIUM 20 MG/1
TABLET, COATED ORAL
COMMUNITY
Start: 2023-12-05

## 2024-01-16 RX ORDER — PROGESTERONE 100 MG/1
CAPSULE ORAL
COMMUNITY
Start: 2023-12-31

## 2024-01-16 RX ORDER — DEXAMETHASONE 1 MG
1 TABLET ORAL
Qty: 2 TABLET | Refills: 0 | Status: SHIPPED | OUTPATIENT
Start: 2024-01-16

## 2024-01-16 ASSESSMENT — FIBROSIS 4 INDEX: FIB4 SCORE: 1.36

## 2024-01-16 NOTE — PROGRESS NOTES
"New Patient Consult Note for Endocrinology  Referred by: Sherine Cleary M.D.    Reason for consult:   Pituitary microadenoma  Hirsutism    HPI:  Mable Dennison is a very pleasant 63 y.o. lady who is here for evaluation for pituitary microadenoma.     Pituitary microadenoma:  - patient has poor recollection of the history  - she believes that pituitary problems were noted at her 23 yo, was told that she had pituitary enlargement, elevated prolactin? was treated with bromocriptine x 2 years -> pituitary enlargement receded, therapy was stopped  - as per review Dr. Velez, prior endocrinologist, at the time of prolactin elevation she was on OCPs and had no galactorrhea; off bromocriptine  she did not have a fertility problems  -  on pituitary MRI in 2012 - microadenoma - 2 mm  - no features of Cushing's syndrome  - had recent pituitary MRI    Hirsutism:  - facial hair growth only, disturbing, non progressing  - since perimenopause  - never had it during her reproductive period  - accompanied by male pattern hair loss, but no acne, no deepening of the voice or clitoromegaly  - noted elevation of total testosterone since 2012 up to 149, DHEA-S was normal, mild hyperprolactinemia at 20 - 40s  - prior work up didn't show any ovarian lesions  - admits having DM and HTN, weight gain, but no cushingoid fat redistribution, purple striae, proximal muscle weakness  - was previously treated with spironolactone 100 mg/day, but can not recall having any benefit from the medication  - currently following \"hormonal doctor\" who prescribes her progesterone, which she likes as she sleeps better    Past Medical History:  Patient Active Problem List    Diagnosis Date Noted    Cough 05/11/2021    Hemorrhoids 05/11/2021    Type 2 diabetes mellitus (HCC) 06/22/2020    S/P cholecystectomy 01/23/2019    Obesity (BMI 30-39.9) 12/21/2017    Hyperlipidemia 12/19/2016    Elevated liver enzymes 12/19/2016    Bilateral tinnitus " 12/05/2016    MANISH (obstructive sleep apnea) 10/22/2012    Family history of diabetes mellitus 10/22/2012    Vitamin D insufficiency 10/22/2012    Hyperprolactinemia (HCC) 05/07/2012    Androgen excess, female post puberty 05/07/2012    Abnormal thyroid function test 11/15/2011    Seasonal allergies 11/15/2011    History of melanoma 10/19/2011    Menopausal symptoms 10/19/2011    Hirsutism 10/19/2011     Past Surgical History:  Past Surgical History:   Procedure Laterality Date    CHOLECYSTECTOMY      TONSILLECTOMY       Allergies:  Codeine and Pcn [penicillins]    Social History:  Social History     Socioeconomic History    Marital status:      Spouse name: Not on file    Number of children: Not on file    Years of education: Not on file    Highest education level: Not on file   Occupational History    Not on file   Tobacco Use    Smoking status: Never    Smokeless tobacco: Never   Vaping Use    Vaping Use: Never used   Substance and Sexual Activity    Alcohol use: Yes     Alcohol/week: 0.0 oz     Comment: 2 on the weekends    Drug use: Yes     Types: Marijuana, Oral     Comment: gummies    Sexual activity: Yes     Partners: Male   Other Topics Concern    Not on file   Social History Narrative    Not on file     Social Determinants of Health     Financial Resource Strain: Unknown (3/8/2022)    Overall Financial Resource Strain (CARDIA)     Difficulty of Paying Living Expenses: Patient refused   Food Insecurity: Unknown (3/8/2022)    Hunger Vital Sign     Worried About Running Out of Food in the Last Year: Patient refused     Ran Out of Food in the Last Year: Patient refused   Transportation Needs: Unknown (3/8/2022)    PRAPARE - Transportation     Lack of Transportation (Medical): Patient refused     Lack of Transportation (Non-Medical): Patient refused   Physical Activity: Insufficiently Active (3/8/2022)    Exercise Vital Sign     Days of Exercise per Week: 4 days     Minutes of Exercise per Session: 30 min    Stress: Stress Concern Present (3/8/2022)    [place] [employer] - Occupational Stress Questionnaire     Feeling of Stress : To some extent   Social Connections: Unknown (3/8/2022)    Social Connection and Isolation Panel [NHANES]     Frequency of Communication with Friends and Family: Patient refused     Frequency of Social Gatherings with Friends and Family: Patient refused     Attends [employer] Services: Patient refused     Active Member of Clubs or Organizations: Patient refused     Attends Club or Organization Meetings: Patient refused     Marital Status: Patient refused   Intimate Partner Violence: Not on file   Housing Stability: Unknown (3/8/2022)    Housing Stability Vital Sign     Unable to Pay for Housing in the Last Year: Patient refused     Number of Places Lived in the Last Year: Not on file     Unstable Housing in the Last Year: Patient refused       Family History:  Family History   Problem Relation Age of Onset    Diabetes Mother     Diabetes Father     Heart Disease Father     No Known Problems Sister     No Known Problems Brother     No Known Problems Daughter     Cancer Neg Hx     Hypertension Neg Hx     Hyperlipidemia Neg Hx     Stroke Neg Hx     Psychiatric Illness Neg Hx     Thyroid Neg Hx     Ovarian Cancer Neg Hx     Tubal Cancer Neg Hx     Peritoneal Cancer Neg Hx     Colorectal Cancer Neg Hx     Breast Cancer Neg Hx      Medications:  Current Outpatient Medications:     colestipol (COLESTID) 1 GM Tab, Take 1 tablet by mouth twice daily for 90 days, Disp: 180 Tablet, Rfl: 0    SITagliptin (JANUVIA) 50 MG Tab, Take 1 Tablet by mouth every day., Disp: 30 Tablet, Rfl: 2    atorvastatin (LIPITOR) 10 MG Tab, Take 1 Tablet by mouth every day., Disp: 90 Tablet, Rfl: 3    losartan (COZAAR) 25 MG Tab, Take 1 Tablet by mouth every day., Disp: 90 Tablet, Rfl: 0    metFORMIN ER 1000 MG TABLET SR 24 HR, TAKE 2 TABLETS BY MOUTH WITH SUPPER, Disp: 180 Tablet, Rfl: 1    benzonatate  "(TESSALON) 100 MG Cap, Take 1 Capsule by mouth 3 times a day as needed for Cough., Disp: 60 Capsule, Rfl: 0    ipratropium (ATROVENT) 0.06 % Solution, Administer 2 Sprays into affected nostril(S) in the morning, at noon, and at bedtime., Disp: 15 mL, Rfl: 0    fluticasone (FLONASE) 50 MCG/ACT nasal spray, Spray 1 Spray in nose every day., Disp: 16 g, Rfl: 0    Cholecalciferol (VITAMIN D PO), Take 2,000 Int'l Units by mouth every day., Disp: , Rfl:     Multiple Vitamin (MULTI-VITAMIN DAILY PO), Take  by mouth., Disp: , Rfl:     Current Facility-Administered Medications:     eucalyptus/peppermint oil (PONARIS NASAL) nasal emollient 0.5 mL, 10 Drop, Nasal, Q6HRS PRN, Kranthi Robbins P.A.-C.    Physical Examination:   Vital signs: BP (!) 147/69 (BP Location: Right arm, Patient Position: Sitting, BP Cuff Size: Adult)   Pulse 60   Ht 1.6 m (5' 3\")   Wt 77.6 kg (171 lb)   SpO2 96%   BMI 30.29 kg/m²   General: No distress, cooperative, well dressed and well nourished. No cushingoid features.   Eyes: No scleral icterus or discharge  ENMT: Normal on external inspection of nose, lips, No nasal drainage   Neck: No abnormal masses on inspection  Resp: Normal effort  CVS: Regular rate and rhythm  Extremities: No edema bilateral extremities  Neuro: Alert and oriented  Skin: No rash, No Ulcers. No visible hirsutism as patient is taking care of it.   Psych: Normal mood and affect    Labs:  - reviewed      4/21/23:  Total testosterone - 86 (3- 67)  Free testosterone - 0.9 (0.0 - 4.2)  Estradiol - <5  Progesterone < 0.1  LH - 28  FSH - 39.1  IGF-1 - 78  TSH - 0.716  fT4 - 1.39    10/20/23:  Total testosterone - 34 (3- 67)  Free testosterone - 0.4 (0.0 - 4.2)  DHEA - 32 (21 - 402)  Estradiol - 34.5  Progesterone - 7.5  LH - 20.2  FSH - 25.9  IGF-1 - 79  TSH - 0.695  fT4 - 1.25    11/30/24:  HbA1C - 6.0  TSH - 0.835  fT4 - 1.46  Vit D - 21.4    Imaging:  - reviewed  Pituitary MRI w/wo contrast on 1/14/23:   CLINICAL INDICATION: " disorder of the pituitary gland  PITUITARY GLAND: The previously seen  2 mm lesion along the R aspect of the pituitary gland is not clearly visualized on the examination. On prior examination it demonstrated T2 hyperintensity w/o decreased contrast enhancement, not a typical appearance for pituitary adenoma. There is mild asymmetric enlargement of the pituitary gland on the R. Pituitary infundibulum  is midline. No suprasellar masses. There is no expansion of the sella turcica. Cavernous sinuses are maintained. Optic chiasm is intact.   IMPRESSION:  No evidence of pituitary microadenoma.   Age related global atrophy and mild chronic small vessels ischemic disease.     Assessment and Plan:  # History of pituitary microadenoma, resolved  - likely nonfunctional vs microprolactinoma  - not visualized on repeat imaging in 1/2023  - even suspicion is low, will rule out Cushing's syndrome given DM, HTN, hirsutism  - if no evidence of Cushing's disease -> no further evaluation is required  - patient is in menopause, no galactorrhea, prolactin is minimally elevated  Plan:  Reassured the patient.   No further evaluation or imaging if DST wnl.   Proceed with DST to rule out Cushing's syndrome.     # Hyperprolactinemia  - history of therapy with bromocriptine x 2 years and reducing size of pituitary gland  - no microadenoma visualized on pituitary MRI now  - persistent mild elevation of prolactin in menopausal woman without galactorrhea or pituitary macroadenoma does not indicate any intervention  Plan:  Reassured the patient.   Repeat one more time prolactin level.   No further investigation is required.     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  - facial hair growth only, disturbing, but non progressing  - never had it during her reproductive period  - accompanied by male pattern hair loss, but no acne, no deepening of the voice or clitoromegaly  - noted elevation of total testosterone since 2012 up to 149, DHEA-S was  normal, mild hyperprolactinemia at 20 - 40s  - prior work up didn't show any ovarian lesions  - admits having DM and HTN, weight gain, but no cushingoid fat redistribution, purple striae, proximal muscle weakness  - nonprogressing nature of issue x 12 years, total testosterone is trending down -> reassuring, rules out ovarian cancer, ovarian hyperthecosis  - it is questionable if mild hyperprolactinemia is contributory  - prior therapy with spironolactone didn't work for the patient  - continue cosmetic interventions  Plan:  Reassured the patient.   Even clinical suspicion is low we will rule out Cushing's syndrome given hyperandrogenemia, history of microadenoma, DM, HTN, proceed with DST  Obtain androstendione level.   Continue cosmetic procedures.     RTC: 3 weeks  Total time (face-to-face and non-face-to face time):  60 min - extensive discussion of diagnoses, treatment,  medical charts, lab, imaging review, documentation.      Plan reviewed with the patient and agreed with plan.  All questions answered to patient's satisfaction.  Thank you kindly for allowing me to participate in the care plan for this patient.  Allyson Nielsen MD    CC:   Sherine Cleary M.D.

## 2024-02-10 ENCOUNTER — OFFICE VISIT (OUTPATIENT)
Dept: URGENT CARE | Facility: CLINIC | Age: 64
End: 2024-02-10
Payer: COMMERCIAL

## 2024-02-10 ENCOUNTER — HOSPITAL ENCOUNTER (OUTPATIENT)
Facility: MEDICAL CENTER | Age: 64
End: 2024-02-10
Payer: COMMERCIAL

## 2024-02-10 VITALS
OXYGEN SATURATION: 94 % | WEIGHT: 168.5 LBS | TEMPERATURE: 98.4 F | RESPIRATION RATE: 16 BRPM | DIASTOLIC BLOOD PRESSURE: 84 MMHG | HEART RATE: 66 BPM | BODY MASS INDEX: 29.86 KG/M2 | HEIGHT: 63 IN | SYSTOLIC BLOOD PRESSURE: 162 MMHG

## 2024-02-10 DIAGNOSIS — R31.9 HEMATURIA, UNSPECIFIED TYPE: ICD-10-CM

## 2024-02-10 DIAGNOSIS — N30.01 ACUTE CYSTITIS WITH HEMATURIA: ICD-10-CM

## 2024-02-10 LAB
APPEARANCE UR: NORMAL
BILIRUB UR STRIP-MCNC: NEGATIVE MG/DL
COLOR UR AUTO: NORMAL
GLUCOSE UR STRIP.AUTO-MCNC: NORMAL MG/DL
KETONES UR STRIP.AUTO-MCNC: NORMAL MG/DL
LEUKOCYTE ESTERASE UR QL STRIP.AUTO: NORMAL
NITRITE UR QL STRIP.AUTO: POSITIVE
PH UR STRIP.AUTO: 5 [PH] (ref 5–8)
PROT UR QL STRIP: NORMAL MG/DL
RBC UR QL AUTO: NORMAL
SP GR UR STRIP.AUTO: 1.01
UROBILINOGEN UR STRIP-MCNC: NORMAL MG/DL

## 2024-02-10 PROCEDURE — 87077 CULTURE AEROBIC IDENTIFY: CPT

## 2024-02-10 PROCEDURE — 87186 SC STD MICRODIL/AGAR DIL: CPT

## 2024-02-10 PROCEDURE — 81002 URINALYSIS NONAUTO W/O SCOPE: CPT

## 2024-02-10 PROCEDURE — 87660 TRICHOMONAS VAGIN DIR PROBE: CPT

## 2024-02-10 PROCEDURE — 3077F SYST BP >= 140 MM HG: CPT

## 2024-02-10 PROCEDURE — 87480 CANDIDA DNA DIR PROBE: CPT

## 2024-02-10 PROCEDURE — 99213 OFFICE O/P EST LOW 20 MIN: CPT

## 2024-02-10 PROCEDURE — 87086 URINE CULTURE/COLONY COUNT: CPT

## 2024-02-10 PROCEDURE — 3079F DIAST BP 80-89 MM HG: CPT

## 2024-02-10 PROCEDURE — 87510 GARDNER VAG DNA DIR PROBE: CPT

## 2024-02-10 RX ORDER — SULFAMETHOXAZOLE AND TRIMETHOPRIM 800; 160 MG/1; MG/1
1 TABLET ORAL 2 TIMES DAILY
Qty: 6 TABLET | Refills: 0 | Status: SHIPPED | OUTPATIENT
Start: 2024-02-10 | End: 2024-02-10

## 2024-02-10 RX ORDER — NITROFURANTOIN 25; 75 MG/1; MG/1
100 CAPSULE ORAL 2 TIMES DAILY
Qty: 10 CAPSULE | Refills: 0 | Status: SHIPPED | OUTPATIENT
Start: 2024-02-10 | End: 2024-02-13

## 2024-02-10 ASSESSMENT — FIBROSIS 4 INDEX: FIB4 SCORE: 1.36

## 2024-02-11 DIAGNOSIS — R31.9 HEMATURIA, UNSPECIFIED TYPE: ICD-10-CM

## 2024-02-11 LAB
CANDIDA DNA VAG QL PROBE+SIG AMP: NEGATIVE
G VAGINALIS DNA VAG QL PROBE+SIG AMP: NEGATIVE
T VAGINALIS DNA VAG QL PROBE+SIG AMP: NEGATIVE

## 2024-02-12 ASSESSMENT — ENCOUNTER SYMPTOMS
VOMITING: 0
BLURRED VISION: 0
MYALGIAS: 0
SPUTUM PRODUCTION: 0
BACK PAIN: 0
CHILLS: 0
COUGH: 0
WHEEZING: 0
FEVER: 0
SHORTNESS OF BREATH: 0
DIARRHEA: 0
NECK PAIN: 0
ABDOMINAL PAIN: 0
FLANK PAIN: 0
DIZZINESS: 0
STRIDOR: 0
NAUSEA: 0
WEAKNESS: 0
SORE THROAT: 0
HEADACHES: 0

## 2024-02-12 NOTE — PROGRESS NOTES
Subjective     Mable Dennison is a 63 y.o. female who presents with hematuria and urinary urgency x1 day.     HPI:   Mable is a 64yo female presenting for hematuria and urinary urgency. Reports urgency x10 days with hematuria beginning today. Reports she recently treated yeast infection symptoms with over the counter medication. Denies current abnormal vaginal discharge or itching. No dysuria. Denies flank pain. No abdominal pain, vomiting, or diarrhea. Denies fevers/chills. No shortness of breath.     Review of Systems   Constitutional:  Negative for chills, fever and malaise/fatigue.   HENT:  Negative for sore throat.    Eyes:  Negative for blurred vision.   Respiratory:  Negative for cough, sputum production, shortness of breath, wheezing and stridor.    Cardiovascular:  Negative for chest pain and leg swelling.   Gastrointestinal:  Negative for abdominal pain, diarrhea, nausea and vomiting.   Genitourinary:  Positive for frequency, hematuria and urgency. Negative for dysuria and flank pain.   Musculoskeletal:  Negative for back pain, myalgias and neck pain.   Skin:  Negative for rash.   Neurological:  Negative for dizziness, weakness and headaches.     Past Medical History:   Diagnosis Date    Chickenpox     Hirsutism 2012    elevated testosterone    Hyperprolactinemia (HCC) 2012    minimal = 31    Melanoma (HCC)     Mumps     Pituitary abnormality (HCC)     2mm cyst or microadenoma    Tonsillitis       Past Surgical History:   Procedure Laterality Date    CHOLECYSTECTOMY      TONSILLECTOMY        Allergies: Codeine and Pcn [penicillins]     Current Outpatient Medications:     OZEMPIC, 0.25 OR 0.5 MG/DOSE, 2 MG/3ML Solution Pen-injector, , Disp: , Rfl:     rosuvastatin (CRESTOR) 20 MG Tab, , Disp: , Rfl:     FARXIGA 10 MG Tab, , Disp: , Rfl:     progesterone (PROMETRIUM) 100 MG Cap, , Disp: , Rfl:     losartan (COZAAR) 25 MG Tab, Take 1 Tablet by mouth every day., Disp: 90 Tablet, Rfl: 0     Multiple  "Vitamin (MULTI-VITAMIN DAILY PO), Take  by mouth., Disp: , Rfl:     Social History     Tobacco Use    Smoking status: Never    Smokeless tobacco: Never   Vaping Use    Vaping Use: Never used   Substance Use Topics    Alcohol use: Yes     Alcohol/week: 0.0 oz     Comment: 2 on the weekends    Drug use: Not Currently     Types: Marijuana, Oral      Family History   Problem Relation Age of Onset    Diabetes Mother     Diabetes Father     Heart Disease Father     No Known Problems Sister     No Known Problems Brother     No Known Problems Daughter     Cancer Neg Hx     Hypertension Neg Hx     Hyperlipidemia Neg Hx     Stroke Neg Hx     Psychiatric Illness Neg Hx     Thyroid Neg Hx     Ovarian Cancer Neg Hx     Tubal Cancer Neg Hx     Peritoneal Cancer Neg Hx     Colorectal Cancer Neg Hx     Breast Cancer Neg Hx       Medications, Allergies, and current problem list reviewed today in Epic.       Objective     BP (!) 162/84 (BP Location: Left arm, Patient Position: Sitting, BP Cuff Size: Adult)   Pulse 66   Temp 36.9 °C (98.4 °F) (Temporal)   Resp 16   Ht 1.6 m (5' 3\")   Wt 76.4 kg (168 lb 8 oz)   SpO2 94%   BMI 29.85 kg/m²      Physical Exam  Vitals reviewed.   Constitutional:       General: She is not in acute distress.  HENT:      Nose: Nose normal.   Eyes:      Extraocular Movements: Extraocular movements intact.      Conjunctiva/sclera: Conjunctivae normal.      Pupils: Pupils are equal, round, and reactive to light.   Cardiovascular:      Rate and Rhythm: Normal rate and regular rhythm.      Pulses: Normal pulses.      Heart sounds: Normal heart sounds.   Pulmonary:      Effort: Pulmonary effort is normal. No tachypnea, accessory muscle usage, prolonged expiration, respiratory distress or retractions.      Breath sounds: Normal breath sounds. No stridor. No wheezing, rhonchi or rales.   Abdominal:      Tenderness: There is no right CVA tenderness or left CVA tenderness.   Musculoskeletal:      Cervical back: " Normal range of motion and neck supple.      Right lower leg: No edema.      Left lower leg: No edema.   Skin:     General: Skin is warm and dry.   Neurological:      Mental Status: She is alert. Mental status is at baseline.   Psychiatric:         Mood and Affect: Mood normal.         Behavior: Behavior normal.         Thought Content: Thought content normal.       Results for orders placed or performed during the hospital encounter of 02/10/24   URINE CULTURE(NEW)    Specimen: Urine   Result Value Ref Range    Significant Indicator POS (POS)     Source UR     Site -     Culture Result Usual urogenital marlene ,000 cfu/mL (A)     Culture Result Enterobacter cloacae complex  >100,000 cfu/mL   (A)        Susceptibility    Enterobacter cloacae complex - MARTHA     Ampicillin >16 Resistant mcg/mL     Amoxicillin/CA >16/8 Resistant mcg/mL     Ceftriaxone 8 Resistant mcg/mL     Cefazolin* >16 Resistant mcg/mL      * Breakpoints when Cefazolin is used for therapy of infections  other than uncomplicated UTIs due to Enterobacterales are as  follows:  MARTHA and Interpretation:  <=2 S  4 I  >=8 R       Ciprofloxacin <=0.25 Sensitive mcg/mL     Cefepime <=2 Sensitive mcg/mL     Cefuroxime >16 Resistant mcg/mL     Ampicillin/sulbactam >16/8 Resistant mcg/mL     Nitrofurantoin 64 Intermediate mcg/mL     Tobramycin <=2 Sensitive mcg/mL     Gentamicin <=2 Sensitive mcg/mL     Levofloxacin <=0.5 Sensitive mcg/mL     Minocycline <=4 Sensitive mcg/mL     Pip/Tazobactam <=8 Sensitive mcg/mL     Trimeth/Sulfa <=0.5/9.5 Sensitive mcg/mL     Tigecycline <=2 Sensitive mcg/mL   VAGINAL PATHOGENS DNA PANEL   Result Value Ref Range    Candida species DNA Probe Negative Negative    Trichamonas vaginalis DNA Probe Negative Negative    Gardnerella vaginalis DNA Probe Negative Negative     Assessment & Plan     1. Hematuria, unspecified type   - VAGINAL PATHOGENS DNA PANEL; Future  - POCT Urinalysis  - URINE CULTURE(NEW); Future    2. Acute  cystitis with hematuria   - sulfamethoxazole-trimethoprim (BACTRIM DS) 800-160 MG tablet; Take 1 Tablet by mouth 2 times a day for 3 days.  Dispense: 6 Tablet; Refill: 0       MDM/Comments:   Urinalysis and symptom presentation consistent with diagnosis of acute cystitis. Will obtain urine culture. No sign of pyelonephritis, renal calculi, or other emergent pathology. No red flag/alarm feature findings present on history or examination.    Patient will be prescribed Nitrofurantoin based on previous urine culture result positive for Staphylococcus haemolyticus.   Cockcroft-Gault method utilized to calculate CrCl, no renal dosing required.      2/13/24 - Urine culture result positive for Enterobacter cloacae complex, indeterminate if Nitrofurantoin is effective based on susceptibility report. Will treat with Bactrim as this is sensitive to this type of bacteria. Patient notified to stop taking Nitrofurantoin. Patient reports improvement in urinary symptoms.       Illness progression and alarm symptoms discussed with patient, emphasizing low threshold for returning to clinic/emergency department for worsening symptoms. Patient is agreeable to the plan and verbalizes understanding, and will follow up if warranted. Will return if body aches, chills, fever, back/flank pain occur. Discussed signs of kidney infection.      Differential diagnosis, natural history, supportive care, and indications for immediate follow-up discussed.      Discussed red flags and reasons to return to UC or ED.       Follow-up as needed if symptoms worsen or fail to improve to PCP, Urgent care or Emergency Room.                     Electronically signed by LE Ag

## 2024-02-13 RX ORDER — SULFAMETHOXAZOLE AND TRIMETHOPRIM 800; 160 MG/1; MG/1
1 TABLET ORAL 2 TIMES DAILY
Qty: 6 TABLET | Refills: 0 | Status: SHIPPED | OUTPATIENT
Start: 2024-02-13 | End: 2024-02-13

## 2024-03-01 ENCOUNTER — PATIENT MESSAGE (OUTPATIENT)
Dept: HEALTH INFORMATION MANAGEMENT | Facility: OTHER | Age: 64
End: 2024-03-01

## 2024-03-04 LAB
CORTIS SERPL-MCNC: 1.1 UG/DL (ref 6.2–19.4)
DEXAMETHASONE SERPL-MCNC: 934 NG/DL
IGF-I SERPL-MCNC: 99 NG/ML (ref 57–202)
PROLACTIN SERPL-MCNC: 40.9 NG/ML (ref 3.6–25.2)
T4 FREE SERPL-MCNC: 1.27 NG/DL (ref 0.82–1.77)
TSH SERPL DL<=0.005 MIU/L-ACNC: 0.64 UIU/ML (ref 0.45–4.5)

## 2024-03-22 ENCOUNTER — HOSPITAL ENCOUNTER (OUTPATIENT)
Facility: MEDICAL CENTER | Age: 64
End: 2024-03-22
Attending: OBSTETRICS & GYNECOLOGY
Payer: COMMERCIAL

## 2024-03-22 ENCOUNTER — OFFICE VISIT (OUTPATIENT)
Dept: OBGYN | Facility: CLINIC | Age: 64
End: 2024-03-22
Payer: COMMERCIAL

## 2024-03-22 VITALS
DIASTOLIC BLOOD PRESSURE: 89 MMHG | BODY MASS INDEX: 29.95 KG/M2 | WEIGHT: 169 LBS | HEIGHT: 63 IN | SYSTOLIC BLOOD PRESSURE: 162 MMHG

## 2024-03-22 DIAGNOSIS — N89.8 VAGINAL IRRITATION: ICD-10-CM

## 2024-03-22 DIAGNOSIS — Z01.419 WOMEN'S ANNUAL ROUTINE GYNECOLOGICAL EXAMINATION: Primary | ICD-10-CM

## 2024-03-22 DIAGNOSIS — N89.8 VAGINAL DISCHARGE: ICD-10-CM

## 2024-03-22 DIAGNOSIS — R03.0 ELEVATED BLOOD PRESSURE READING: ICD-10-CM

## 2024-03-22 PROCEDURE — 87480 CANDIDA DNA DIR PROBE: CPT

## 2024-03-22 PROCEDURE — 87510 GARDNER VAG DNA DIR PROBE: CPT

## 2024-03-22 PROCEDURE — 3077F SYST BP >= 140 MM HG: CPT | Performed by: OBSTETRICS & GYNECOLOGY

## 2024-03-22 PROCEDURE — 3079F DIAST BP 80-89 MM HG: CPT | Performed by: OBSTETRICS & GYNECOLOGY

## 2024-03-22 PROCEDURE — 99203 OFFICE O/P NEW LOW 30 MIN: CPT | Mod: 25 | Performed by: OBSTETRICS & GYNECOLOGY

## 2024-03-22 PROCEDURE — 99386 PREV VISIT NEW AGE 40-64: CPT | Performed by: OBSTETRICS & GYNECOLOGY

## 2024-03-22 PROCEDURE — 87660 TRICHOMONAS VAGIN DIR PROBE: CPT

## 2024-03-22 RX ORDER — CLOBETASOL PROPIONATE 0.5 MG/G
1 OINTMENT TOPICAL DAILY
Qty: 45 G | Refills: 1 | Status: SHIPPED | OUTPATIENT
Start: 2024-03-22

## 2024-03-22 ASSESSMENT — FIBROSIS 4 INDEX: FIB4 SCORE: 1.36

## 2024-03-22 NOTE — PROGRESS NOTES
ANNUAL GYNECOLOGY VISIT    HPI:  Patient is a 63 y.o.  who presents for her annual exam.  Her main concern is that she has been having some external vaginal irritation.  She is currently using an external moisturizer cream and this helps a little.  She has been menopausal since age 46.  She denies any postmenopausal bleeding or spotting.  She is sexually active with 1 male partner.  She denies any need for STD testing today.  She is receiving hormone replacement therapy from another provider.  She is using an estrogen cream and taking Prometrium 100 mg daily.  She just started on this regimen in 2023.  She has hyperprolactinemia and she has had negative imaging for a pituitary microadenoma.  She is currently being followed by endocrinology.     PREVENTIVE CARE:  Immunization History   Administered Date(s) Administered    INFLUENZA TIV (IM) 10/22/2012, 10/28/2018    Influenza Seasonal Injectable - Historical Data 10/24/2017    Influenza Vaccine Pediatric Split - Historical Data 10/24/2017    Influenza Vaccine Quad Inj (Pf) 2016, 10/24/2017, 10/29/2019, 2020, 10/03/2022, 10/15/2022    Influenza Vaccine Quad Recombinant 2021    PFIZER BIVALENT SARS-COV-2 VACCINE (12+) 2022    PFIZER WHALEY CAP SARS-COV-2 VACCINATION (12+) 2022    PFIZER PURPLE CAP SARS-COV-2 VACCINATION (12+) 2021, 2021, 2021    Pneumococcal Conjugate Vaccine (PCV20) 2022    Pneumococcal polysaccharide vaccine (PPSV-23) 2020    Tdap Vaccine 2017    Zoster Vaccine Recombinant (RZV) (SHINGRIX) 2020, 2020     Last Mammogram: Last mammogram was in November at [facility] and it was negative.    CANCER RISK ASSESSMENT:  Family history of:   - Breast cancer: no   - Ovarian cancer: no   - Uterine cancer: no   - Colon cancer: no    GYN HX and ROS:   Last Pap: last year with [facility] and negative   Failed to redirect to the Timeline version of the REVFS  SmartLink.  Hx Mod or Severe Dysplasia : No  Age at Menopause: 46  Sexually Active: yes    Postmenopausal bleeding: No  Sexual problems: No  Significant pelvic pain: No    ROS:    Positive ROS:   General: She denies excessive fatigue, weakness, unexplained weight loss or gain, abnormal thirst, unexplained fever/chills.  Neurologic: She denies fainting spells, convulsions, dizzy spells, depression or anxiety or vision changes.  HEENT: She denies persistent swollen glands, pain or stiff neck, goiter or lump in her neck.  Heart / Lung: She denies persistent cough, shortness of breath, severe chest pain or recurrent heart flutters.  Breast: She denies breast discharge, pain, lump or lump under her arm.  Gastrointestinal: She denies bloody stools, loss of appetite, change in bowel habits, constipation, diarrhea, nausea, vomiting or persistent abdominal pain.  Urinary: She denies dysuria, hematuria, kidney or bladder infections.  Extremeties: She denies joint pain, persistently swollen ankles or recurrent leg cramps.    OBSTETRIC HISTORY:  OB History    Para Term  AB Living   6 1 1   5 1   SAB IAB Ectopic Molar Multiple Live Births   5                # Outcome Date GA Lbr Jerson/2nd Weight Sex Delivery Anes PTL Lv   6 SAB            5 SAB            4 SAB            3 SAB            2 SAB            1 Term                MEDICAL HISTORY:  Past Medical History:   Diagnosis Date    Chickenpox     Hirsutism     elevated testosterone    Hyperprolactinemia (HCC)     minimal = 31    Melanoma (HCC)     Mumps     Pituitary abnormality (HCC)     2mm cyst or microadenoma    Tonsillitis        MEDICATIONS:  Current Outpatient Medications on File Prior to Visit   Medication Sig Dispense Refill    OZEMPIC, 0.25 OR 0.5 MG/DOSE, 2 MG/3ML Solution Pen-injector       rosuvastatin (CRESTOR) 20 MG Tab       progesterone (PROMETRIUM) 100 MG Cap       dexamethasone (DECADRON) 1 MG Tab Take 1 Tablet by mouth one time as  needed (TAKE AT MIDNIGHT FOR TEST) for up to 1 dose. 2 Tablet 0    losartan (COZAAR) 25 MG Tab Take 1 Tablet by mouth every day. 90 Tablet 0    Multiple Vitamin (MULTI-VITAMIN DAILY PO) Take  by mouth.      FARXIGA 10 MG Tab        Current Facility-Administered Medications on File Prior to Visit   Medication Dose Route Frequency Provider Last Rate Last Admin    eucalyptus/peppermint oil (PONARIS NASAL) nasal emollient 0.5 mL  10 Drop Nasal Q6HRS PRN Kranthi Robbins P.A.-C.           ALLERGIES / REACTIONS:  Allergies   Allergen Reactions    Codeine Nausea    Pcn [Penicillins] Hives       FAMILY HISTORY:  Family History   Problem Relation Age of Onset    Diabetes Mother     Diabetes Father     Heart Disease Father     No Known Problems Sister     No Known Problems Brother     No Known Problems Daughter     Cancer Neg Hx     Hypertension Neg Hx     Hyperlipidemia Neg Hx     Stroke Neg Hx     Psychiatric Illness Neg Hx     Thyroid Neg Hx     Ovarian Cancer Neg Hx     Tubal Cancer Neg Hx     Peritoneal Cancer Neg Hx     Colorectal Cancer Neg Hx     Breast Cancer Neg Hx        SURGICAL HISTORY:  Past Surgical History:   Procedure Laterality Date    CHOLECYSTECTOMY      TONSILLECTOMY      TUBE & ECTOPIC PREG., REMOVAL         SOCIAL HISTORY:  Social History     Socioeconomic History    Marital status:      Spouse name: Not on file    Number of children: Not on file    Years of education: Not on file    Highest education level: Not on file   Occupational History    Not on file   Tobacco Use    Smoking status: Never    Smokeless tobacco: Never   Vaping Use    Vaping Use: Never used   Substance and Sexual Activity    Alcohol use: Yes     Alcohol/week: 0.0 oz     Comment: 2 on the weekends    Drug use: Not Currently     Types: Marijuana, Oral    Sexual activity: Yes     Partners: Male   Other Topics Concern    Not on file   Social History Narrative    Not on file     Social Determinants of Health     Financial  "Resource Strain: Unknown (3/8/2022)    Overall Financial Resource Strain (CARDIA)     Difficulty of Paying Living Expenses: Patient declined   Food Insecurity: Unknown (3/8/2022)    Hunger Vital Sign     Worried About Running Out of Food in the Last Year: Patient declined     Ran Out of Food in the Last Year: Patient declined   Transportation Needs: Unknown (3/8/2022)    PRAPARE - Transportation     Lack of Transportation (Medical): Patient declined     Lack of Transportation (Non-Medical): Patient declined   Physical Activity: Insufficiently Active (3/8/2022)    Exercise Vital Sign     Days of Exercise per Week: 4 days     Minutes of Exercise per Session: 30 min   Stress: Stress Concern Present (3/8/2022)    [employer] [place] [employer] - Occupational Stress Questionnaire     Feeling of Stress : To some extent   Social Connections: Unknown (3/8/2022)    Social Connection and Isolation Panel [NHANES]     Frequency of Communication with Friends and Family: Patient declined     Frequency of Social Gatherings with Friends and Family: Patient declined     Attends Cheondoism Services: Patient declined     Active Member of Clubs or Organizations: Patient declined     Attends Club or Organization Meetings: Patient declined     Marital Status: Patient declined   Intimate Partner Violence: Not on file   Housing Stability: Unknown (3/8/2022)    Housing Stability Vital Sign     Unable to Pay for Housing in the Last Year: Patient refused     Number of Places Lived in the Last Year: Not on file     Unstable Housing in the Last Year: Patient refused         PHYSICAL EXAMINATION:  Vital Signs:   Vitals:    03/22/24 1137   BP: (!) 162/89   Weight: 169 lb   Height: 5' 3\"     Body mass index is 29.94 kg/m².  Constitutional: The patient is well developed and well nourished.  Psychiatric: Patient is oriented to time place and person.   Skin: No rash observed.  Neck: Appears symmetric.   Lungs: Non labored breathing  Breast: " Inspection reveals no asymetry or nipple discharge, no skin thickening, dimpling or erythema.  Palpation demonstrates no masses.  Abdomen: Soft, non-tender.  Pelvic Exam:     Vulva: Vulva is erythematous.    Urethra - no lesions, no erythema    Vagina: moist, pale pink, decreased ruggae, may have a yeast infection, vaginal pathogens collected    Cervix: pink, smooth, no lesions, no CMT    Uterus - non-tender, normal size, shape, contour, mobile    Ovaries: non-tender, no appreciable masses  Pap Smear Performed: No  Extremeties: Legs are symmetric and without tenderness. There is no edema present.    ASSESSMENT AND PLAN:  63 y.o. .here for annual exam    1. Women's annual routine gynecological examination    -Breast and pelvic exam completed  -Pap: Negative last year [facility]. Plans to fill out records release this visit.  -Mammogram: 2023 negative with [facility]  -Advised on breast self awareness    2. Vaginal discharge  - Some vaginal discharge, may have a yeast infection, vaginal pathogens collected  - VAGINAL PATHOGENS DNA PANEL; Future    3. Vaginal irritation  -Recommend using clobetasol externally.  If this does not help over the next couple of weeks, then she needs to schedule another visit for a vulvar biopsy.  - clobetasol (TEMOVATE) 0.05 % Ointment; Apply 1 Application topically every day. Apply externally daily for 2 weeks, then every other day for 2 weeks, then twice weekly  Dispense: 45 g; Refill: 1    4. Elevated blood pressure  -I discussed with the patient the importance of following up with a primary care physician regarding her elevated blood pressure.  She is aware that she has had elevated blood pressures and she plans to follow-up.    Follow up: Annually or prn.     Padmini Verdin M.D.

## 2024-03-27 ENCOUNTER — TELEPHONE (OUTPATIENT)
Dept: OBGYN | Facility: CLINIC | Age: 64
End: 2024-03-27
Payer: COMMERCIAL

## 2024-05-04 NOTE — PROGRESS NOTES
Follow up Endocrinology Visit  Initial consult/last visit on: 1/16/24  Referred by: Sherine Cleary M.D.    Patient was presented for a telehealth consultation via secure and encrypted videoconferencing technology.    Location of Provider - office  Location of Patient - home  Patient Consent - yes  Platform used - Zoom  Total time - 40 min    Chief complaint:  Mable Dennison, 63 y.o., female, who is here for follow up for pituitary microadenoma and hirsutism management.     Interval history:   - no new events since last visit  - reviewed recent labs  - patient needs a letter explaining reasoning of recent pituitary imaging done in [facility] ordered by PCP  Pituitary microadenoma:  - patient has poor recollection of the history  - she believes that pituitary problems were noted at her 25 yo, was told that she had pituitary enlargement, elevated prolactin? was treated with bromocriptine x 2 years -> pituitary enlargement receded, therapy was stopped  - as per review Dr. Velez, prior endocrinologist, at the time of prolactin elevation she was on OCPs and had no galactorrhea; off bromocriptine  she did not have a fertility problems  -  on pituitary MRI in 2012 - microadenoma - 2 mm  - no features of Cushing's syndrome  - had recent pituitary MRI    Hirsutism:  - facial hair growth only, disturbing, non progressing  - since perimenopause  - never had it during her reproductive period  - accompanied by male pattern hair loss, but no acne, no deepening of the voice or clitoromegaly  - noted elevation of total testosterone since 2012 up to 149, DHEA-S was normal, mild hyperprolactinemia at 20 - 40s  - prior work up didn't show any ovarian lesions  - admits having DM and HTN, weight gain, but no cushingoid fat redistribution, purple striae, proximal muscle weakness  - was previously treated with spironolactone 100 mg/day, but can not recall having any benefit from the medication  - currently following  "\"hormonal doctor\" who prescribes her progesterone, which she likes as she sleeps better    Medications:  Current Outpatient Medications:     clobetasol (TEMOVATE) 0.05 % Ointment, Apply 1 Application topically every day. Apply externally daily for 2 weeks, then every other day for 2 weeks, then twice weekly, Disp: 45 g, Rfl: 1    OZEMPIC, 0.25 OR 0.5 MG/DOSE, 2 MG/3ML Solution Pen-injector, , Disp: , Rfl:     rosuvastatin (CRESTOR) 20 MG Tab, , Disp: , Rfl:     FARXIGA 10 MG Tab, , Disp: , Rfl:     progesterone (PROMETRIUM) 100 MG Cap, , Disp: , Rfl:     dexamethasone (DECADRON) 1 MG Tab, Take 1 Tablet by mouth one time as needed (TAKE AT MIDNIGHT FOR TEST) for up to 1 dose., Disp: 2 Tablet, Rfl: 0    losartan (COZAAR) 25 MG Tab, Take 1 Tablet by mouth every day., Disp: 90 Tablet, Rfl: 0    Multiple Vitamin (MULTI-VITAMIN DAILY PO), Take  by mouth., Disp: , Rfl:     Current Facility-Administered Medications:     eucalyptus/peppermint oil (PONARIS NASAL) nasal emollient 0.5 mL, 10 Drop, Nasal, Q6HRS PRN, Kranthi Robbins P.A.-C.    Physical Examination:   Vital signs: Ht 1.6 m (5' 3\")   Wt 83 kg (183 lb)   BMI 32.42 kg/m²   General: No distress, cooperative, well dressed and well nourished. No cushingoid features.   Resp: Normal effort  CVS: Regular rate and rhythm  Extremities: No edema bilateral extremities  Neuro: Alert and oriented  Skin: No rash, No Ulcers. No visible hirsutism as patient is taking care of it.   Psych: Normal mood and affect    Labs:  MOST RECENT LABS:  - reviewed   Reference Range  02/26/24    TSH 0.450 - 4.500 uIU/mL 0.635   Free T-4 0.82 - 1.77 ng/dL 1.27   IGF-1 (Somat) 57 - 202 ng/mL 99   Prolactin 3.6 - 25.2 ng/mL 40.9 (H)     DST on 2/28/24:   Latest Reference Range & Units 02/26/24 04:14   Cortisol 6.2 - 19.4 ug/dL 1.1 (L)     PRIOR PERTINENT LABS:  - reviewed   Reference Range  10/29/11 07:20 01/21/12 09:20 04/23/12 09:02 06/25/12 07:27 09/10/12 08:58 09/24/12 08:56 11/05/12 09:55 " 12/09/16 06:54 12/09/17 07:19 06/16/20 04:28 03/16/22 04:48   Cortisol 2.3 - 19.4  10.6            TSH 0.450 - 4.500  0.310 (L)       0.790  1.170    Free T-4 0.82 - 1.77 0.90 1.40      1.32      ACTH Plasma 7.2 - 63.3   24.0            Dhea 31 - 701   120            Dhea-S 35.4 - 256.0    77.9           Estradiol-E2   20.3            Follicle Stimulating Hormone  35.8        32.0     Free Testosterone 0.0 - 2.2     2.7 (H) 1.7 1.3        IGF-1 (Somat) 87 - 238   96            Luteinizing Hormone  28.4        26.0     Prolactin 4.8 - 23.3   32.8 (H)     28.6 (H) 42.4 (H)   38.7 (H)   Testosterone, Total LC/MS         136 (H)      Testosterone,Total 3 - 41  149 (H)  107 (H) 87 (H) 61 (H) 62 (H) 97 (H)         4/21/23:  Total testosterone - 86 (3- 67)  Free testosterone - 0.9 (0.0 - 4.2)  Estradiol - <5  Progesterone < 0.1  LH - 28  FSH - 39.1  IGF-1 - 78  TSH - 0.716  fT4 - 1.39    10/20/23:  Total testosterone - 34 (3- 67)  Free testosterone - 0.4 (0.0 - 4.2)  DHEA - 32 (21 - 402)  Estradiol - 34.5  Progesterone - 7.5  LH - 20.2  FSH - 25.9  IGF-1 - 79  TSH - 0.695  fT4 - 1.25    11/30/24:  HbA1C - 6.0  TSH - 0.835  fT4 - 1.46  Vit D - 21.4    Imaging:  - reviewed  Pituitary MRI w/wo contrast on 1/14/23:   CLINICAL INDICATION: disorder of the pituitary gland  PITUITARY GLAND: The previously seen  2 mm lesion along the R aspect of the pituitary gland is not clearly visualized on the examination. On prior examination it demonstrated T2 hyperintensity w/o decreased contrast enhancement, not a typical appearance for pituitary adenoma. There is mild asymmetric enlargement of the pituitary gland on the R. Pituitary infundibulum  is midline. No suprasellar masses. There is no expansion of the sella turcica. Cavernous sinuses are maintained. Optic chiasm is intact.   IMPRESSION:  No evidence of pituitary microadenoma.   Age related global atrophy and mild chronic small vessels ischemic disease.     Assessment and Plan:  #  History of pituitary microadenoma, resolved  - Cushing's syndrome and acromegaly was ruled out, pituitary microadenoma resolved, reassured the patient, no follow up is required  Prior notes:  1/16/24  - likely nonfunctional vs microprolactinoma in the past, that prompted repeat brain imaging  - not visualized on repeat imaging in 1/2023  - even suspicion is low, will rule out Cushing's syndrome given DM, HTN, hirsutism  - if no evidence of Cushing's disease -> no further evaluation is required  - patient is in menopause, no galactorrhea, prolactin is minimally elevated  Plan:  Reassured the patient.   No further evaluation or imaging is required    # Hyperprolactinemia  - history of therapy with bromocriptine x 2 years and reducing size of pituitary gland  - no microadenoma visualized on pituitary MRI now  - persistent mild elevation of prolactin in menopausal woman without galactorrhea or pituitary macroadenoma does not indicate any intervention  Plan:  Reassured the patient.   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  - Cushing's syndrome/disease was ruled out  - would recommend obtaining androstenedione, if not significantly elevated -> no further evaluation is required unless progressive worsening of hirsutism, male pattern hair loss or virilization symptoms  Prior notes:  1/16/24  - facial hair growth only, disturbing, but non progressing  - never had it during her reproductive period  - accompanied by male pattern hair loss, but no acne, no deepening of the voice or clitoromegaly  - noted elevation of total testosterone since 2012 up to 149, DHEA-S was normal, mild hyperprolactinemia at 20 - 40s  - prior work up didn't show any ovarian lesions  - admits having DM and HTN, weight gain, but no cushingoid fat redistribution, purple striae, proximal muscle  weakness  - nonprogressing nature of issue x 12 years, total testosterone is trending down -> reassuring, rules out ovarian cancer, ovarian hyperthecosis  - it is questionable if mild hyperprolactinemia is contributory  - prior therapy with spironolactone didn't work for the patient  - continue cosmetic interventions  Plan:  Reassured the patient.   Cushing's syndrome was ruled out.   Obtain androstendione level.   Continue cosmetic procedures.   Re-consult endocrinology only if androstenedione is significantly elevated, or progressive worsening of hirsutism, male pattern hair loss or virilization symptoms    RTC: prn  Total time (face-to-face and non-face-to face time):  40 min - extensive discussion of diagnoses, treatment,  medical charts, lab, imaging review, documentation.    Plan reviewed with the patient and agreed with plan.  All questions answered to patient's satisfaction.  Thank you kindly for allowing me to participate in the care plan for this patient.  Allyson Nielsen MD    CC:   Sherine Cleary M.D.

## 2024-05-06 ENCOUNTER — TELEMEDICINE (OUTPATIENT)
Dept: ENDOCRINOLOGY | Facility: MEDICAL CENTER | Age: 64
End: 2024-05-06
Attending: STUDENT IN AN ORGANIZED HEALTH CARE EDUCATION/TRAINING PROGRAM
Payer: COMMERCIAL

## 2024-05-06 VITALS — HEIGHT: 63 IN | BODY MASS INDEX: 32.43 KG/M2 | WEIGHT: 183 LBS

## 2024-05-06 DIAGNOSIS — L68.0 HIRSUTISM: ICD-10-CM

## 2024-05-06 PROCEDURE — 99215 OFFICE O/P EST HI 40 MIN: CPT | Mod: 95 | Performed by: STUDENT IN AN ORGANIZED HEALTH CARE EDUCATION/TRAINING PROGRAM

## 2024-05-06 ASSESSMENT — FIBROSIS 4 INDEX: FIB4 SCORE: 1.36

## 2024-05-23 ENCOUNTER — PATIENT MESSAGE (OUTPATIENT)
Dept: ENDOCRINOLOGY | Facility: MEDICAL CENTER | Age: 64
End: 2024-05-23
Payer: COMMERCIAL

## 2024-06-22 ENCOUNTER — OFFICE VISIT (OUTPATIENT)
Dept: URGENT CARE | Facility: CLINIC | Age: 64
End: 2024-06-22
Payer: COMMERCIAL

## 2024-06-22 ENCOUNTER — HOSPITAL ENCOUNTER (OUTPATIENT)
Facility: MEDICAL CENTER | Age: 64
End: 2024-06-22
Attending: PHYSICIAN ASSISTANT
Payer: COMMERCIAL

## 2024-06-22 VITALS
RESPIRATION RATE: 16 BRPM | HEART RATE: 53 BPM | HEIGHT: 63 IN | TEMPERATURE: 97.3 F | BODY MASS INDEX: 30.72 KG/M2 | WEIGHT: 173.4 LBS | OXYGEN SATURATION: 97 % | SYSTOLIC BLOOD PRESSURE: 128 MMHG | DIASTOLIC BLOOD PRESSURE: 68 MMHG

## 2024-06-22 DIAGNOSIS — R19.7 DIARRHEA, UNSPECIFIED TYPE: ICD-10-CM

## 2024-06-22 LAB
APPEARANCE UR: NORMAL
BILIRUB UR STRIP-MCNC: NEGATIVE MG/DL
C PARVUM AG STL QL IA.RAPID: NEGATIVE
COLOR UR AUTO: NORMAL
G LAMBLIA AG STL QL IA.RAPID: NEGATIVE
GLUCOSE UR STRIP.AUTO-MCNC: 1000 MG/DL
KETONES UR STRIP.AUTO-MCNC: NEGATIVE MG/DL
LEUKOCYTE ESTERASE UR QL STRIP.AUTO: NEGATIVE
NITRITE UR QL STRIP.AUTO: NEGATIVE
PH UR STRIP.AUTO: 5.5 [PH] (ref 5–8)
PROT UR QL STRIP: 30 MG/DL
RBC UR QL AUTO: NORMAL
SP GR UR STRIP.AUTO: 1.02
UROBILINOGEN UR STRIP-MCNC: 0.2 MG/DL

## 2024-06-22 PROCEDURE — 3074F SYST BP LT 130 MM HG: CPT | Performed by: PHYSICIAN ASSISTANT

## 2024-06-22 PROCEDURE — 99214 OFFICE O/P EST MOD 30 MIN: CPT | Performed by: PHYSICIAN ASSISTANT

## 2024-06-22 PROCEDURE — 87329 GIARDIA AG IA: CPT

## 2024-06-22 PROCEDURE — 87328 CRYPTOSPORIDIUM AG IA: CPT

## 2024-06-22 PROCEDURE — 87046 STOOL CULTR AEROBIC BACT EA: CPT

## 2024-06-22 PROCEDURE — 87045 FECES CULTURE AEROBIC BACT: CPT

## 2024-06-22 PROCEDURE — 81002 URINALYSIS NONAUTO W/O SCOPE: CPT | Performed by: PHYSICIAN ASSISTANT

## 2024-06-22 PROCEDURE — 87209 SMEAR COMPLEX STAIN: CPT

## 2024-06-22 PROCEDURE — 87899 AGENT NOS ASSAY W/OPTIC: CPT

## 2024-06-22 PROCEDURE — 3078F DIAST BP <80 MM HG: CPT | Performed by: PHYSICIAN ASSISTANT

## 2024-06-22 PROCEDURE — 87177 OVA AND PARASITES SMEARS: CPT

## 2024-06-22 ASSESSMENT — FIBROSIS 4 INDEX: FIB4 SCORE: 1.36

## 2024-06-22 NOTE — PROGRESS NOTES
"Subjective:   Mable Dennison is a 63 y.o. female who presents for Diarrhea (Loose stool, nothing solid X 3 weeks. States no blood in stool.) and Dysuria (Urgency, painful urination, persistent X 5 months. Medication Cream not providing relief.)     3 weeks ho diarrhea  Diarrhea follows food intake immediately  3-5 episodes/day, non bloody  Waking her up  No f/c/n/v  Mild discomfort with movement  No recent travel outside of the country, no recent antibiotic use  Very foul smelling  No camping or drinking of stream water   eats same food, no diarrhea  Did have a burger that may have been partially cooked at home  Ho cholecystectomy  Making urine  Has tried imodium, does help    Medications:  clobetasol Oint  dexamethasone Tabs  eucalyptus/peppermint oil  Farxiga Tabs  losartan Tabs  MULTI-VITAMIN DAILY PO  Ozempic (0.25 or 0.5 MG/DOSE) Sopn  progesterone Caps  rosuvastatin Tabs    Allergies:             Codeine and Pcn [penicillins]    Surgical History:         Past Surgical History:   Procedure Laterality Date    CHOLECYSTECTOMY      TONSILLECTOMY      TUBE & ECTOPIC PREG., REMOVAL         Past Social Hx:  Mable Dennison  reports that she has never smoked. She has never used smokeless tobacco. She reports current alcohol use. She reports that she does not currently use drugs after having used the following drugs: Marijuana and Oral.     Past Family Hx:   Mable Dennison family history includes Diabetes in her father and mother; Heart Disease in her father; No Known Problems in her brother, daughter, and sister.       Problem list, medications, and allergies reviewed by myself today in Epic.     Objective:     /68 (BP Location: Right arm, Patient Position: Sitting, BP Cuff Size: Adult)   Pulse (!) 53   Temp 36.3 °C (97.3 °F) (Temporal)   Resp 16   Ht 1.6 m (5' 3\")   Wt 78.7 kg (173 lb 6.4 oz)   SpO2 97%   BMI 30.72 kg/m²     Physical Exam  Vitals and nursing note reviewed. "   Constitutional:       General: She is not in acute distress.     Appearance: Normal appearance. She is not ill-appearing, toxic-appearing or diaphoretic.   HENT:      Nose: Nose normal.      Mouth/Throat:      Mouth: Mucous membranes are moist.      Pharynx: No oropharyngeal exudate or posterior oropharyngeal erythema.   Eyes:      Extraocular Movements: Extraocular movements intact.      Pupils: Pupils are equal, round, and reactive to light.   Cardiovascular:      Rate and Rhythm: Normal rate and regular rhythm.      Pulses: Normal pulses.      Heart sounds: Normal heart sounds.   Pulmonary:      Effort: Pulmonary effort is normal. No tachypnea, accessory muscle usage, prolonged expiration, respiratory distress or retractions.      Breath sounds: Normal breath sounds and air entry. No stridor or decreased air movement. No decreased breath sounds, wheezing, rhonchi or rales.      Comments: Lungs cta b/l  Abdominal:      General: Abdomen is flat. Bowel sounds are normal. There is no distension. There are no signs of injury.      Palpations: Abdomen is soft. There is no splenomegaly, mass or pulsatile mass.      Tenderness: There is no abdominal tenderness. There is no right CVA tenderness, left CVA tenderness, guarding or rebound. Negative signs include Peres's sign, Rovsing's sign and McBurney's sign.      Hernia: No hernia is present.   Musculoskeletal:      Cervical back: No rigidity.   Lymphadenopathy:      Cervical: No cervical adenopathy.   Neurological:      Mental Status: She is alert.         Assessment/Plan:     Diagnosis and Associated Orders:     1. Diarrhea, unspecified type  - EHEC(Shiga Toxin)Detection; Future  - CULTURE STOOL; Future  - Cdiff By PCR Rflx Toxin; Future  - CRYPTO/GIARDIA RAPID ASSAY; Future  - Ova & Parasite Exam, Fecal; Future  - Referral to Gastroenterology  - POCT Urinalysis  - CBC WITH DIFFERENTIAL; Future  - Comp Metabolic Panel; Future  - LIPASE;  Future        Comments/MDM:  Patient presents with 3-week history of diarrhea.  Does appear to be postprandial at times.  History of appendectomy.  No recent camping, ingestion of stream water, foreign travel or antibiotic use.  Symptoms or not improving greater than 3 weeks now.  Stool studies collected.  Referral placed to GI.  She is on Ozempic.  Did consider possible pancreatitis although does not have any severe pain or fevers.  Will order CBC with differential, CMP and lipase.  She has been on Ozempic for greater than 9 months therefore unlikely to be related to this.  Plan diet, electrolyte repletion.  Okay to use Imodium as no fever or bloody stools.  Return to ER with abdominal pain, inability to tolerate liquids, decreased urine output, fevers, chills.  Referral placed to gastroenterology for workup of functional diarrhea if persistent.  I personally reviewed prior external notes and test results pertinent to today's visit. Supportive care, natural history, differential diagnoses, and indications for immediate follow-up discussed. Return to clinic or go to ED if symptoms worsen or persist.  Red flag symptoms discussed.  Patient/Parent/Guardian voices understanding. Follow-up with your primary care provider in 3-5 days.  All side effects of medication discussed including allergic response, GI upset, tendon injury, rash, sedation etc    Please note that this dictation was created using voice recognition software. I have made a reasonable attempt to correct obvious errors, but I expect that there are errors of grammar and possibly content that I did not discover before finalizing the note.    This note was electronically signed by Johnna Light PA-C

## 2024-06-23 LAB
E COLI SXT1+2 STL IA: NORMAL
SIGNIFICANT IND 70042: NORMAL
SITE SITE: NORMAL
SOURCE SOURCE: NORMAL

## 2024-06-24 ENCOUNTER — HOSPITAL ENCOUNTER (OUTPATIENT)
Facility: MEDICAL CENTER | Age: 64
End: 2024-06-24
Attending: PHYSICIAN ASSISTANT
Payer: COMMERCIAL

## 2024-06-24 DIAGNOSIS — R19.7 DIARRHEA, UNSPECIFIED TYPE: ICD-10-CM

## 2024-06-24 LAB
C DIFF DNA SPEC QL NAA+PROBE: NEGATIVE
C DIFF TOX GENS STL QL NAA+PROBE: NEGATIVE

## 2024-06-24 PROCEDURE — 87493 C DIFF AMPLIFIED PROBE: CPT

## 2024-06-25 LAB
BACTERIA STL CULT: NORMAL
E COLI SXT1+2 STL IA: NORMAL
SIGNIFICANT IND 70042: NORMAL
SITE SITE: NORMAL
SOURCE SOURCE: NORMAL

## 2024-06-26 LAB — OVA AND PARASITE, FECAL INTERPRETATION Q0595: NEGATIVE

## 2024-09-23 SDOH — ECONOMIC STABILITY: FOOD INSECURITY: WITHIN THE PAST 12 MONTHS, THE FOOD YOU BOUGHT JUST DIDN'T LAST AND YOU DIDN'T HAVE MONEY TO GET MORE.: NEVER TRUE

## 2024-09-23 SDOH — HEALTH STABILITY: PHYSICAL HEALTH: ON AVERAGE, HOW MANY MINUTES DO YOU ENGAGE IN EXERCISE AT THIS LEVEL?: 30 MIN

## 2024-09-23 SDOH — ECONOMIC STABILITY: TRANSPORTATION INSECURITY
IN THE PAST 12 MONTHS, HAS THE LACK OF TRANSPORTATION KEPT YOU FROM MEDICAL APPOINTMENTS OR FROM GETTING MEDICATIONS?: NO

## 2024-09-23 SDOH — ECONOMIC STABILITY: TRANSPORTATION INSECURITY
IN THE PAST 12 MONTHS, HAS LACK OF TRANSPORTATION KEPT YOU FROM MEETINGS, WORK, OR FROM GETTING THINGS NEEDED FOR DAILY LIVING?: NO

## 2024-09-23 SDOH — ECONOMIC STABILITY: FOOD INSECURITY: WITHIN THE PAST 12 MONTHS, YOU WORRIED THAT YOUR FOOD WOULD RUN OUT BEFORE YOU GOT MONEY TO BUY MORE.: NEVER TRUE

## 2024-09-23 SDOH — HEALTH STABILITY: PHYSICAL HEALTH: ON AVERAGE, HOW MANY DAYS PER WEEK DO YOU ENGAGE IN MODERATE TO STRENUOUS EXERCISE (LIKE A BRISK WALK)?: 5 DAYS

## 2024-09-23 SDOH — ECONOMIC STABILITY: HOUSING INSECURITY
IN THE LAST 12 MONTHS, WAS THERE A TIME WHEN YOU DID NOT HAVE A STEADY PLACE TO SLEEP OR SLEPT IN A SHELTER (INCLUDING NOW)?: PATIENT DECLINED

## 2024-09-23 SDOH — ECONOMIC STABILITY: TRANSPORTATION INSECURITY
IN THE PAST 12 MONTHS, HAS LACK OF RELIABLE TRANSPORTATION KEPT YOU FROM MEDICAL APPOINTMENTS, MEETINGS, WORK OR FROM GETTING THINGS NEEDED FOR DAILY LIVING?: NO

## 2024-09-23 SDOH — ECONOMIC STABILITY: INCOME INSECURITY: IN THE LAST 12 MONTHS, WAS THERE A TIME WHEN YOU WERE NOT ABLE TO PAY THE MORTGAGE OR RENT ON TIME?: PATIENT DECLINED

## 2024-09-23 SDOH — ECONOMIC STABILITY: INCOME INSECURITY: HOW HARD IS IT FOR YOU TO PAY FOR THE VERY BASICS LIKE FOOD, HOUSING, MEDICAL CARE, AND HEATING?: PATIENT DECLINED

## 2024-09-23 ASSESSMENT — SOCIAL DETERMINANTS OF HEALTH (SDOH)
HOW OFTEN DO YOU ATTEND CHURCH OR RELIGIOUS SERVICES?: NEVER
IN A TYPICAL WEEK, HOW MANY TIMES DO YOU TALK ON THE PHONE WITH FAMILY, FRIENDS, OR NEIGHBORS?: ONCE A WEEK
WITHIN THE PAST 12 MONTHS, YOU WORRIED THAT YOUR FOOD WOULD RUN OUT BEFORE YOU GOT THE MONEY TO BUY MORE: NEVER TRUE
IN THE PAST 12 MONTHS, HAS THE ELECTRIC, GAS, OIL, OR WATER COMPANY THREATENED TO SHUT OFF SERVICE IN YOUR HOME?: PATIENT DECLINED
HOW OFTEN DO YOU GET TOGETHER WITH FRIENDS OR RELATIVES?: ONCE A WEEK
HOW HARD IS IT FOR YOU TO PAY FOR THE VERY BASICS LIKE FOOD, HOUSING, MEDICAL CARE, AND HEATING?: PATIENT DECLINED
HOW OFTEN DO YOU ATTENT MEETINGS OF THE CLUB OR ORGANIZATION YOU BELONG TO?: NEVER
DO YOU BELONG TO ANY CLUBS OR ORGANIZATIONS SUCH AS CHURCH GROUPS UNIONS, FRATERNAL OR ATHLETIC GROUPS, OR SCHOOL GROUPS?: NO
HOW OFTEN DO YOU HAVE A DRINK CONTAINING ALCOHOL: 2-4 TIMES A MONTH
IN A TYPICAL WEEK, HOW MANY TIMES DO YOU TALK ON THE PHONE WITH FAMILY, FRIENDS, OR NEIGHBORS?: ONCE A WEEK
HOW MANY DRINKS CONTAINING ALCOHOL DO YOU HAVE ON A TYPICAL DAY WHEN YOU ARE DRINKING: 1 OR 2
HOW OFTEN DO YOU GET TOGETHER WITH FRIENDS OR RELATIVES?: ONCE A WEEK
HOW OFTEN DO YOU HAVE SIX OR MORE DRINKS ON ONE OCCASION: NEVER
HOW OFTEN DO YOU ATTEND CHURCH OR RELIGIOUS SERVICES?: NEVER
HOW OFTEN DO YOU ATTENT MEETINGS OF THE CLUB OR ORGANIZATION YOU BELONG TO?: NEVER
DO YOU BELONG TO ANY CLUBS OR ORGANIZATIONS SUCH AS CHURCH GROUPS UNIONS, FRATERNAL OR ATHLETIC GROUPS, OR SCHOOL GROUPS?: NO

## 2024-09-23 ASSESSMENT — LIFESTYLE VARIABLES
AUDIT-C TOTAL SCORE: 2
HOW MANY STANDARD DRINKS CONTAINING ALCOHOL DO YOU HAVE ON A TYPICAL DAY: 1 OR 2
HOW OFTEN DO YOU HAVE SIX OR MORE DRINKS ON ONE OCCASION: NEVER
SKIP TO QUESTIONS 9-10: 1
HOW OFTEN DO YOU HAVE A DRINK CONTAINING ALCOHOL: 2-4 TIMES A MONTH

## 2024-09-26 ENCOUNTER — OFFICE VISIT (OUTPATIENT)
Dept: MEDICAL GROUP | Facility: IMAGING CENTER | Age: 64
End: 2024-09-26
Payer: COMMERCIAL

## 2024-09-26 VITALS
TEMPERATURE: 98.2 F | DIASTOLIC BLOOD PRESSURE: 60 MMHG | WEIGHT: 174.6 LBS | OXYGEN SATURATION: 94 % | RESPIRATION RATE: 16 BRPM | HEIGHT: 63 IN | HEART RATE: 52 BPM | BODY MASS INDEX: 30.94 KG/M2 | SYSTOLIC BLOOD PRESSURE: 112 MMHG

## 2024-09-26 DIAGNOSIS — H57.12 DISCOMFORT OF LEFT EYE: ICD-10-CM

## 2024-09-26 DIAGNOSIS — E11.29 TYPE 2 DIABETES MELLITUS WITH DIABETIC MICROALBUMINURIA, WITHOUT LONG-TERM CURRENT USE OF INSULIN (HCC): ICD-10-CM

## 2024-09-26 DIAGNOSIS — R80.9 TYPE 2 DIABETES MELLITUS WITH DIABETIC MICROALBUMINURIA, WITHOUT LONG-TERM CURRENT USE OF INSULIN (HCC): ICD-10-CM

## 2024-09-26 DIAGNOSIS — Z11.4 SCREENING FOR HIV (HUMAN IMMUNODEFICIENCY VIRUS): ICD-10-CM

## 2024-09-26 DIAGNOSIS — Z78.0 MENOPAUSE: ICD-10-CM

## 2024-09-26 DIAGNOSIS — I10 PRIMARY HYPERTENSION: ICD-10-CM

## 2024-09-26 DIAGNOSIS — E78.5 HYPERLIPIDEMIA, UNSPECIFIED HYPERLIPIDEMIA TYPE: ICD-10-CM

## 2024-09-26 DIAGNOSIS — E55.9 VITAMIN D INSUFFICIENCY: ICD-10-CM

## 2024-09-26 DIAGNOSIS — Z12.31 ENCOUNTER FOR SCREENING MAMMOGRAM FOR BREAST CANCER: ICD-10-CM

## 2024-09-26 PROCEDURE — 3078F DIAST BP <80 MM HG: CPT | Performed by: INTERNAL MEDICINE

## 2024-09-26 PROCEDURE — 3074F SYST BP LT 130 MM HG: CPT | Performed by: INTERNAL MEDICINE

## 2024-09-26 PROCEDURE — 99215 OFFICE O/P EST HI 40 MIN: CPT | Performed by: INTERNAL MEDICINE

## 2024-09-26 RX ORDER — LIOTHYRONINE SODIUM 5 UG/1
5 TABLET ORAL DAILY
COMMUNITY
Start: 2024-09-05

## 2024-09-26 RX ORDER — LEVOTHYROXINE SODIUM 50 UG/1
50 TABLET ORAL
COMMUNITY
Start: 2024-09-05

## 2024-09-26 RX ORDER — OLMESARTAN MEDOXOMIL 20 MG/1
20 TABLET ORAL DAILY
COMMUNITY
Start: 2024-08-05

## 2024-09-26 RX ORDER — POLYMYXIN B SULFATE AND TRIMETHOPRIM 1; 10000 MG/ML; [USP'U]/ML
1 SOLUTION OPHTHALMIC EVERY 6 HOURS
Qty: 10 ML | Refills: 0 | Status: SHIPPED | OUTPATIENT
Start: 2024-09-26 | End: 2024-09-26

## 2024-09-26 RX ORDER — POLYMYXIN B SULFATE AND TRIMETHOPRIM 1; 10000 MG/ML; [USP'U]/ML
1 SOLUTION OPHTHALMIC EVERY 6 HOURS
Qty: 10 ML | Refills: 0 | Status: SHIPPED | OUTPATIENT
Start: 2024-09-26 | End: 2024-10-01

## 2024-09-26 ASSESSMENT — PATIENT HEALTH QUESTIONNAIRE - PHQ9: CLINICAL INTERPRETATION OF PHQ2 SCORE: 0

## 2024-09-26 NOTE — PROGRESS NOTES
New Patient    Mable Dennison is a 64 y.o. female who presents today with the following:    CC:   Chief Complaint   Patient presents with    New Patient     Establish care    Eye Problem     Pt states Left eye aching,swollen, and white goop in the mornings started yesterday        HPI:       11/30/23 A1c 6.3  25 Oh vit D 21.3    Problem   Primary Hypertension    Chronic, stable on olmesartan     Discomfort of Left Eye    Left eye discomfort with swelling and white goop in the morning  Using OTC allergy eye drop  If no improvement, add polytrim eye drop and see ophthalmology     Type 2 Diabetes Mellitus (Hcc)    This is a chronic condition. Diagnosed 6/2020. 11/30/23 A1c 6.3   She is taking medication, yet she does not remember what she takes  She will bring her meds next time.   Current medications: unknown    Last A1c:  A1c 6.3 (11/30/23) 7.1% (1/2023); 7.5% (9/2022); 6.8% (8/2021); 7.8% (4/2021)  Last Microalb/Cr ratio: 82 (9/2022)  Fasting sugars: n/a  Last diabetic foot exam:   Last retinal eye exam:   ACEi/ARB: olmesartan  Statin: nottsure  Concomitant HTN: stable  Nightly foot checks: yes       Vitamin D Insufficiency    On vitamin D replacement          Current Outpatient Medications   Medication Sig    Non Formulary Request Estradiol 4 mg/ml Cream 2 clicks in the morning and 2 clicks in the evening to inner arm  Estradiol 1 mg/ml 2 clicks (0.5 ml) intravaginal twice weekly    levothyroxine (SYNTHROID) 50 MCG Tab Take 50 mcg by mouth every morning on an empty stomach.    liothyronine (CYTOMEL) 5 MCG Tab Take 5 mcg by mouth every day.    olmesartan (BENICAR) 20 MG Tab Take 20 mg by mouth every day.    polymixin-trimethoprim (POLYTRIM) 24175-9.1 UNIT/ML-% Solution Administer 1 Drop into the left eye every 6 hours for 5 days.    clobetasol (TEMOVATE) 0.05 % Ointment Apply 1 Application topically every day. Apply externally daily for 2 weeks, then every other day for 2 weeks, then twice weekly    OZEMPIC,  "0.25 OR 0.5 MG/DOSE, 2 MG/3ML Solution Pen-injector     rosuvastatin (CRESTOR) 20 MG Tab     FARXIGA 10 MG Tab     progesterone (PROMETRIUM) 100 MG Cap     Multiple Vitamin (MULTI-VITAMIN DAILY PO) Take  by mouth.     Allergies   Allergen Reactions    Codeine Nausea    Pcn [Penicillins] Hives       Allergies, past medical history, past surgical history, medications, family history, social history reviewed and updated.    ROS Please see HPI    Physical Exam  Vitals: /60 (BP Location: Left arm, Patient Position: Sitting, BP Cuff Size: Large adult)   Pulse (!) 52   Temp 36.8 °C (98.2 °F) (Temporal)   Resp 16   Ht 1.6 m (5' 3\")   Wt 79.2 kg (174 lb 9.6 oz)   SpO2 94%   BMI 30.93 kg/m²   Physical Exam  Constitutional:       Appearance: Normal appearance.   HENT:      Head: Normocephalic and atraumatic.      Right Ear: External ear normal.      Left Ear: External ear normal.      Nose: Nose normal.      Mouth/Throat:      Pharynx: Oropharynx is clear.   Eyes:      General:         Left eye: Discharge present.       Comments: Left medial conjunctiva swelling, little white discharge of left eye   Cardiovascular:      Rate and Rhythm: Normal rate and regular rhythm.      Pulses: Normal pulses.   Pulmonary:      Effort: Pulmonary effort is normal.      Breath sounds: Normal breath sounds.   Abdominal:      General: Bowel sounds are normal.      Palpations: Abdomen is soft.      Tenderness: There is no abdominal tenderness.   Musculoskeletal:      Cervical back: Neck supple.      Right lower leg: No edema.      Left lower leg: No edema.   Skin:     General: Skin is warm and dry.   Neurological:      Mental Status: She is alert. Mental status is at baseline.   Psychiatric:         Mood and Affect: Mood normal.         Behavior: Behavior normal.         Thought Content: Thought content normal.         Judgment: Judgment normal.               Assessment and Plan    1. Discomfort of left eye  - Referral to " Ophthalmology  - polymixin-trimethoprim (POLYTRIM) 77417-4.1 UNIT/ML-% Solution; Administer 1 Drop into the left eye every 6 hours for 5 days.  Dispense: 10 mL; Refill: 0  Left eye discomfort with swelling and white goop in the morning  Using OTC allergy eye drop  If no improvement, add polytrim eye drop and see ophthalmology    2. Type 2 diabetes mellitus with diabetic microalbuminuria, without long-term current use of insulin ([facility])  - Comp Metabolic Panel; Future  - Lipid Profile; Future  - MICROALBUMIN CREAT RATIO URINE; Future  - HEMOGLOBIN A1C; Future  This is a chronic condition. Diagnosed 6/2020. 11/30/23 A1c 6.3   She is taking medication, yet she does not remember what she takes  She will bring her meds next time.   Current medications: unknown    Last A1c:  A1c 6.3 (11/30/23) 7.1% (1/2023); 7.5% (9/2022); 6.8% (8/2021); 7.8% (4/2021)  Last Microalb/Cr ratio: 82 (9/2022)  Fasting sugars: n/a  Last diabetic foot exam:   Last retinal eye exam:   ACEi/ARB: olmesartan  Statin: nottsure  Concomitant HTN: stable  Nightly foot checks: yes    3. Encounter for screening mammogram for breast cancer  - MA-SCREENING MAMMO BILAT W/TOMOSYNTHESIS W/CAD; Future    4. Hyperlipidemia, unspecified hyperlipidemia type  - Lipid Profile; Future  - TSH WITH REFLEX TO FT4; Future  Healthful lifestyle measures    5. Primary hypertension  - CBC WITH DIFFERENTIAL; Future  - Comp Metabolic Panel; Future  - URINALYSIS,CULTURE IF INDICATED; Future  Chronic, stable  Continue olmesartan    6. Screening for HIV (human immunodeficiency virus)  - HIV AG/AB COMBO ASSAY SCREENING; Future    7. Menopause  - DS-BONE DENSITY STUDY (DEXA); Future    8. Vitamin D insufficiency  On replacement    Total time spent on chart review, clinic encounter, and documentation:  48 Minutes          Follow-up:Return in about 4 weeks (around 10/24/2024), or if symptoms worsen or fail to improve.    This note was created using voice recognition software. There may  be unintended errors in spelling, grammar or content.

## 2024-09-26 NOTE — ASSESSMENT & PLAN NOTE
Left eye discomfort with swelling and white goop in the morning  Using OTC allergy eye drop  If no improvement, add polytrim eye drop and see ophthalmology

## 2024-10-11 LAB
ALBUMIN SERPL-MCNC: 4.4 G/DL (ref 3.9–4.9)
ALBUMIN/CREAT UR: 139 MG/G CREAT (ref 0–29)
ALP SERPL-CCNC: 61 IU/L (ref 44–121)
ALT SERPL-CCNC: 26 IU/L (ref 0–32)
APPEARANCE UR: CLEAR
AST SERPL-CCNC: 18 IU/L (ref 0–40)
BACTERIA #/AREA URNS HPF: ABNORMAL /[HPF]
BASOPHILS # BLD AUTO: 0.1 X10E3/UL (ref 0–0.2)
BASOPHILS NFR BLD AUTO: 1 %
BILIRUB SERPL-MCNC: 0.4 MG/DL (ref 0–1.2)
BILIRUB UR QL STRIP: NEGATIVE
BUN SERPL-MCNC: 16 MG/DL (ref 8–27)
BUN/CREAT SERPL: 24 (ref 12–28)
CALCIUM SERPL-MCNC: 10.1 MG/DL (ref 8.7–10.3)
CASTS URNS MICRO: ABNORMAL
CASTS URNS QL MICRO: ABNORMAL /LPF
CHLORIDE SERPL-SCNC: 104 MMOL/L (ref 96–106)
CHOLEST SERPL-MCNC: 192 MG/DL (ref 100–199)
CO2 SERPL-SCNC: 25 MMOL/L (ref 20–29)
COLOR UR: YELLOW
CREAT SERPL-MCNC: 0.66 MG/DL (ref 0.57–1)
CREAT UR-MCNC: 47.7 MG/DL
CRYSTALS URNS MICRO: ABNORMAL
EGFRCR SERPLBLD CKD-EPI 2021: 97 ML/MIN/1.73
EOSINOPHIL # BLD AUTO: 0.1 X10E3/UL (ref 0–0.4)
EOSINOPHIL NFR BLD AUTO: 2 %
EPI CELLS #/AREA URNS HPF: ABNORMAL /HPF (ref 0–10)
ERYTHROCYTE [DISTWIDTH] IN BLOOD BY AUTOMATED COUNT: 12.1 % (ref 11.7–15.4)
GLOBULIN SER CALC-MCNC: 2.5 G/DL (ref 1.5–4.5)
GLUCOSE SERPL-MCNC: 139 MG/DL (ref 70–99)
GLUCOSE UR QL STRIP: ABNORMAL
HBA1C MFR BLD: 6.4 % (ref 4.8–5.6)
HCT VFR BLD AUTO: 45.2 % (ref 34–46.6)
HDLC SERPL-MCNC: 48 MG/DL
HGB BLD-MCNC: 15.5 G/DL (ref 11.1–15.9)
HGB UR QL STRIP: NEGATIVE
HIV 1+2 AB+HIV1 P24 AG SERPL QL IA: NON REACTIVE
IMM GRANULOCYTES # BLD AUTO: 0 X10E3/UL (ref 0–0.1)
IMM GRANULOCYTES NFR BLD AUTO: 0 %
IMMATURE CELLS  115398: NORMAL
KETONES UR QL STRIP: NEGATIVE
LDL CALC COMMENT:: ABNORMAL
LDLC SERPL CALC-MCNC: 116 MG/DL (ref 0–99)
LEUKOCYTE ESTERASE UR QL STRIP: NEGATIVE
LYMPHOCYTES # BLD AUTO: 1.7 X10E3/UL (ref 0.7–3.1)
LYMPHOCYTES NFR BLD AUTO: 31 %
MCH RBC QN AUTO: 32.9 PG (ref 26.6–33)
MCHC RBC AUTO-ENTMCNC: 34.3 G/DL (ref 31.5–35.7)
MCV RBC AUTO: 96 FL (ref 79–97)
MICRO URNS: ABNORMAL
MICRO URNS: ABNORMAL
MICROALBUMIN UR-MCNC: 66.5 UG/ML
MONOCYTES # BLD AUTO: 0.4 X10E3/UL (ref 0.1–0.9)
MONOCYTES NFR BLD AUTO: 8 %
MORPHOLOGY BLD-IMP: NORMAL
MUCOUS THREADS URNS QL MICRO: ABNORMAL
NEUTROPHILS # BLD AUTO: 3.1 X10E3/UL (ref 1.4–7)
NEUTROPHILS NFR BLD AUTO: 58 %
NITRITE UR QL STRIP: NEGATIVE
NRBC BLD AUTO-RTO: NORMAL %
PH UR STRIP: 6 [PH] (ref 5–7.5)
PLATELET # BLD AUTO: 263 X10E3/UL (ref 150–450)
POTASSIUM SERPL-SCNC: 4.4 MMOL/L (ref 3.5–5.2)
PROT SERPL-MCNC: 6.9 G/DL (ref 6–8.5)
PROT UR QL STRIP: ABNORMAL
RBC # BLD AUTO: 4.71 X10E6/UL (ref 3.77–5.28)
RBC #/AREA URNS HPF: ABNORMAL /HPF (ref 0–2)
RENAL EPI CELLS #/AREA URNS HPF: ABNORMAL /[HPF]
SODIUM SERPL-SCNC: 144 MMOL/L (ref 134–144)
SP GR UR STRIP: >=1.03 (ref 1–1.03)
T VAGINALIS URNS QL MICRO: ABNORMAL
T4 FREE SERPL-MCNC: 1.84 NG/DL (ref 0.82–1.77)
TRIGL SERPL-MCNC: 157 MG/DL (ref 0–149)
TSH SERPL DL<=0.005 MIU/L-ACNC: 0.01 UIU/ML (ref 0.45–4.5)
UNIDENT CRYS URNS QL MICRO: ABNORMAL
URINALYSIS REFLEX 377201: ABNORMAL
URNS CMNT MICRO: ABNORMAL
UROBILINOGEN UR STRIP-MCNC: 0.2 MG/DL (ref 0.2–1)
VLDLC SERPL CALC-MCNC: 28 MG/DL (ref 5–40)
WBC # BLD AUTO: 5.3 X10E3/UL (ref 3.4–10.8)
WBC #/AREA URNS HPF: ABNORMAL /HPF (ref 0–5)
YEAST #/AREA URNS HPF: PRESENT /[HPF]

## 2024-10-31 ENCOUNTER — OFFICE VISIT (OUTPATIENT)
Dept: MEDICAL GROUP | Facility: IMAGING CENTER | Age: 64
End: 2024-10-31
Payer: COMMERCIAL

## 2024-10-31 VITALS
RESPIRATION RATE: 16 BRPM | OXYGEN SATURATION: 94 % | BODY MASS INDEX: 31.11 KG/M2 | SYSTOLIC BLOOD PRESSURE: 128 MMHG | HEIGHT: 63 IN | HEART RATE: 56 BPM | TEMPERATURE: 99.3 F | DIASTOLIC BLOOD PRESSURE: 60 MMHG | WEIGHT: 175.6 LBS

## 2024-10-31 DIAGNOSIS — R94.6 ABNORMAL THYROID FUNCTION TEST: ICD-10-CM

## 2024-10-31 DIAGNOSIS — E11.29 TYPE 2 DIABETES MELLITUS WITH DIABETIC MICROALBUMINURIA, WITHOUT LONG-TERM CURRENT USE OF INSULIN (HCC): ICD-10-CM

## 2024-10-31 DIAGNOSIS — E55.9 VITAMIN D DEFICIENCY: ICD-10-CM

## 2024-10-31 DIAGNOSIS — Z23 NEED FOR VACCINATION: ICD-10-CM

## 2024-10-31 DIAGNOSIS — R80.9 TYPE 2 DIABETES MELLITUS WITH DIABETIC MICROALBUMINURIA, WITHOUT LONG-TERM CURRENT USE OF INSULIN (HCC): ICD-10-CM

## 2024-10-31 DIAGNOSIS — E78.5 HYPERLIPIDEMIA, UNSPECIFIED HYPERLIPIDEMIA TYPE: ICD-10-CM

## 2024-10-31 RX ORDER — PRAVASTATIN SODIUM 10 MG
10 TABLET ORAL NIGHTLY
Qty: 30 TABLET | Refills: 11 | Status: SHIPPED | OUTPATIENT
Start: 2024-10-31 | End: 2024-10-31

## 2024-10-31 RX ORDER — PRAVASTATIN SODIUM 10 MG
10 TABLET ORAL NIGHTLY
Qty: 30 TABLET | Refills: 11 | Status: SHIPPED | OUTPATIENT
Start: 2024-10-31

## 2024-10-31 ASSESSMENT — FIBROSIS 4 INDEX: FIB4 SCORE: 0.87

## 2024-12-19 ENCOUNTER — APPOINTMENT (OUTPATIENT)
Dept: MEDICAL GROUP | Facility: IMAGING CENTER | Age: 64
End: 2024-12-19
Payer: COMMERCIAL

## 2024-12-24 ENCOUNTER — OFFICE VISIT (OUTPATIENT)
Dept: URGENT CARE | Facility: CLINIC | Age: 64
End: 2024-12-24
Payer: COMMERCIAL

## 2024-12-24 VITALS
OXYGEN SATURATION: 98 % | HEIGHT: 63 IN | DIASTOLIC BLOOD PRESSURE: 88 MMHG | BODY MASS INDEX: 31.18 KG/M2 | RESPIRATION RATE: 20 BRPM | SYSTOLIC BLOOD PRESSURE: 140 MMHG | TEMPERATURE: 97.5 F | WEIGHT: 176 LBS | HEART RATE: 90 BPM

## 2024-12-24 DIAGNOSIS — J02.9 PHARYNGITIS, UNSPECIFIED ETIOLOGY: ICD-10-CM

## 2024-12-24 DIAGNOSIS — J06.9 VIRAL URI WITH COUGH: ICD-10-CM

## 2024-12-24 LAB
FLUAV RNA SPEC QL NAA+PROBE: NEGATIVE
FLUBV RNA SPEC QL NAA+PROBE: NEGATIVE
RSV RNA SPEC QL NAA+PROBE: NEGATIVE
S PYO DNA SPEC NAA+PROBE: NOT DETECTED
SARS-COV-2 RNA RESP QL NAA+PROBE: NEGATIVE

## 2024-12-24 PROCEDURE — 99213 OFFICE O/P EST LOW 20 MIN: CPT | Performed by: PHYSICIAN ASSISTANT

## 2024-12-24 PROCEDURE — 3079F DIAST BP 80-89 MM HG: CPT | Performed by: PHYSICIAN ASSISTANT

## 2024-12-24 PROCEDURE — 0241U POCT CEPHEID COV-2, FLU A/B, RSV - PCR: CPT | Performed by: PHYSICIAN ASSISTANT

## 2024-12-24 PROCEDURE — 87651 STREP A DNA AMP PROBE: CPT | Performed by: PHYSICIAN ASSISTANT

## 2024-12-24 PROCEDURE — 3077F SYST BP >= 140 MM HG: CPT | Performed by: PHYSICIAN ASSISTANT

## 2024-12-24 ASSESSMENT — FIBROSIS 4 INDEX: FIB4 SCORE: 0.87

## 2024-12-24 ASSESSMENT — ENCOUNTER SYMPTOMS
COUGH: 1
SHORTNESS OF BREATH: 0
DIARRHEA: 0
HEADACHES: 1
NAUSEA: 1
VOMITING: 0
CHILLS: 1
FEVER: 0
ABDOMINAL PAIN: 0
SPUTUM PRODUCTION: 0
WHEEZING: 0
SORE THROAT: 1

## 2024-12-24 NOTE — PROGRESS NOTES
"Subjective:   Mable Dennison  is a 64 y.o. female who presents for Pharyngitis (X 1am Sore throat, thick neck, nausea, chills, HA)      Pharyngitis   This is a new problem. The current episode started today. Associated symptoms include congestion, coughing (mild) and headaches. Pertinent negatives include no abdominal pain, diarrhea, ear pain, shortness of breath or vomiting.   Patient presents urgent care noting onset of mild symptoms of illness this morning.  She awoke with sensation of fullness \"thickness\" to neck.  Notes congestion and discomfort to left ear and left sinuses.  Mild sore throat on left side.  Has had some but minimal coughing.  Denies fever but has had some chills.  Patient notes some very mild nausea without vomiting.  Denies abdominal pain or diarrhea.  Denies history of asthma or pneumonia.  Patient has had COVID in the past.  Has tried treatment with OTC antipyretics.  Has holiday plans to be around an elderly individual in a pregnant individual and wants to be swabbed for what we can in urgent care.    Review of Systems   Constitutional:  Positive for chills. Negative for fever.   HENT:  Positive for congestion and sore throat. Negative for ear pain.    Respiratory:  Positive for cough (mild). Negative for sputum production, shortness of breath and wheezing.    Gastrointestinal:  Positive for nausea (mild). Negative for abdominal pain, diarrhea and vomiting.   Skin:  Negative for rash.   Neurological:  Positive for headaches.       Allergies   Allergen Reactions    Codeine Nausea    Pcn [Penicillins] Hives        Objective:   BP (!) 140/88   Pulse 90   Temp 36.4 °C (97.5 °F) (Temporal)   Resp 20   Ht 1.6 m (5' 3\")   Wt 79.8 kg (176 lb)   SpO2 98%   BMI 31.18 kg/m²     Physical Exam  Vitals and nursing note reviewed.   Constitutional:       General: She is not in acute distress.     Appearance: She is well-developed. She is not diaphoretic.   HENT:      Head: Normocephalic and " atraumatic.      Right Ear: Tympanic membrane, ear canal and external ear normal.      Left Ear: Tympanic membrane, ear canal and external ear normal.      Nose: Nose normal.      Mouth/Throat:      Mouth: Mucous membranes are moist.      Pharynx: Uvula midline. Posterior oropharyngeal erythema ( mild PND) present. No oropharyngeal exudate.      Tonsils: No tonsillar abscesses.   Eyes:      General: Lids are normal. No scleral icterus.        Right eye: No discharge.         Left eye: No discharge.      Conjunctiva/sclera: Conjunctivae normal.   Pulmonary:      Effort: Pulmonary effort is normal. No respiratory distress.      Breath sounds: Normal breath sounds. No stridor. No decreased breath sounds, wheezing, rhonchi or rales.   Musculoskeletal:         General: Normal range of motion.      Cervical back: Neck supple.   Skin:     General: Skin is warm and dry.      Coloration: Skin is not pale.      Findings: No erythema.   Neurological:      Mental Status: She is alert and oriented to person, place, and time. She is not disoriented.   Psychiatric:         Speech: Speech normal.         Behavior: Behavior normal.     Point-of-care test for COVID RSV and influenza is all negative  Point-of-care test for strep is negative    Assessment/Plan:   1. Pharyngitis, unspecified etiology  - POCT CEPHEID GROUP A STREP - PCR    2. Viral URI with cough  - POCT CoV-2, Flu A/B, RSV by PCR  Supportive care is reviewed with patient/caregiver - recommend to push PO fluids and electrolytes, over-the-counter supportive care measures reviewed, antipyretics, cough suppressant  Return to clinic with lack of resolution or progression of symptoms.      I have worn an N95 mask, gloves and eye protection for the entire encounter with this patient.     Differential diagnosis, natural history, supportive care, and indications for immediate follow-up discussed.

## 2025-01-31 LAB
25(OH)D3+25(OH)D2 SERPL-MCNC: 28.9 NG/ML (ref 30–100)
T3 SERPL-MCNC: 144 NG/DL (ref 71–180)
T4 FREE SERPL-MCNC: 1.38 NG/DL (ref 0.82–1.77)
THYROPEROXIDASE AB SERPL-ACNC: 12 IU/ML (ref 0–34)
TSH RECEP AB SER-ACNC: <1.1 IU/L (ref 0–1.75)
TSH SERPL DL<=0.005 MIU/L-ACNC: 1.1 UIU/ML (ref 0.45–4.5)
TSI SER-ACNC: <0.1 IU/L (ref 0–0.55)

## 2025-02-06 ENCOUNTER — APPOINTMENT (OUTPATIENT)
Dept: MEDICAL GROUP | Facility: IMAGING CENTER | Age: 65
End: 2025-02-06
Payer: COMMERCIAL

## 2025-04-10 ENCOUNTER — APPOINTMENT (OUTPATIENT)
Dept: MEDICAL GROUP | Facility: IMAGING CENTER | Age: 65
End: 2025-04-10
Payer: COMMERCIAL

## 2025-04-10 VITALS
DIASTOLIC BLOOD PRESSURE: 62 MMHG | OXYGEN SATURATION: 97 % | HEIGHT: 63 IN | BODY MASS INDEX: 30.9 KG/M2 | HEART RATE: 52 BPM | RESPIRATION RATE: 16 BRPM | SYSTOLIC BLOOD PRESSURE: 138 MMHG | WEIGHT: 174.4 LBS | TEMPERATURE: 97.7 F

## 2025-04-10 DIAGNOSIS — R80.9 TYPE 2 DIABETES MELLITUS WITH DIABETIC MICROALBUMINURIA, WITHOUT LONG-TERM CURRENT USE OF INSULIN (HCC): ICD-10-CM

## 2025-04-10 DIAGNOSIS — E78.5 HYPERLIPIDEMIA, UNSPECIFIED HYPERLIPIDEMIA TYPE: ICD-10-CM

## 2025-04-10 DIAGNOSIS — E11.29 TYPE 2 DIABETES MELLITUS WITH DIABETIC MICROALBUMINURIA, WITHOUT LONG-TERM CURRENT USE OF INSULIN (HCC): ICD-10-CM

## 2025-04-10 DIAGNOSIS — M79.604 RIGHT LEG PAIN: ICD-10-CM

## 2025-04-10 DIAGNOSIS — I10 PRIMARY HYPERTENSION: ICD-10-CM

## 2025-04-10 PROCEDURE — 3075F SYST BP GE 130 - 139MM HG: CPT | Performed by: INTERNAL MEDICINE

## 2025-04-10 PROCEDURE — 3078F DIAST BP <80 MM HG: CPT | Performed by: INTERNAL MEDICINE

## 2025-04-10 PROCEDURE — 99214 OFFICE O/P EST MOD 30 MIN: CPT | Performed by: INTERNAL MEDICINE

## 2025-04-10 RX ORDER — PRAVASTATIN SODIUM 10 MG
10 TABLET ORAL NIGHTLY
Qty: 90 TABLET | Refills: 3 | Status: SHIPPED | OUTPATIENT
Start: 2025-04-10

## 2025-04-10 RX ORDER — DAPAGLIFLOZIN 10 MG/1
10 TABLET, FILM COATED ORAL DAILY
Qty: 90 TABLET | Refills: 3 | Status: SHIPPED | OUTPATIENT
Start: 2025-04-10 | End: 2025-07-09

## 2025-04-10 RX ORDER — OLMESARTAN MEDOXOMIL 40 MG/1
40 TABLET ORAL DAILY
Qty: 90 TABLET | Refills: 3 | Status: SHIPPED | OUTPATIENT
Start: 2025-04-10

## 2025-04-10 ASSESSMENT — FIBROSIS 4 INDEX: FIB4 SCORE: 0.87

## 2025-04-10 ASSESSMENT — PATIENT HEALTH QUESTIONNAIRE - PHQ9: CLINICAL INTERPRETATION OF PHQ2 SCORE: 0

## 2025-04-10 NOTE — PROGRESS NOTES
Established Patient    Mable Jarrell is a 64 y.o. female who presents today with the following:    CC:   Chief Complaint   Patient presents with    Follow-Up     1/27 labs    Other     Pt report stepping wrong x2wks ago, right leg in the calf area, pt reports she believes it feels swollen but doesn't look swollen       HPI:     Verbal consent was acquired by the patient to use [facility] ambient listening note generation during this visit Yes     History of Present Illness  The patient presents for evaluation of hypertension, right calf pain, and medication management.    Hypertension  She has been on olmesartan 20 mg once daily for an extended period. Her systolic blood pressure readings are around 160s sometimes. She reports significant stress due to her sister's health condition.  - Medication: Olmesartan 20 mg once daily.  - Aggravating Factors: Significant stress due to her sister's health condition.    Hormone Replacement Therapy  She is under the care of a nurse practitioner for hormone replacement therapy. She was previously prescribed thyroid medication but discontinued it in December. She reports feeling well overall and is receiving injections from her hormone doctor.  - Medication: Previously prescribed thyroid medication, discontinued in December.  - Current Treatment: Receiving injections from her hormone doctor.  - Overall Feeling: Reports feeling well.    History of Melanoma  She has a history of melanoma, diagnosed in 2021, and undergoes annual dermatological exams. She had a colonoscopy in 2022 and an eye exam in November. She maintains a healthy diet and hydration.  - Diagnosis: Melanoma in 2021.  - Follow-up: Annual dermatological exams.  - Other Exams: Colonoscopy in 2022, eye exam in November.  - Lifestyle: Maintains a healthy diet and hydration.    Right Calf Pain  She fell from a step 2 weeks ago, resulting in tightness and soreness in her right calf. The pain is tender and consistent,  with slight swelling. The pain initially improved but worsened after another incident of stepping down stairs. Morning stiffness improves with walking. She applies Biofreeze and finds walking provides relief.  - Onset: Fell from a step 2 weeks ago.  - Location: Right calf.  - Duration: Pain initially improved but worsened after another incident.  - Character: Tightness, soreness, tender, consistent pain, slight swelling.  - Alleviating Factors: Morning stiffness improves with walking, applies Biofreeze, walking provides relief.    Vaginal Skin Disorder  She uses a cream for a vaginal skin disorder and seeks a prescription refill. She plans to consult a gynecologist but has not secured an appointment. This will be her third tube of the cream.  - Treatment: Uses a cream for the disorder, seeking a prescription refill.  - Follow-up: Plans to consult a gynecologist but has not secured an appointment.  - Usage: This will be her third tube of the cream.    Medication Management  She has not started taking pravastatin for cholesterol management.    SOCIAL HISTORY  - Does not smoke    MEDICATIONS  - Olmesartan  - Farxiga  - Pravastatin        Current Outpatient Medications   Medication Sig    olmesartan (BENICAR) 40 MG Tab Take 1 Tablet by mouth every day.    FARXIGA 10 MG Tab Take 1 Tablet by mouth every day for 90 days.    pravastatin (PRAVACHOL) 10 MG Tab Take 1 Tablet by mouth every evening.    Non Formulary Request Estradiol 4 mg/ml Cream 2 clicks in the morning and 2 clicks in the evening to inner arm  Estradiol 1 mg/ml 2 clicks (0.5 ml) intravaginal twice weekly    clobetasol (TEMOVATE) 0.05 % Ointment Apply 1 Application topically every day. Apply externally daily for 2 weeks, then every other day for 2 weeks, then twice weekly    OZEMPIC, 0.25 OR 0.5 MG/DOSE, 2 MG/3ML Solution Pen-injector     progesterone (PROMETRIUM) 100 MG Cap     Multiple Vitamin (MULTI-VITAMIN DAILY PO) Take  by mouth.     Allergies   Allergen  "Reactions    Codeine Nausea    Pcn [Penicillins] Hives       Allergies, past medical history, past surgical history, medications, family history, social history reviewed and updated.    ROS Please see HPI    Physical Exam  Vitals: /62 (BP Location: Left arm, Patient Position: Sitting, BP Cuff Size: Adult)   Pulse (!) 52   Temp 36.5 °C (97.7 °F) (Temporal)   Resp 16   Ht 1.6 m (5' 3\")   Wt 79.1 kg (174 lb 6.4 oz)   SpO2 97%   BMI 30.89 kg/m²   Physical Exam  Constitutional:       Appearance: Normal appearance.   HENT:      Head: Normocephalic and atraumatic.      Right Ear: External ear normal.      Left Ear: External ear normal.      Nose: Nose normal.      Mouth/Throat:      Pharynx: Oropharynx is clear.   Cardiovascular:      Rate and Rhythm: Normal rate and regular rhythm.      Pulses: Normal pulses.   Pulmonary:      Effort: Pulmonary effort is normal.      Breath sounds: Normal breath sounds.   Abdominal:      General: Bowel sounds are normal.      Palpations: Abdomen is soft.      Tenderness: There is no abdominal tenderness.   Musculoskeletal:      Cervical back: Neck supple.      Right lower leg: No edema.      Left lower leg: No edema.      Comments: Right calf tenderness, no erythema   Skin:     General: Skin is warm and dry.   Neurological:      Mental Status: She is alert. Mental status is at baseline.   Psychiatric:         Mood and Affect: Mood normal.         Behavior: Behavior normal.         Thought Content: Thought content normal.         Judgment: Judgment normal.           Assessment and Plan    Assessment & Plan      1. Type 2 diabetes mellitus with diabetic microalbuminuria, without long-term current use of insulin (HCC)  - olmesartan (BENICAR) 40 MG Tab; Take 1 Tablet by mouth every day.  Dispense: 90 Tablet; Refill: 3  - FARXIGA 10 MG Tab; Take 1 Tablet by mouth every day for 90 days.  Dispense: 90 Tablet; Refill: 3  - Comp Metabolic Panel; Future  - HEMOGLOBIN A1C; Future  - " MICROALBUMIN CREAT RATIO URINE; Future  - pravastatin (PRAVACHOL) 10 MG Tab; Take 1 Tablet by mouth every evening.  Dispense: 90 Tablet; Refill: 3   Latest Reference Range & Units 10/10/24 04:31   Glycohemoglobin 4.8 - 5.6 % 6.4 (H)   (H): Data is abnormally high  Chronic, controlled  Due for lab    2. Primary hypertension  - olmesartan (BENICAR) 40 MG Tab; Take 1 Tablet by mouth every day.  Dispense: 90 Tablet; Refill: 3  - CBC WITH DIFFERENTIAL; Future  - Comp Metabolic Panel; Future  - Lipid Profile; Future  Chronic, uncontrolled  Adjust olmesartan to 40 mg once daily, taken in the morning.   Monitor home BP    3. Hyperlipidemia, unspecified hyperlipidemia type  - TSH WITH REFLEX TO FT4; Future  - pravastatin (PRAVACHOL) 10 MG Tab; Take 1 Tablet by mouth every evening.  Dispense: 90 Tablet; Refill: 3  Healthful lifestyle measures  She has not started pravastatin yet    4. Right leg pain  - US-EXTREMITY VENOUS LOWER UNILAT RIGHT; Future  - DX-TIBIA AND FIBULA RIGHT; Future   Tender.  - Tenderness and stiffness in the right calf improve with walking. Order ultrasound to rule out blood clot. Apply Tiger Balm, use hot/cold compresses, and elevate the leg. Voltaren gel may be used if necessary. Consider x-ray if pain persists. Discontinue hormone therapy and initiate anticoagulant therapy if blood clot is detected. Immediate go to ER if shortness of breath or chest pain occurs.    Vaginal skin disorder.  - Consult gynecologist before continuing the cream.      Follow-up:Return in about 3 months (around 7/10/2025), or if symptoms worsen or fail to improve.    This note was created using voice recognition software. There may be unintended errors in spelling, grammar or content.

## 2025-04-11 ENCOUNTER — HOSPITAL ENCOUNTER (OUTPATIENT)
Dept: RADIOLOGY | Facility: MEDICAL CENTER | Age: 65
End: 2025-04-11
Attending: INTERNAL MEDICINE
Payer: COMMERCIAL

## 2025-04-11 DIAGNOSIS — M79.604 RIGHT LEG PAIN: ICD-10-CM

## 2025-04-11 PROCEDURE — 93971 EXTREMITY STUDY: CPT | Mod: RT

## 2025-04-15 ENCOUNTER — RESULTS FOLLOW-UP (OUTPATIENT)
Dept: MEDICAL GROUP | Facility: IMAGING CENTER | Age: 65
End: 2025-04-15

## 2025-04-22 ENCOUNTER — RESULTS FOLLOW-UP (OUTPATIENT)
Dept: GYNECOLOGY | Facility: CLINIC | Age: 65
End: 2025-04-22

## 2025-04-22 ENCOUNTER — HOSPITAL ENCOUNTER (OUTPATIENT)
Facility: MEDICAL CENTER | Age: 65
End: 2025-04-22
Attending: NURSE PRACTITIONER
Payer: COMMERCIAL

## 2025-04-22 ENCOUNTER — GYNECOLOGY VISIT (OUTPATIENT)
Dept: OBGYN | Facility: CLINIC | Age: 65
End: 2025-04-22
Payer: COMMERCIAL

## 2025-04-22 VITALS
BODY MASS INDEX: 31.01 KG/M2 | WEIGHT: 175 LBS | HEART RATE: 81 BPM | DIASTOLIC BLOOD PRESSURE: 81 MMHG | SYSTOLIC BLOOD PRESSURE: 133 MMHG | HEIGHT: 63 IN

## 2025-04-22 DIAGNOSIS — B37.31 VAGINAL YEAST INFECTION: ICD-10-CM

## 2025-04-22 DIAGNOSIS — Z11.51 SCREENING FOR HPV (HUMAN PAPILLOMAVIRUS): ICD-10-CM

## 2025-04-22 DIAGNOSIS — R10.2 ADNEXAL PAIN: ICD-10-CM

## 2025-04-22 DIAGNOSIS — Z01.419 WELL WOMAN EXAM WITH ROUTINE GYNECOLOGICAL EXAM: ICD-10-CM

## 2025-04-22 DIAGNOSIS — Z12.4 ENCOUNTER FOR PAPANICOLAOU SMEAR FOR CERVICAL CANCER SCREENING: ICD-10-CM

## 2025-04-22 DIAGNOSIS — R10.2 PELVIC PAIN: ICD-10-CM

## 2025-04-22 DIAGNOSIS — N88.8 NABOTHIAN CYST: ICD-10-CM

## 2025-04-22 DIAGNOSIS — N89.8 VAGINAL DISCHARGE: ICD-10-CM

## 2025-04-22 DIAGNOSIS — N89.8 VAGINAL ITCHING: ICD-10-CM

## 2025-04-22 DIAGNOSIS — N95.8 GENITOURINARY SYNDROME OF MENOPAUSE: ICD-10-CM

## 2025-04-22 DIAGNOSIS — N94.89 VAGINAL BURNING: ICD-10-CM

## 2025-04-22 LAB
CANDIDA DNA VAG QL PROBE+SIG AMP: POSITIVE
G VAGINALIS DNA VAG QL PROBE+SIG AMP: NEGATIVE
T VAGINALIS DNA VAG QL PROBE+SIG AMP: NEGATIVE

## 2025-04-22 PROCEDURE — 87480 CANDIDA DNA DIR PROBE: CPT

## 2025-04-22 PROCEDURE — 88142 CYTOPATH C/V THIN LAYER: CPT

## 2025-04-22 PROCEDURE — 87491 CHLMYD TRACH DNA AMP PROBE: CPT

## 2025-04-22 PROCEDURE — 87624 HPV HI-RISK TYP POOLED RSLT: CPT

## 2025-04-22 PROCEDURE — 87591 N.GONORRHOEAE DNA AMP PROB: CPT

## 2025-04-22 PROCEDURE — 87660 TRICHOMONAS VAGIN DIR PROBE: CPT

## 2025-04-22 PROCEDURE — 87510 GARDNER VAG DNA DIR PROBE: CPT

## 2025-04-22 RX ORDER — ESTRADIOL 0.1 MG/G
CREAM VAGINAL
Qty: 1 EACH | Refills: 6 | Status: SHIPPED | OUTPATIENT
Start: 2025-04-22 | End: 2025-04-22

## 2025-04-22 RX ORDER — ESTRADIOL 0.1 MG/G
CREAM VAGINAL
Qty: 1 EACH | Refills: 6 | Status: SHIPPED | OUTPATIENT
Start: 2025-04-22

## 2025-04-22 RX ORDER — CLOTRIMAZOLE 1 %
1 CREAM WITH APPLICATOR VAGINAL
Qty: 45 G | Refills: 0 | Status: SHIPPED | OUTPATIENT
Start: 2025-04-23 | End: 2025-04-30

## 2025-04-22 ASSESSMENT — FIBROSIS 4 INDEX: FIB4 SCORE: 0.87

## 2025-04-22 NOTE — PROGRESS NOTES
ANNUAL GYNECOLOGY VISIT    Chief Complaint  Annual Exam      HPI:  Subjective  Mable Dennison is a 64 y.o. female  No LMP recorded. Patient is postmenopausal.  Patient was last seen by Dr. Verdin on 3/22/2024.  Patient presents today for Annual Exam. Pt complains of itching and burning with intercourse, as well as white vaginal discharge.  She states that her  also has burning after intercourse.  The symptoms have been present for the last 6 months. Denies dysuria or hematuria. She was started on HRT due to hot flashes in .  She is using Estradiol 8 mg/gm topical cream bid and progesterone 100 mg qd which have significantly improved postmenopausal symptoms.  She obtains the prescription through a HRT clinic.  She denies any postmenopausal bleeding.  She has also noticed pain or cramps over the left ovary for the last 2 weeks.  The symptoms are intermittent.  She performs self breast exams and denies any breast symptoms.  She would like to hold off on ordering a mammogram at this time as her insurance will be changing.  She tries to stay active by walking.  She recently stepped off a curb wrong and pulled her calf.  She followed up with her PCP for this and had a lower extremity ultrasound due to swelling which was negative for DVT.  She otherwise denies any new health complaints.    Preventive Care   Immunization History   Administered Date(s) Administered    INFLUENZA TIV (IM) 10/22/2012, 10/28/2018    Influenza Seasonal Injectable - Historical Data 10/24/2017    Influenza Vaccine Pediatric Split - Historical Data 10/24/2017    Influenza Vaccine Quad Inj (Pf) 2016, 10/24/2017, 10/29/2019, 2020, 10/03/2022, 10/15/2022    Influenza Vaccine Quad Recombinant 2021    Influenza high-dose trivalent (PF) 10/31/2024    PFIZER BIVALENT SARS-COV-2 VACCINE (12+) 2022    PFIZER WHALEY CAP SARS-COV-2 VACCINATION (12+) 2022    PFIZER PURPLE CAP SARS-COV-2 VACCINATION (12+)  2021, 2021, 2021    Pneumococcal Conjugate Vaccine (PCV20) 2022    Pneumococcal polysaccharide vaccine (PPSV-23) 2020    Tdap Vaccine 2017    Zoster Vaccine Recombinant (RZV) (SHINGRIX) 2020, 2020     Last [facility]: 23 [facility]neg[facility]neg[facility]  Any abnormal pap smears?  no  Last Mammogram: 24 normal through [facility]  Last Colonoscopy:  - [facility], normal, due every 5, hx of polyps   Last DEXA: ordered 24 by PCP       Gynecology History  Current Sexual Activity: yes -  one partner  History of sexually transmitted diseases? no  Abnormal discharge? yes - white  Menopause: yes - at age 55  Postmenopausal bleeding: no    Menstrual History  No LMP recorded. Patient is postmenopausal.    Contraception  Current: none  Past: BCP d/c at age 24 due to pituitary hypertrophy      Cancer Risk Assessement:  Family history of:   - Breast cancer: no   - Ovarian cancer: no   - Uterine cancer: no   - Colon cancer: no    Obstetric History  OB History    Para Term  AB Living   6 1 1  5 1   SAB IAB Ectopic Molar Multiple Live Births   5     1      # Outcome Date GA Lbr Jerson/2nd Weight Sex Type Anes PTL Lv   6 Term 99    F Vag-Spont   DAKOTA   5 SAB            4 SAB            3 SAB            2 SAB            1 SAB                Past Medical History  Past Medical History:   Diagnosis Date    Allergy     Chickenpox     Hirsutism 2012    elevated testosterone    Hyperprolactinemia (HCC) 2012    minimal = 32    Melanoma (HCC)     Mumps     Pituitary abnormality (HCC)     2mm cyst or microadenoma    Tonsillitis        Past Surgical History  Past Surgical History:   Procedure Laterality Date    CHOLECYSTECTOMY      TONSILLECTOMY      TUBE & ECTOPIC PREG., REMOVAL         Social History  Social History     Tobacco Use    Smoking status: Never    Smokeless tobacco: Never   Vaping Use    Vaping status: Never Used   Substance Use Topics     Alcohol use: Yes     Alcohol/week: 1.2 oz     Types: 2 Glasses of wine per week     Comment: 2 per week    Drug use: Not Currently     Types: Marijuana, Oral        Family History  Family History   Problem Relation Age of Onset    Diabetes Mother     Diabetes Father     Heart Disease Father     No Known Problems Sister     No Known Problems Brother     No Known Problems Daughter     Cancer Neg Hx     Hypertension Neg Hx     Hyperlipidemia Neg Hx     Stroke Neg Hx     Psychiatric Illness Neg Hx     Thyroid Neg Hx     Ovarian Cancer Neg Hx     Tubal Cancer Neg Hx     Peritoneal Cancer Neg Hx     Colorectal Cancer Neg Hx     Breast Cancer Neg Hx        Home Medications  Current Outpatient Medications   Medication Sig    estradiol (ESTRACE VAGINAL) 0.1 MG/GM vaginal cream Apply 1g cream inside vagina using applicator nightly for 2 weeks, then twice per week thereafter. For refills, continue to take twice per week.    ESTRADIOL TD Place  on the skin 2 times a day.    clobetasol (TEMOVATE) 0.05 % Ointment Apply 1 Application topically every day. Apply externally daily for 2 weeks, then every other day for 2 weeks, then twice weekly    OZEMPIC, 0.25 OR 0.5 MG/DOSE, 2 MG/3ML Solution Pen-injector     progesterone (PROMETRIUM) 100 MG Cap     olmesartan (BENICAR) 40 MG Tab Take 1 Tablet by mouth every day.    FARXIGA 10 MG Tab Take 1 Tablet by mouth every day for 90 days.    pravastatin (PRAVACHOL) 10 MG Tab Take 1 Tablet by mouth every evening.    Non Formulary Request Estradiol 4 mg/ml Cream 2 clicks in the morning and 2 clicks in the evening to inner arm  Estradiol 1 mg/ml 2 clicks (0.5 ml) intravaginal twice weekly    Multiple Vitamin (MULTI-VITAMIN DAILY PO) Take  by mouth.       Allergies/Reactions  Allergies   Allergen Reactions    Codeine Nausea    Pcn [Penicillins] Hives       ROS  Review of Systems:  Gen: no fevers or chills, no significant weight loss or gain  Respiratory:  no cough or dyspnea  Cardiac:  no  "chest pain, no palpitations, no syncope  GI:  no heartburn, no abdominal pain, no nausea or vomiting  Psych: no depression or anxiety    Objective  /81 (BP Location: Left arm, Patient Position: Sitting, BP Cuff Size: Adult)   Pulse 81   Ht 5' 3\"   Wt 175 lb   BMI 31.00 kg/m²     Constitutional: The patient is well developed and well nourished.  Psychiatric: Patient is oriented to time place and person.   Skin: No rash observed.  Neck: Appears symmetric. Thyroid normal size  Respiratory: normal effort, no rales, rhonchi or wheezes bilaterally. Clear to auscultation bilaterally.   Heart auscultation: no murmur rub or gallop, RRR  Breast: Inspection reveals no asymetry or nipple discharge, no skin thickening, dimpling or erythema.  Palpation demonstrates no masses.  Abdomen: Soft, non-tender. BS x 4.   Pelvic Exam:      Vulva:  No lesions, noted white clumpy discharge on the vulva minora and majora.      Urethra - no lesions, no erythema     Vagina: moist, pink, normal ruggae     Cervix: pink with mild atrophy, smooth, no lesions, no CMT, Nabothian cysts noted at the cervical OS at 4 o'clock and inferiorly at 11 o'clock. The cysts are not bleeding and are blotchy per Dr. Schmid.      Uterus - non-tender, normal size, shape, contour, mobile, anteverted     Ovaries: non-tender, no appreciable masses   Pap Smear performed: Yes   Chaperone Present:  Christiano Grace MA  Extremities: Legs are symmetric and without tenderness. There is no edema present.    Physical Exam  Genitourinary:           Comments: Nabothian cysts at 4 and 11 o'clock           Assessment & Plan  Mable Dennison is a 64 y.o. female who presents today for Annual Gyn Exam. See HPI for additional details.     1. Well woman exam with routine gynecological exam  Health Maintenance   PAP: Completed today.   STI Screen (HIV, Syphilis, Chlamydia / Gonorrhea): Completed today  MAMMOGRAM: 12/9/24 [facility]  COLONOSCOPY: 2022 - [facility] " [facility], normal, due every 5, hx of polyps  DEXA: ordered 9/26/24 by PCP, advised patient to complete  IMMUNIZATIONS: Up to date  TOBACCO: Never smoker  DIABETES SCREEN: Defer to PCP  CHOLESTEROL SCREEN: Defer to PCP  COUNSELING: breast self exam  Encouraged adequate water intake, healthy diet, regular exercise. Educated on Pap smear screening and guidelines for age per ACOG and ASCCP. Discussed safe sex, STI prevention. Self breast exam taught.     2. Encounter for Papanicolaou smear for cervical cancer screening  - THINPREP PAP W/HPV AND CTNG; Future  3. Screening for HPV (human papillomavirus)  - THINPREP PAP W/HPV AND CTNG; Future    I will follow up with patient once results are available and guide treatment if indicated per ASCCP guidelines.    4. Nabothian cyst  Reviewed and assessed with Dr. Ring. Will continue to monitor with yearly pelvic exam to ensure cyst is not growing.  There is no indication for to complete a biopsy at this time.  Patient is however advised should she experience any amount of postmenopausal bleeding to follow-up in the office right away for further evaluation.    5. Pelvic pain  - US-PELVIC COMPLETE (TRANSABDOMINAL/TRANSVAGINAL) (COMBO); Future  6. Adnexal pain  - US-PELVIC COMPLETE (TRANSABDOMINAL/TRANSVAGINAL) (COMBO); Future    7. Vaginal discharge  - VAGINAL PATHOGENS DNA PANEL  8. Vaginal itching  - VAGINAL PATHOGENS DNA PANEL  9. Vaginal burning  - VAGINAL PATHOGENS DNA PANEL    10. Genitourinary syndrome of menopause  She has vaginal atrophy / genitorurinary syndrome of menopause. This is very common and due to low estrogen levels, which render the vaginal tissue thin, irritated, and open to colonization with gut marlene. This can lead to irritation, dryness, painful sex, urinary infections and urinary urgency and incontinence. Discussed risks, benefits, and indications for vaginal estrogen therapy.  Vaginal estrogen is considered a topical-only therapy that has negligible  absorption into the bloodstream and is not associated with increased risks for uterine or breast cancers, stroke, blood clots. The effects of vaginal estrogen can take weeks to months.  Prescription given for:     - estradiol (ESTRACE VAGINAL) 0.1 MG/GM vaginal cream; Apply 1g cream inside vagina using applicator nightly for 2 weeks, then twice per week thereafter. For refills, continue to take twice per week.  Dispense: 1 Each; Refill: 6      Other orders  - ESTRADIOL TD; Place  on the skin 2 times a day.        Return: 3 months to assess vaginal estrogen efficacy, and PRN.     Yadiel Oviedo A.P.R.N.  4/22/2025      This dictation was created using voice recognition software. The accuracy of the dictation is limited to the abilities of the software. I expect there may be some errors of grammar and possibly content.

## 2025-04-22 NOTE — PROGRESS NOTES
Pt here for annual   Pt states:  Last seen: Fish - 3/22/24  Last PAP: 02/01/23 - neg. , no hx of abnormals   Birth control: postmenopausal @ age 55yrs   Hx of STDs: no   Mammo: 12/9/24  Colonoscopy: 2022 - [facility], normal, due every 5, hx of polyps   DEXA: ordered 9/26/24

## 2025-04-23 LAB
C TRACH DNA GENITAL QL NAA+PROBE: NEGATIVE
N GONORRHOEA DNA GENITAL QL NAA+PROBE: NEGATIVE
SPECIMEN SOURCE: NORMAL

## 2025-04-23 NOTE — PROGRESS NOTES
Vaginal pathogen is positive for yeast infection. Sending Rx for clotrimazole cream qhs x 7 nights to pharmacy on file. Patient notified via [employer].     SHONDA Alves.

## 2025-04-30 LAB
HPV I/H RISK 1 DNA SPEC QL NAA+PROBE: NOT DETECTED
SPECIMEN SOURCE: NORMAL
THINPREP PAP, CYTOLOGY NL11781: NORMAL

## 2025-07-03 ENCOUNTER — APPOINTMENT (OUTPATIENT)
Dept: MEDICAL GROUP | Facility: IMAGING CENTER | Age: 65
End: 2025-07-03
Payer: COMMERCIAL

## 2025-08-12 DIAGNOSIS — E11.29 TYPE 2 DIABETES MELLITUS WITH DIABETIC MICROALBUMINURIA, WITHOUT LONG-TERM CURRENT USE OF INSULIN (HCC): ICD-10-CM

## 2025-08-12 DIAGNOSIS — R80.9 TYPE 2 DIABETES MELLITUS WITH DIABETIC MICROALBUMINURIA, WITHOUT LONG-TERM CURRENT USE OF INSULIN (HCC): ICD-10-CM

## 2025-08-12 DIAGNOSIS — E78.5 HYPERLIPIDEMIA, UNSPECIFIED HYPERLIPIDEMIA TYPE: ICD-10-CM

## 2025-08-12 DIAGNOSIS — I10 PRIMARY HYPERTENSION: ICD-10-CM

## 2025-08-12 RX ORDER — PRAVASTATIN SODIUM 10 MG
10 TABLET ORAL NIGHTLY
Qty: 90 TABLET | Refills: 0 | Status: SHIPPED | OUTPATIENT
Start: 2025-08-12

## 2025-08-12 RX ORDER — OLMESARTAN MEDOXOMIL 40 MG/1
40 TABLET ORAL DAILY
Qty: 90 TABLET | Refills: 0 | Status: SHIPPED | OUTPATIENT
Start: 2025-08-12

## 2025-09-19 ENCOUNTER — APPOINTMENT (OUTPATIENT)
Dept: MEDICAL GROUP | Facility: IMAGING CENTER | Age: 65
End: 2025-09-19